# Patient Record
Sex: FEMALE | Race: WHITE | NOT HISPANIC OR LATINO | Employment: OTHER | URBAN - METROPOLITAN AREA
[De-identification: names, ages, dates, MRNs, and addresses within clinical notes are randomized per-mention and may not be internally consistent; named-entity substitution may affect disease eponyms.]

---

## 2017-02-27 ENCOUNTER — RECORDS - HEALTHEAST (OUTPATIENT)
Dept: LAB | Facility: CLINIC | Age: 77
End: 2017-02-27

## 2017-02-27 LAB
CHOLEST SERPL-MCNC: 237 MG/DL
FASTING STATUS PATIENT QL REPORTED: ABNORMAL
HDLC SERPL-MCNC: 101 MG/DL
LDLC SERPL CALC-MCNC: 120 MG/DL
TRIGL SERPL-MCNC: 80 MG/DL

## 2017-05-24 ENCOUNTER — RECORDS - HEALTHEAST (OUTPATIENT)
Dept: LAB | Facility: CLINIC | Age: 77
End: 2017-05-24

## 2017-05-24 LAB
CHOLEST SERPL-MCNC: 276 MG/DL
FASTING STATUS PATIENT QL REPORTED: ABNORMAL
HDLC SERPL-MCNC: 88 MG/DL
LDLC SERPL CALC-MCNC: 167 MG/DL
TRIGL SERPL-MCNC: 107 MG/DL

## 2018-02-12 ENCOUNTER — RECORDS - HEALTHEAST (OUTPATIENT)
Dept: LAB | Facility: CLINIC | Age: 78
End: 2018-02-12

## 2018-02-12 LAB
ALBUMIN SERPL-MCNC: 3.4 G/DL (ref 3.5–5)
ALP SERPL-CCNC: 104 U/L (ref 45–120)
ALT SERPL W P-5'-P-CCNC: 19 U/L (ref 0–45)
ANION GAP SERPL CALCULATED.3IONS-SCNC: 8 MMOL/L (ref 5–18)
AST SERPL W P-5'-P-CCNC: 24 U/L (ref 0–40)
BILIRUB SERPL-MCNC: 0.8 MG/DL (ref 0–1)
BUN SERPL-MCNC: 15 MG/DL (ref 8–28)
CALCIUM SERPL-MCNC: 9.2 MG/DL (ref 8.5–10.5)
CHLORIDE BLD-SCNC: 107 MMOL/L (ref 98–107)
CO2 SERPL-SCNC: 28 MMOL/L (ref 22–31)
CREAT SERPL-MCNC: 0.72 MG/DL (ref 0.6–1.1)
GFR SERPL CREATININE-BSD FRML MDRD: >60 ML/MIN/1.73M2
GLUCOSE BLD-MCNC: 92 MG/DL (ref 70–125)
POTASSIUM BLD-SCNC: 4.1 MMOL/L (ref 3.5–5)
PROT SERPL-MCNC: 6.7 G/DL (ref 6–8)
SODIUM SERPL-SCNC: 143 MMOL/L (ref 136–145)

## 2018-08-06 ENCOUNTER — RECORDS - HEALTHEAST (OUTPATIENT)
Dept: LAB | Facility: CLINIC | Age: 78
End: 2018-08-06

## 2018-08-06 LAB
ALBUMIN SERPL-MCNC: 3.5 G/DL (ref 3.5–5)
ALP SERPL-CCNC: 105 U/L (ref 45–120)
ALT SERPL W P-5'-P-CCNC: 18 U/L (ref 0–45)
ANION GAP SERPL CALCULATED.3IONS-SCNC: 9 MMOL/L (ref 5–18)
AST SERPL W P-5'-P-CCNC: 24 U/L (ref 0–40)
BILIRUB SERPL-MCNC: 0.4 MG/DL (ref 0–1)
BUN SERPL-MCNC: 13 MG/DL (ref 8–28)
CALCIUM SERPL-MCNC: 9.2 MG/DL (ref 8.5–10.5)
CHLORIDE BLD-SCNC: 107 MMOL/L (ref 98–107)
CO2 SERPL-SCNC: 27 MMOL/L (ref 22–31)
CREAT SERPL-MCNC: 0.76 MG/DL (ref 0.6–1.1)
ERYTHROCYTE [DISTWIDTH] IN BLOOD BY AUTOMATED COUNT: 13.2 % (ref 11–14.5)
GFR SERPL CREATININE-BSD FRML MDRD: >60 ML/MIN/1.73M2
GLUCOSE BLD-MCNC: 84 MG/DL (ref 70–125)
HCT VFR BLD AUTO: 40.7 % (ref 35–47)
HGB BLD-MCNC: 13 G/DL (ref 12–16)
MCH RBC QN AUTO: 30.2 PG (ref 27–34)
MCHC RBC AUTO-ENTMCNC: 31.9 G/DL (ref 32–36)
MCV RBC AUTO: 94 FL (ref 80–100)
PLATELET # BLD AUTO: 227 THOU/UL (ref 140–440)
PMV BLD AUTO: 10.4 FL (ref 8.5–12.5)
POTASSIUM BLD-SCNC: 4.4 MMOL/L (ref 3.5–5)
PROT SERPL-MCNC: 6.4 G/DL (ref 6–8)
RBC # BLD AUTO: 4.31 MILL/UL (ref 3.8–5.4)
SODIUM SERPL-SCNC: 143 MMOL/L (ref 136–145)
TSH SERPL DL<=0.005 MIU/L-ACNC: 2.81 UIU/ML (ref 0.3–5)
WBC: 4.8 THOU/UL (ref 4–11)

## 2019-02-04 ENCOUNTER — RECORDS - HEALTHEAST (OUTPATIENT)
Dept: LAB | Facility: CLINIC | Age: 79
End: 2019-02-04

## 2019-02-04 LAB
ANION GAP SERPL CALCULATED.3IONS-SCNC: 11 MMOL/L (ref 5–18)
BUN SERPL-MCNC: 17 MG/DL (ref 8–28)
CALCIUM SERPL-MCNC: 9.2 MG/DL (ref 8.5–10.5)
CHLORIDE BLD-SCNC: 101 MMOL/L (ref 98–107)
CHOLEST SERPL-MCNC: 214 MG/DL
CO2 SERPL-SCNC: 27 MMOL/L (ref 22–31)
CREAT SERPL-MCNC: 0.75 MG/DL (ref 0.6–1.1)
FASTING STATUS PATIENT QL REPORTED: ABNORMAL
GFR SERPL CREATININE-BSD FRML MDRD: >60 ML/MIN/1.73M2
GLUCOSE BLD-MCNC: 79 MG/DL (ref 70–125)
HDLC SERPL-MCNC: 92 MG/DL
LDLC SERPL CALC-MCNC: 105 MG/DL
POTASSIUM BLD-SCNC: 4.2 MMOL/L (ref 3.5–5)
SODIUM SERPL-SCNC: 139 MMOL/L (ref 136–145)
TRIGL SERPL-MCNC: 86 MG/DL

## 2019-09-04 ENCOUNTER — AMBULATORY - HEALTHEAST (OUTPATIENT)
Dept: SURGERY | Facility: CLINIC | Age: 79
End: 2019-09-04

## 2020-03-27 ENCOUNTER — RECORDS - HEALTHEAST (OUTPATIENT)
Dept: LAB | Facility: CLINIC | Age: 80
End: 2020-03-27

## 2020-03-29 LAB — BACTERIA SPEC CULT: ABNORMAL

## 2020-08-14 ENCOUNTER — RECORDS - HEALTHEAST (OUTPATIENT)
Dept: LAB | Facility: CLINIC | Age: 80
End: 2020-08-14

## 2020-08-14 LAB
ANION GAP SERPL CALCULATED.3IONS-SCNC: 11 MMOL/L (ref 5–18)
BUN SERPL-MCNC: 15 MG/DL (ref 8–28)
CALCIUM SERPL-MCNC: 9.2 MG/DL (ref 8.5–10.5)
CHLORIDE BLD-SCNC: 107 MMOL/L (ref 98–107)
CO2 SERPL-SCNC: 25 MMOL/L (ref 22–31)
CREAT SERPL-MCNC: 0.84 MG/DL (ref 0.6–1.1)
GFR SERPL CREATININE-BSD FRML MDRD: >60 ML/MIN/1.73M2
GLUCOSE BLD-MCNC: 100 MG/DL (ref 70–125)
POTASSIUM BLD-SCNC: 4.6 MMOL/L (ref 3.5–5)
SODIUM SERPL-SCNC: 143 MMOL/L (ref 136–145)

## 2020-08-17 ENCOUNTER — RECORDS - HEALTHEAST (OUTPATIENT)
Dept: ADMINISTRATIVE | Facility: OTHER | Age: 80
End: 2020-08-17

## 2020-08-26 RX ORDER — LOVASTATIN 20 MG
20 TABLET ORAL AT BEDTIME
Status: SHIPPED | COMMUNITY
Start: 2020-08-26

## 2020-08-26 RX ORDER — B-COMPLEX WITH VITAMIN C
TABLET ORAL
Status: SHIPPED | COMMUNITY
Start: 2020-08-26 | End: 2023-10-24

## 2020-08-26 RX ORDER — TRAMADOL HYDROCHLORIDE 50 MG/1
50 TABLET ORAL 2 TIMES DAILY
Status: SHIPPED | COMMUNITY
Start: 2020-08-26 | End: 2023-09-19

## 2020-08-26 RX ORDER — PRAMIPEXOLE DIHYDROCHLORIDE 0.12 MG/1
0.12 TABLET ORAL AT BEDTIME
Status: SHIPPED | COMMUNITY
Start: 2020-08-26 | End: 2023-09-19

## 2020-08-26 RX ORDER — LISINOPRIL 10 MG/1
10 TABLET ORAL DAILY
Status: SHIPPED | COMMUNITY
Start: 2020-08-26 | End: 2023-09-19

## 2020-08-26 RX ORDER — LEVOTHYROXINE SODIUM 50 UG/1
50 TABLET ORAL DAILY
Status: SHIPPED | COMMUNITY
Start: 2020-08-26

## 2020-08-27 ENCOUNTER — HOSPITAL ENCOUNTER (OUTPATIENT)
Dept: NUCLEAR MEDICINE | Facility: HOSPITAL | Age: 80
Discharge: HOME | End: 2020-08-27
Attending: FAMILY MEDICINE

## 2020-08-27 ENCOUNTER — HOSPITAL ENCOUNTER (OUTPATIENT)
Dept: CARDIOLOGY | Facility: HOSPITAL | Age: 80
Discharge: HOME | End: 2020-08-27
Attending: FAMILY MEDICINE

## 2020-08-27 DIAGNOSIS — R06.09 DOE (DYSPNEA ON EXERTION): ICD-10-CM

## 2020-08-27 LAB
CV STRESS CURRENT BP HE: NORMAL
CV STRESS CURRENT HR HE: 101
CV STRESS CURRENT HR HE: 103
CV STRESS CURRENT HR HE: 103
CV STRESS CURRENT HR HE: 104
CV STRESS CURRENT HR HE: 76
CV STRESS CURRENT HR HE: 78
CV STRESS CURRENT HR HE: 88
CV STRESS CURRENT HR HE: 89
CV STRESS CURRENT HR HE: 90
CV STRESS CURRENT HR HE: 90
CV STRESS CURRENT HR HE: 92
CV STRESS CURRENT HR HE: 93
CV STRESS CURRENT HR HE: 96
CV STRESS CURRENT HR HE: 99
CV STRESS CURRENT HR HE: 99
CV STRESS DEVIATION TIME HE: NORMAL
CV STRESS ECHO PERCENT HR HE: NORMAL
CV STRESS EXERCISE STAGE HE: NORMAL
CV STRESS FINAL RESTING BP HE: NORMAL
CV STRESS FINAL RESTING HR HE: 89
CV STRESS MAX HR HE: 105
CV STRESS MAX TREADMILL GRADE HE: 0
CV STRESS MAX TREADMILL SPEED HE: 0
CV STRESS PEAK DIA BP HE: NORMAL
CV STRESS PEAK SYS BP HE: NORMAL
CV STRESS PHASE HE: NORMAL
CV STRESS PROTOCOL HE: NORMAL
CV STRESS RESTING PT POSITION HE: NORMAL
CV STRESS ST DEVIATION AMOUNT HE: NORMAL
CV STRESS ST DEVIATION ELEVATION HE: NORMAL
CV STRESS ST EVELATION AMOUNT HE: NORMAL
CV STRESS TEST TYPE HE: NORMAL
CV STRESS TOTAL STAGE TIME MIN 1 HE: NORMAL
NUC REST EJECTION FRACTION: 76 %
RATE PRESSURE PRODUCT: NORMAL
STRESS ECHO BASELINE DIASTOLIC HE: 112
STRESS ECHO BASELINE HR: 80
STRESS ECHO BASELINE SYSTOLIC BP: 217
STRESS ECHO CALCULATED PERCENT HR: 76 %
STRESS ECHO LAST STRESS DIASTOLIC BP: 110
STRESS ECHO LAST STRESS HR: 99
STRESS ECHO LAST STRESS SYSTOLIC BP: 246
STRESS ECHO TARGET HR: 139

## 2020-12-15 ENCOUNTER — RECORDS - HEALTHEAST (OUTPATIENT)
Dept: LAB | Facility: CLINIC | Age: 80
End: 2020-12-15

## 2020-12-15 LAB
ANION GAP SERPL CALCULATED.3IONS-SCNC: 7 MMOL/L (ref 5–18)
BUN SERPL-MCNC: 19 MG/DL (ref 8–28)
CALCIUM SERPL-MCNC: 8.7 MG/DL (ref 8.5–10.5)
CHLORIDE BLD-SCNC: 107 MMOL/L (ref 98–107)
CHOLEST SERPL-MCNC: 235 MG/DL
CO2 SERPL-SCNC: 31 MMOL/L (ref 22–31)
CREAT SERPL-MCNC: 0.73 MG/DL (ref 0.6–1.1)
FASTING STATUS PATIENT QL REPORTED: ABNORMAL
GFR SERPL CREATININE-BSD FRML MDRD: >60 ML/MIN/1.73M2
GLUCOSE BLD-MCNC: 97 MG/DL (ref 70–125)
HDLC SERPL-MCNC: 100 MG/DL
LDLC SERPL CALC-MCNC: 113 MG/DL
POTASSIUM BLD-SCNC: 4.6 MMOL/L (ref 3.5–5)
SODIUM SERPL-SCNC: 145 MMOL/L (ref 136–145)
TRIGL SERPL-MCNC: 108 MG/DL
TSH SERPL DL<=0.005 MIU/L-ACNC: 4.51 UIU/ML (ref 0.3–5)

## 2021-04-27 ENCOUNTER — RECORDS - HEALTHEAST (OUTPATIENT)
Dept: LAB | Facility: CLINIC | Age: 81
End: 2021-04-27

## 2021-04-27 LAB
ANION GAP SERPL CALCULATED.3IONS-SCNC: 8 MMOL/L (ref 5–18)
BNP SERPL-MCNC: 121 PG/ML (ref 0–163)
BUN SERPL-MCNC: 18 MG/DL (ref 8–28)
CALCIUM SERPL-MCNC: 8.6 MG/DL (ref 8.5–10.5)
CHLORIDE BLD-SCNC: 108 MMOL/L (ref 98–107)
CO2 SERPL-SCNC: 29 MMOL/L (ref 22–31)
CREAT SERPL-MCNC: 0.83 MG/DL (ref 0.6–1.1)
GFR SERPL CREATININE-BSD FRML MDRD: >60 ML/MIN/1.73M2
GLUCOSE BLD-MCNC: 92 MG/DL (ref 70–125)
POTASSIUM BLD-SCNC: 4.4 MMOL/L (ref 3.5–5)
SODIUM SERPL-SCNC: 145 MMOL/L (ref 136–145)

## 2021-05-25 ENCOUNTER — RECORDS - HEALTHEAST (OUTPATIENT)
Dept: LAB | Facility: CLINIC | Age: 81
End: 2021-05-25

## 2021-05-25 ENCOUNTER — RECORDS - HEALTHEAST (OUTPATIENT)
Dept: ADMINISTRATIVE | Facility: CLINIC | Age: 81
End: 2021-05-25

## 2021-05-25 LAB
ANION GAP SERPL CALCULATED.3IONS-SCNC: 10 MMOL/L (ref 5–18)
BUN SERPL-MCNC: 23 MG/DL (ref 8–28)
CALCIUM SERPL-MCNC: 8.5 MG/DL (ref 8.5–10.5)
CHLORIDE BLD-SCNC: 108 MMOL/L (ref 98–107)
CO2 SERPL-SCNC: 27 MMOL/L (ref 22–31)
CREAT SERPL-MCNC: 0.94 MG/DL (ref 0.6–1.1)
GFR SERPL CREATININE-BSD FRML MDRD: 57 ML/MIN/1.73M2
GLUCOSE BLD-MCNC: 106 MG/DL (ref 70–125)
POTASSIUM BLD-SCNC: 4.7 MMOL/L (ref 3.5–5)
SODIUM SERPL-SCNC: 145 MMOL/L (ref 136–145)

## 2021-05-29 ENCOUNTER — RECORDS - HEALTHEAST (OUTPATIENT)
Dept: ADMINISTRATIVE | Facility: CLINIC | Age: 81
End: 2021-05-29

## 2021-05-30 ENCOUNTER — RECORDS - HEALTHEAST (OUTPATIENT)
Dept: ADMINISTRATIVE | Facility: CLINIC | Age: 81
End: 2021-05-30

## 2021-05-31 ENCOUNTER — RECORDS - HEALTHEAST (OUTPATIENT)
Dept: ADMINISTRATIVE | Facility: CLINIC | Age: 81
End: 2021-05-31

## 2021-06-10 NOTE — PROGRESS NOTES
Nuclear Pharmacological Stress Test:  Patient arrived for nuclear stress test with /112. Patient having stress test for DESAI; denies CP, headache, or other symptoms. Patient states has never had htn nor does she have any rx for antihypertensives. Lexiscan stress test was completed. RN consulted cardiologist Dr. Santacruz re: patient's htn and bringing patient to ED was recommended. Patient was agreeable to this plan. BP recheck after final nuclear images was 226/102 so ED was notified and patient was brought to ED room 10 to be further examined.  Disha Velasquez, RN  [facility]

## 2021-07-07 ENCOUNTER — RECORDS - HEALTHEAST (OUTPATIENT)
Dept: RESPIRATORY THERAPY | Facility: CLINIC | Age: 81
End: 2021-07-07

## 2021-07-07 ENCOUNTER — RECORDS - HEALTHEAST (OUTPATIENT)
Dept: ADMINISTRATIVE | Facility: OTHER | Age: 81
End: 2021-07-07

## 2021-07-07 DIAGNOSIS — R06.00 DYSPNEA, UNSPECIFIED TYPE: ICD-10-CM

## 2021-08-04 ENCOUNTER — HOSPITAL ENCOUNTER (OUTPATIENT)
Dept: RESPIRATORY THERAPY | Facility: CLINIC | Age: 81
Discharge: HOME | End: 2021-08-04
Attending: INTERNAL MEDICINE | Admitting: INTERNAL MEDICINE
Payer: COMMERCIAL

## 2021-08-04 DIAGNOSIS — R06.00 DYSPNEA, UNSPECIFIED: ICD-10-CM

## 2021-08-04 LAB — HGB BLD-MCNC: 11.8 G/DL (ref 11.7–15.7)

## 2021-08-04 PROCEDURE — 94060 EVALUATION OF WHEEZING: CPT

## 2021-08-04 PROCEDURE — 94729 DIFFUSING CAPACITY: CPT | Mod: 26 | Performed by: INTERNAL MEDICINE

## 2021-08-04 PROCEDURE — 36415 COLL VENOUS BLD VENIPUNCTURE: CPT

## 2021-08-04 PROCEDURE — 94729 DIFFUSING CAPACITY: CPT

## 2021-08-04 PROCEDURE — 94726 PLETHYSMOGRAPHY LUNG VOLUMES: CPT | Mod: 26 | Performed by: INTERNAL MEDICINE

## 2021-08-04 PROCEDURE — 94726 PLETHYSMOGRAPHY LUNG VOLUMES: CPT

## 2021-08-04 PROCEDURE — 94060 EVALUATION OF WHEEZING: CPT | Mod: 26 | Performed by: INTERNAL MEDICINE

## 2021-08-04 PROCEDURE — 999N000157 HC STATISTIC RCP TIME EA 10 MIN

## 2021-08-04 PROCEDURE — 85018 HEMOGLOBIN: CPT

## 2021-08-04 PROCEDURE — 94640 AIRWAY INHALATION TREATMENT: CPT

## 2021-08-04 RX ORDER — ALBUTEROL SULFATE 0.83 MG/ML
2.5 SOLUTION RESPIRATORY (INHALATION) ONCE
Status: COMPLETED | OUTPATIENT
Start: 2021-08-04 | End: 2021-08-04

## 2021-08-04 RX ADMIN — ALBUTEROL SULFATE 2.5 MG: 0.83 SOLUTION RESPIRATORY (INHALATION) at 14:27

## 2021-08-04 NOTE — PROGRESS NOTES
RESPIRATORY CARE NOTE     Patient Name: Makayla Dow  Today's Date: 8/4/2021     Complete PFT done. Pt performed tests with good effort. Albuterol neb given.Test results meet ATS criteria. Results scanned into epic. Pt left in no distress.       Leatha Tam, RT

## 2021-09-04 LAB
DLCOCOR-%PRED-PRE: 83 %
DLCOCOR-PRE: 14.83 ML/MIN/MMHG
DLCOUNC-%PRED-PRE: 79 %
DLCOUNC-PRE: 14.05 ML/MIN/MMHG
DLCOUNC-PRED: 17.73 ML/MIN/MMHG
ERV-%PRED-PRE: -70 %
ERV-PRE: 0.08 L
ERV-PRED: -0.12 L
EXPTIME-PRE: 5.73 SEC
FEF2575-%PRED-POST: 78 %
FEF2575-%PRED-PRE: 60 %
FEF2575-POST: 1.08 L/SEC
FEF2575-PRE: 0.84 L/SEC
FEF2575-PRED: 1.38 L/SEC
FEFMAX-%PRED-PRE: 66 %
FEFMAX-PRE: 2.92 L/SEC
FEFMAX-PRED: 4.37 L/SEC
FEV1-%PRED-PRE: 61 %
FEV1-PRE: 1.02 L
FEV1FEV6-PRE: 77 %
FEV1FEV6-PRED: 77 %
FEV1FVC-PRE: 77 %
FEV1FVC-PRED: 78 %
FEV1SVC-PRE: 81 %
FEV1SVC-PRED: 64 %
FIFMAX-PRE: 2.01 L/SEC
FRCPLETH-%PRED-PRE: 88 %
FRCPLETH-PRE: 2.34 L
FRCPLETH-PRED: 2.64 L
FVC-%PRED-PRE: 60 %
FVC-PRE: 1.32 L
FVC-PRED: 2.17 L
IC-%PRED-PRE: 43 %
IC-PRE: 1.19 L
IC-PRED: 2.74 L
RVPLETH-%PRED-PRE: 103 %
RVPLETH-PRE: 2.25 L
RVPLETH-PRED: 2.19 L
TLCPLETH-%PRED-PRE: 75 %
TLCPLETH-PRE: 3.52 L
TLCPLETH-PRED: 4.69 L
VA-%PRED-PRE: 71 %
VA-PRE: 3.07 L
VC-%PRED-PRE: 48 %
VC-PRE: 1.27 L
VC-PRED: 2.62 L

## 2021-09-23 ENCOUNTER — LAB REQUISITION (OUTPATIENT)
Dept: LAB | Facility: CLINIC | Age: 81
End: 2021-09-23

## 2021-09-23 DIAGNOSIS — R60.0 LOCALIZED EDEMA: ICD-10-CM

## 2021-09-23 LAB
ANION GAP SERPL CALCULATED.3IONS-SCNC: 10 MMOL/L (ref 5–18)
BUN SERPL-MCNC: 16 MG/DL (ref 8–28)
CALCIUM SERPL-MCNC: 8.9 MG/DL (ref 8.5–10.5)
CHLORIDE BLD-SCNC: 108 MMOL/L (ref 98–107)
CO2 SERPL-SCNC: 24 MMOL/L (ref 22–31)
CREAT SERPL-MCNC: 0.81 MG/DL (ref 0.6–1.1)
GFR SERPL CREATININE-BSD FRML MDRD: 68 ML/MIN/1.73M2
GLUCOSE BLD-MCNC: 86 MG/DL (ref 70–125)
POTASSIUM BLD-SCNC: 4 MMOL/L (ref 3.5–5)
SODIUM SERPL-SCNC: 142 MMOL/L (ref 136–145)

## 2021-09-23 PROCEDURE — 80048 BASIC METABOLIC PNL TOTAL CA: CPT | Performed by: FAMILY MEDICINE

## 2021-09-23 PROCEDURE — 36415 COLL VENOUS BLD VENIPUNCTURE: CPT | Performed by: FAMILY MEDICINE

## 2022-01-13 ENCOUNTER — LAB REQUISITION (OUTPATIENT)
Dept: LAB | Facility: CLINIC | Age: 82
End: 2022-01-13
Payer: COMMERCIAL

## 2022-01-13 DIAGNOSIS — Z03.818 ENCOUNTER FOR OBSERVATION FOR SUSPECTED EXPOSURE TO OTHER BIOLOGICAL AGENTS RULED OUT: ICD-10-CM

## 2022-01-13 PROCEDURE — U0005 INFEC AGEN DETEC AMPLI PROBE: HCPCS | Mod: ORL | Performed by: FAMILY MEDICINE

## 2022-01-14 LAB — SARS-COV-2 RNA RESP QL NAA+PROBE: POSITIVE

## 2022-02-10 ENCOUNTER — LAB REQUISITION (OUTPATIENT)
Dept: LAB | Facility: CLINIC | Age: 82
End: 2022-02-10

## 2022-02-10 DIAGNOSIS — E03.9 HYPOTHYROIDISM, UNSPECIFIED: ICD-10-CM

## 2022-02-10 DIAGNOSIS — E78.5 HYPERLIPIDEMIA, UNSPECIFIED: ICD-10-CM

## 2022-02-10 DIAGNOSIS — R60.0 LOCALIZED EDEMA: ICD-10-CM

## 2022-02-10 LAB
ANION GAP SERPL CALCULATED.3IONS-SCNC: 13 MMOL/L (ref 5–18)
BNP SERPL-MCNC: 117 PG/ML (ref 0–167)
BUN SERPL-MCNC: 13 MG/DL (ref 8–28)
CALCIUM SERPL-MCNC: 8.8 MG/DL (ref 8.5–10.5)
CHLORIDE BLD-SCNC: 106 MMOL/L (ref 98–107)
CHOLEST SERPL-MCNC: 215 MG/DL
CO2 SERPL-SCNC: 24 MMOL/L (ref 22–31)
CREAT SERPL-MCNC: 0.73 MG/DL (ref 0.6–1.1)
FASTING STATUS PATIENT QL REPORTED: ABNORMAL
GFR SERPL CREATININE-BSD FRML MDRD: 81 ML/MIN/1.73M2
GLUCOSE BLD-MCNC: 104 MG/DL (ref 70–125)
HDLC SERPL-MCNC: 80 MG/DL
LDLC SERPL CALC-MCNC: 119 MG/DL
POTASSIUM BLD-SCNC: 4.4 MMOL/L (ref 3.5–5)
SODIUM SERPL-SCNC: 143 MMOL/L (ref 136–145)
TRIGL SERPL-MCNC: 78 MG/DL
TSH SERPL DL<=0.005 MIU/L-ACNC: 7.88 UIU/ML (ref 0.3–5)

## 2022-02-10 PROCEDURE — 83880 ASSAY OF NATRIURETIC PEPTIDE: CPT | Performed by: FAMILY MEDICINE

## 2022-02-10 PROCEDURE — 80048 BASIC METABOLIC PNL TOTAL CA: CPT | Performed by: FAMILY MEDICINE

## 2022-02-10 PROCEDURE — 84443 ASSAY THYROID STIM HORMONE: CPT | Performed by: FAMILY MEDICINE

## 2022-02-10 PROCEDURE — 80061 LIPID PANEL: CPT | Performed by: FAMILY MEDICINE

## 2022-03-18 ENCOUNTER — LAB REQUISITION (OUTPATIENT)
Dept: LAB | Facility: CLINIC | Age: 82
End: 2022-03-18
Payer: COMMERCIAL

## 2022-03-18 DIAGNOSIS — Z03.818 ENCOUNTER FOR OBSERVATION FOR SUSPECTED EXPOSURE TO OTHER BIOLOGICAL AGENTS RULED OUT: ICD-10-CM

## 2022-03-18 PROCEDURE — U0005 INFEC AGEN DETEC AMPLI PROBE: HCPCS | Mod: ORL | Performed by: FAMILY MEDICINE

## 2022-03-19 LAB — SARS-COV-2 RNA RESP QL NAA+PROBE: NEGATIVE

## 2022-11-08 ENCOUNTER — TRANSFERRED RECORDS (OUTPATIENT)
Dept: HEALTH INFORMATION MANAGEMENT | Facility: CLINIC | Age: 82
End: 2022-11-08

## 2023-04-07 ENCOUNTER — LAB REQUISITION (OUTPATIENT)
Dept: LAB | Facility: CLINIC | Age: 83
End: 2023-04-07

## 2023-04-07 ENCOUNTER — TRANSFERRED RECORDS (OUTPATIENT)
Dept: HEALTH INFORMATION MANAGEMENT | Facility: CLINIC | Age: 83
End: 2023-04-07

## 2023-04-07 DIAGNOSIS — E78.5 HYPERLIPIDEMIA, UNSPECIFIED: ICD-10-CM

## 2023-04-07 DIAGNOSIS — E03.9 HYPOTHYROIDISM, UNSPECIFIED: ICD-10-CM

## 2023-04-07 DIAGNOSIS — I10 ESSENTIAL (PRIMARY) HYPERTENSION: ICD-10-CM

## 2023-04-07 LAB
ANION GAP SERPL CALCULATED.3IONS-SCNC: 11 MMOL/L (ref 7–15)
BUN SERPL-MCNC: 22.8 MG/DL (ref 8–23)
CALCIUM SERPL-MCNC: 9 MG/DL (ref 8.8–10.2)
CHLORIDE SERPL-SCNC: 106 MMOL/L (ref 98–107)
CHOLEST SERPL-MCNC: 212 MG/DL
CREAT SERPL-MCNC: 0.82 MG/DL (ref 0.51–0.95)
DEPRECATED HCO3 PLAS-SCNC: 26 MMOL/L (ref 22–29)
GFR SERPL CREATININE-BSD FRML MDRD: 70 ML/MIN/1.73M2
GLUCOSE SERPL-MCNC: 95 MG/DL (ref 70–99)
HDLC SERPL-MCNC: 94 MG/DL
LDLC SERPL CALC-MCNC: 99 MG/DL
NONHDLC SERPL-MCNC: 118 MG/DL
POTASSIUM SERPL-SCNC: 4.5 MMOL/L (ref 3.4–5.3)
SODIUM SERPL-SCNC: 143 MMOL/L (ref 136–145)
TRIGL SERPL-MCNC: 97 MG/DL
TSH SERPL DL<=0.005 MIU/L-ACNC: 20.5 UIU/ML (ref 0.3–4.2)

## 2023-04-07 PROCEDURE — 84443 ASSAY THYROID STIM HORMONE: CPT | Performed by: FAMILY MEDICINE

## 2023-04-07 PROCEDURE — 80048 BASIC METABOLIC PNL TOTAL CA: CPT | Performed by: FAMILY MEDICINE

## 2023-04-07 PROCEDURE — 80061 LIPID PANEL: CPT | Performed by: FAMILY MEDICINE

## 2023-09-19 ENCOUNTER — APPOINTMENT (OUTPATIENT)
Dept: CT IMAGING | Facility: HOSPITAL | Age: 83
DRG: 871 | End: 2023-09-19
Attending: EMERGENCY MEDICINE
Payer: COMMERCIAL

## 2023-09-19 ENCOUNTER — HOSPITAL ENCOUNTER (INPATIENT)
Facility: HOSPITAL | Age: 83
LOS: 10 days | Discharge: NURSING, REHAB OR LONG-TERM CARE | DRG: 871 | End: 2023-09-29
Attending: EMERGENCY MEDICINE | Admitting: STUDENT IN AN ORGANIZED HEALTH CARE EDUCATION/TRAINING PROGRAM
Payer: COMMERCIAL

## 2023-09-19 DIAGNOSIS — R09.02 HYPOXIA: ICD-10-CM

## 2023-09-19 DIAGNOSIS — R78.81 MSSA BACTEREMIA: Primary | ICD-10-CM

## 2023-09-19 DIAGNOSIS — M46.42 CERVICAL DISCITIS: ICD-10-CM

## 2023-09-19 DIAGNOSIS — R50.9 FEVER, UNSPECIFIED FEVER CAUSE: ICD-10-CM

## 2023-09-19 DIAGNOSIS — B95.61 MSSA BACTEREMIA: Primary | ICD-10-CM

## 2023-09-19 DIAGNOSIS — I50.9 CONGESTIVE HEART FAILURE, UNSPECIFIED HF CHRONICITY, UNSPECIFIED HEART FAILURE TYPE (H): ICD-10-CM

## 2023-09-19 DIAGNOSIS — R07.9 CHEST PAIN, UNSPECIFIED TYPE: ICD-10-CM

## 2023-09-19 DIAGNOSIS — E78.5 HYPERLIPIDEMIA LDL GOAL <100: ICD-10-CM

## 2023-09-19 LAB
ANION GAP SERPL CALCULATED.3IONS-SCNC: 10 MMOL/L (ref 7–15)
BASOPHILS # BLD AUTO: 0 10E3/UL (ref 0–0.2)
BASOPHILS NFR BLD AUTO: 0 %
BUN SERPL-MCNC: 15.5 MG/DL (ref 8–23)
CALCIUM SERPL-MCNC: 8.7 MG/DL (ref 8.8–10.2)
CHLORIDE SERPL-SCNC: 104 MMOL/L (ref 98–107)
CREAT SERPL-MCNC: 0.7 MG/DL (ref 0.51–0.95)
CRP SERPL-MCNC: 35.7 MG/L
DEPRECATED HCO3 PLAS-SCNC: 26 MMOL/L (ref 22–29)
EGFRCR SERPLBLD CKD-EPI 2021: 85 ML/MIN/1.73M2
EOSINOPHIL # BLD AUTO: 0.1 10E3/UL (ref 0–0.7)
EOSINOPHIL NFR BLD AUTO: 0 %
ERYTHROCYTE [DISTWIDTH] IN BLOOD BY AUTOMATED COUNT: 12.8 % (ref 10–15)
FLUAV RNA SPEC QL NAA+PROBE: NEGATIVE
FLUBV RNA RESP QL NAA+PROBE: NEGATIVE
GLUCOSE SERPL-MCNC: 115 MG/DL (ref 70–99)
HCT VFR BLD AUTO: 35.6 % (ref 35–47)
HGB BLD-MCNC: 11.3 G/DL (ref 11.7–15.7)
IMM GRANULOCYTES # BLD: 0.1 10E3/UL
IMM GRANULOCYTES NFR BLD: 1 %
LYMPHOCYTES # BLD AUTO: 0.6 10E3/UL (ref 0.8–5.3)
LYMPHOCYTES NFR BLD AUTO: 4 %
MCH RBC QN AUTO: 29.5 PG (ref 26.5–33)
MCHC RBC AUTO-ENTMCNC: 31.7 G/DL (ref 31.5–36.5)
MCV RBC AUTO: 93 FL (ref 78–100)
MONOCYTES # BLD AUTO: 1.1 10E3/UL (ref 0–1.3)
MONOCYTES NFR BLD AUTO: 8 %
NEUTROPHILS # BLD AUTO: 12.3 10E3/UL (ref 1.6–8.3)
NEUTROPHILS NFR BLD AUTO: 87 %
NRBC # BLD AUTO: 0 10E3/UL
NRBC BLD AUTO-RTO: 0 /100
NT-PROBNP SERPL-MCNC: 975 PG/ML (ref 0–1800)
PLATELET # BLD AUTO: 193 10E3/UL (ref 150–450)
POTASSIUM SERPL-SCNC: 3.8 MMOL/L (ref 3.4–5.3)
PROCALCITONIN SERPL IA-MCNC: 0.88 NG/ML
RBC # BLD AUTO: 3.83 10E6/UL (ref 3.8–5.2)
RSV RNA SPEC NAA+PROBE: NEGATIVE
SARS-COV-2 RNA RESP QL NAA+PROBE: NEGATIVE
SODIUM SERPL-SCNC: 140 MMOL/L (ref 136–145)
TROPONIN T SERPL HS-MCNC: 29 NG/L
TROPONIN T SERPL HS-MCNC: 36 NG/L
TROPONIN T SERPL HS-MCNC: 37 NG/L
WBC # BLD AUTO: 14.2 10E3/UL (ref 4–11)

## 2023-09-19 PROCEDURE — 250N000011 HC RX IP 250 OP 636: Mod: JZ | Performed by: EMERGENCY MEDICINE

## 2023-09-19 PROCEDURE — 84145 PROCALCITONIN (PCT): CPT | Performed by: EMERGENCY MEDICINE

## 2023-09-19 PROCEDURE — 80048 BASIC METABOLIC PNL TOTAL CA: CPT | Performed by: EMERGENCY MEDICINE

## 2023-09-19 PROCEDURE — 87149 DNA/RNA DIRECT PROBE: CPT | Performed by: EMERGENCY MEDICINE

## 2023-09-19 PROCEDURE — 96374 THER/PROPH/DIAG INJ IV PUSH: CPT | Mod: 59

## 2023-09-19 PROCEDURE — 93005 ELECTROCARDIOGRAM TRACING: CPT | Performed by: STUDENT IN AN ORGANIZED HEALTH CARE EDUCATION/TRAINING PROGRAM

## 2023-09-19 PROCEDURE — 85025 COMPLETE CBC W/AUTO DIFF WBC: CPT | Performed by: EMERGENCY MEDICINE

## 2023-09-19 PROCEDURE — 94640 AIRWAY INHALATION TREATMENT: CPT

## 2023-09-19 PROCEDURE — 87077 CULTURE AEROBIC IDENTIFY: CPT | Performed by: EMERGENCY MEDICINE

## 2023-09-19 PROCEDURE — 36415 COLL VENOUS BLD VENIPUNCTURE: CPT | Performed by: STUDENT IN AN ORGANIZED HEALTH CARE EDUCATION/TRAINING PROGRAM

## 2023-09-19 PROCEDURE — 84484 ASSAY OF TROPONIN QUANT: CPT | Performed by: STUDENT IN AN ORGANIZED HEALTH CARE EDUCATION/TRAINING PROGRAM

## 2023-09-19 PROCEDURE — 86140 C-REACTIVE PROTEIN: CPT | Performed by: EMERGENCY MEDICINE

## 2023-09-19 PROCEDURE — 87637 SARSCOV2&INF A&B&RSV AMP PRB: CPT | Performed by: EMERGENCY MEDICINE

## 2023-09-19 PROCEDURE — 99223 1ST HOSP IP/OBS HIGH 75: CPT | Performed by: STUDENT IN AN ORGANIZED HEALTH CARE EDUCATION/TRAINING PROGRAM

## 2023-09-19 PROCEDURE — 120N000001 HC R&B MED SURG/OB

## 2023-09-19 PROCEDURE — 999N000157 HC STATISTIC RCP TIME EA 10 MIN

## 2023-09-19 PROCEDURE — 36415 COLL VENOUS BLD VENIPUNCTURE: CPT | Performed by: EMERGENCY MEDICINE

## 2023-09-19 PROCEDURE — 83880 ASSAY OF NATRIURETIC PEPTIDE: CPT | Performed by: EMERGENCY MEDICINE

## 2023-09-19 PROCEDURE — 99285 EMERGENCY DEPT VISIT HI MDM: CPT | Mod: 25

## 2023-09-19 PROCEDURE — 250N000011 HC RX IP 250 OP 636: Mod: JZ | Performed by: STUDENT IN AN ORGANIZED HEALTH CARE EDUCATION/TRAINING PROGRAM

## 2023-09-19 PROCEDURE — 84484 ASSAY OF TROPONIN QUANT: CPT | Performed by: EMERGENCY MEDICINE

## 2023-09-19 PROCEDURE — 250N000009 HC RX 250: Performed by: EMERGENCY MEDICINE

## 2023-09-19 PROCEDURE — 250N000013 HC RX MED GY IP 250 OP 250 PS 637: Performed by: EMERGENCY MEDICINE

## 2023-09-19 PROCEDURE — 71275 CT ANGIOGRAPHY CHEST: CPT

## 2023-09-19 PROCEDURE — 258N000003 HC RX IP 258 OP 636: Performed by: EMERGENCY MEDICINE

## 2023-09-19 RX ORDER — LEVOTHYROXINE SODIUM 25 UG/1
50 TABLET ORAL DAILY
Status: DISCONTINUED | OUTPATIENT
Start: 2023-09-20 | End: 2023-09-29 | Stop reason: HOSPADM

## 2023-09-19 RX ORDER — POLYETHYLENE GLYCOL 3350 17 G/17G
17 POWDER, FOR SOLUTION ORAL DAILY PRN
Status: DISCONTINUED | OUTPATIENT
Start: 2023-09-19 | End: 2023-09-29 | Stop reason: HOSPADM

## 2023-09-19 RX ORDER — OXYBUTYNIN CHLORIDE 10 MG/1
10 TABLET, EXTENDED RELEASE ORAL DAILY
Status: DISCONTINUED | OUTPATIENT
Start: 2023-09-20 | End: 2023-09-29 | Stop reason: HOSPADM

## 2023-09-19 RX ORDER — PRAVASTATIN SODIUM 20 MG
20 TABLET ORAL DAILY
Status: DISCONTINUED | OUTPATIENT
Start: 2023-09-20 | End: 2023-09-29 | Stop reason: HOSPADM

## 2023-09-19 RX ORDER — IBUPROFEN 200 MG
200 TABLET ORAL EVERY 4 HOURS PRN
Status: ON HOLD | COMMUNITY
End: 2023-09-29

## 2023-09-19 RX ORDER — ACETAMINOPHEN 325 MG/1
650 TABLET ORAL EVERY 6 HOURS PRN
Status: DISCONTINUED | OUTPATIENT
Start: 2023-09-19 | End: 2023-09-26

## 2023-09-19 RX ORDER — PROCHLORPERAZINE 25 MG
12.5 SUPPOSITORY, RECTAL RECTAL EVERY 12 HOURS PRN
Status: DISCONTINUED | OUTPATIENT
Start: 2023-09-19 | End: 2023-09-29 | Stop reason: HOSPADM

## 2023-09-19 RX ORDER — FUROSEMIDE 10 MG/ML
40 INJECTION INTRAMUSCULAR; INTRAVENOUS
Status: DISCONTINUED | OUTPATIENT
Start: 2023-09-19 | End: 2023-09-21

## 2023-09-19 RX ORDER — IPRATROPIUM BROMIDE AND ALBUTEROL SULFATE 2.5; .5 MG/3ML; MG/3ML
3 SOLUTION RESPIRATORY (INHALATION) ONCE
Status: COMPLETED | OUTPATIENT
Start: 2023-09-19 | End: 2023-09-19

## 2023-09-19 RX ORDER — CEFTRIAXONE 1 G/1
1 INJECTION, POWDER, FOR SOLUTION INTRAMUSCULAR; INTRAVENOUS EVERY 24 HOURS
Status: DISCONTINUED | OUTPATIENT
Start: 2023-09-20 | End: 2023-09-20

## 2023-09-19 RX ORDER — ACETAMINOPHEN 650 MG/1
650 SUPPOSITORY RECTAL EVERY 6 HOURS PRN
Status: DISCONTINUED | OUTPATIENT
Start: 2023-09-19 | End: 2023-09-26

## 2023-09-19 RX ORDER — AZITHROMYCIN 250 MG/1
500 TABLET, FILM COATED ORAL DAILY
Status: DISCONTINUED | OUTPATIENT
Start: 2023-09-20 | End: 2023-09-20

## 2023-09-19 RX ORDER — OXYBUTYNIN CHLORIDE 10 MG/1
10 TABLET, EXTENDED RELEASE ORAL DAILY
COMMUNITY

## 2023-09-19 RX ORDER — CEFTRIAXONE 1 G/1
1 INJECTION, POWDER, FOR SOLUTION INTRAMUSCULAR; INTRAVENOUS ONCE
Status: COMPLETED | OUTPATIENT
Start: 2023-09-19 | End: 2023-09-19

## 2023-09-19 RX ORDER — ONDANSETRON 4 MG/1
4 TABLET, ORALLY DISINTEGRATING ORAL EVERY 6 HOURS PRN
Status: DISCONTINUED | OUTPATIENT
Start: 2023-09-19 | End: 2023-09-29 | Stop reason: HOSPADM

## 2023-09-19 RX ORDER — ONDANSETRON 2 MG/ML
4 INJECTION INTRAMUSCULAR; INTRAVENOUS EVERY 6 HOURS PRN
Status: DISCONTINUED | OUTPATIENT
Start: 2023-09-19 | End: 2023-09-29 | Stop reason: HOSPADM

## 2023-09-19 RX ORDER — LIDOCAINE 40 MG/G
CREAM TOPICAL
Status: DISCONTINUED | OUTPATIENT
Start: 2023-09-19 | End: 2023-09-29 | Stop reason: HOSPADM

## 2023-09-19 RX ORDER — IOPAMIDOL 755 MG/ML
90 INJECTION, SOLUTION INTRAVASCULAR ONCE
Status: COMPLETED | OUTPATIENT
Start: 2023-09-19 | End: 2023-09-19

## 2023-09-19 RX ORDER — PROCHLORPERAZINE MALEATE 5 MG
5 TABLET ORAL EVERY 6 HOURS PRN
Status: DISCONTINUED | OUTPATIENT
Start: 2023-09-19 | End: 2023-09-29 | Stop reason: HOSPADM

## 2023-09-19 RX ORDER — ACETAMINOPHEN 325 MG/1
650 TABLET ORAL ONCE
Status: COMPLETED | OUTPATIENT
Start: 2023-09-19 | End: 2023-09-19

## 2023-09-19 RX ORDER — ENOXAPARIN SODIUM 100 MG/ML
40 INJECTION SUBCUTANEOUS EVERY 12 HOURS
Status: DISCONTINUED | OUTPATIENT
Start: 2023-09-20 | End: 2023-09-29 | Stop reason: HOSPADM

## 2023-09-19 RX ORDER — ASPIRIN 81 MG/1
81 TABLET ORAL DAILY
Status: DISCONTINUED | OUTPATIENT
Start: 2023-09-20 | End: 2023-09-29 | Stop reason: HOSPADM

## 2023-09-19 RX ADMIN — AZITHROMYCIN MONOHYDRATE 500 MG: 500 INJECTION, POWDER, LYOPHILIZED, FOR SOLUTION INTRAVENOUS at 18:19

## 2023-09-19 RX ADMIN — CEFTRIAXONE SODIUM 1 G: 1 INJECTION, POWDER, FOR SOLUTION INTRAMUSCULAR; INTRAVENOUS at 17:46

## 2023-09-19 RX ADMIN — FUROSEMIDE 40 MG: 10 INJECTION, SOLUTION INTRAMUSCULAR; INTRAVENOUS at 22:49

## 2023-09-19 RX ADMIN — IOPAMIDOL 90 ML: 755 INJECTION, SOLUTION INTRAVENOUS at 16:34

## 2023-09-19 RX ADMIN — ACETAMINOPHEN 650 MG: 325 TABLET ORAL at 17:07

## 2023-09-19 RX ADMIN — IPRATROPIUM BROMIDE AND ALBUTEROL SULFATE 3 ML: .5; 3 SOLUTION RESPIRATORY (INHALATION) at 17:15

## 2023-09-19 ASSESSMENT — ACTIVITIES OF DAILY LIVING (ADL)
ADLS_ACUITY_SCORE: 35

## 2023-09-19 NOTE — ED TRIAGE NOTES
Pt arrives via [facility] for evaluation of SOB and CP since 0900 today. Pt states the chest pain aches and radiates to both of her shoulders. Per EMS, pt 88% on RA on scene. Pt 93% on RA now. GCS 15.      Triage Assessment       Row Name 09/19/23 1458       Triage Assessment (Adult)    Airway WDL WDL       Respiratory WDL    Respiratory WDL X;rhythm/pattern;expansion/retractions;mucous membranes;cough    Rhythm/Pattern, Respiratory dyspnea on exertion;tachypneic;shortness of breath;shallow;pursed lip breathing;unlabored    Expansion/Accessory Muscles/Retractions abdominal muscle use    Mucous Membranes dry       Skin Circulation/Temperature WDL    Skin Circulation/Temperature WDL circulation    Skin Circulation cyanosis       Cardiac WDL    Cardiac WDL X;chest pain       Chest Pain Assessment    Chest Pain Location shoulder, left;shoulder, right    Chest Pain Radiation shoulder;back    Character aching    Precipitating Factors activity    Alleviating Factors rest    Associated Signs/Symptoms tachycardia    Chest Pain Intervention cardiac biomarkers drawn;cardiac monitoring continued;cardiac monitor placed;12-lead ECG obtained;activity minimized       Peripheral/Neurovascular WDL    Peripheral Neurovascular WDL capillary refill    Capillary Refill, General greater than 3 secs       Cognitive/Neuro/Behavioral WDL    Cognitive/Neuro/Behavioral WDL WDL

## 2023-09-19 NOTE — ED PROVIDER NOTES
EMERGENCY DEPARTMENT ENCOUNTER      NAME: Makayla Dow  AGE: 82 year old female  YOB: 1940  MRN: [id number]  EVALUATION DATE & TIME: 2023  2:47 PM    PCP: Jerzy Spencer    ED PROVIDER: Chivo Gallegos M.D.      Chief Complaint   Patient presents with    Shortness of Breath    Chest Pain         FINAL IMPRESSION:  1. Hypoxia    2. Fever, unspecified fever cause    3. Chest pain, unspecified type          ED COURSE & MEDICAL DECISION MAKIN year old female presents to the Emergency Department for evaluation of chest pain, shortness of breath.  Patient has a history of wheezing, obesity, hypertension.  She presents with the above concerns.  She is febrile to one 1.2 when she arrives to the emergency department and also found to be hypoxic persistently to the upper 80s.  She has scattered expiratory wheezing on her lung exam which is otherwise clear.  She underwent lab testing which was notable for an elevated white blood cell count and CRP, concerning for systemic infection.  CT of her chest was performed.  This was negative for pulmonary embolism, focal pneumonia but did show some air trapping, possible atelectasis.  Thing in the setting of fever, hypoxia, she might benefit from antibiotics.  Regarding her chest pain, EKG on arrival is nonischemic.  High-sensitivity troponin x2 reveals mild elevation, no significant rise.  Could be related to baseline pulmonary hypertension, acute illness, etc. with primary ACS seeming less likely at this point.  Given the hypoxia, fever we discussed admitting her to the hospital.  After cultures obtained, patient started on ceftriaxone and azithromycin for probable pulmonary source of fever and infection.  Discussed case with hospitalist    Rex time stamps:  2:58 PM I met with patient for initial interview and encounter. We also discussed plan for treatment and diagnostic interventions.   6:17 PM discussed case with  hospitalist      At the conclusion of the encounter I discussed the results of all of the tests and the disposition. The questions were answered. The patient or family acknowledged understanding and was agreeable with the care plan.     Medical Decision Making    History:  Supplemental history from: N/A  External Record(s) reviewed: Documented in chart, if applicable.    Work Up:  Chart documentation includes differential considered and any EKGs or imaging independently interpreted by provider, where specified.  In additional to work up documented, I considered the following work up: Documented in chart, if applicable.    External consultation:  Discussion of management with another provider: Documented in chart, if applicable    Complicating factors:  Care impacted by chronic illness: Other: RA  Care affected by social determinants of health: Access to Medical Care    Disposition considerations: Admit.    MEDICATIONS GIVEN IN THE EMERGENCY:  Medications   azithromycin (ZITHROMAX) 500 mg in sodium chloride 0.9 % 250 mL intermittent infusion (has no administration in time range)   ipratropium - albuterol 0.5 mg/2.5 mg/3 mL (DUONEB) neb solution 3 mL (3 mLs Nebulization $Given 9/19/23 1715)   iopamidol (ISOVUE-370) solution 90 mL (90 mLs Intravenous $Given 9/19/23 1634)   cefTRIAXone (ROCEPHIN) 1 g vial to attach to  mL bag for ADULTS or NS 50 mL bag for PEDS (1 g Intravenous $New Bag 9/19/23 1746)   acetaminophen (TYLENOL) tablet 650 mg (650 mg Oral $Given 9/19/23 1707)       NEW PRESCRIPTIONS STARTED AT TODAY'S ER VISIT  New Prescriptions    No medications on file        =================================================================    HPI    Patient information was obtained from: Patient    Use of : N/A     Makayla Dow is a 82 year old female with a pertinent history of rheumatoid arthritis who presents to this ED by EMS for evaluation of chest pain, shortness of breath.    Patient  "reports an acute onset of chest and shoulder blade \"soreness\" that began this morning around 0900 while drinking coffee. This episode only lasted about 30 minutes, however, she complains of an accompanying chills and feeling generally tremulous. At present, she endorses some shortness of breath, but notes that the rest of her aforementioned symptoms have resolved.     She is not on any baseline oxygen. Reports some chronic leg swelling, no acute changes. No known fever. Denies cough.    REVIEW OF SYSTEMS   All systems reviewed and negative except as noted in HPI.    PAST MEDICAL HISTORY:  History reviewed. No pertinent past medical history.    PAST SURGICAL HISTORY:  History reviewed. No pertinent surgical history.      CURRENT MEDICATIONS:    Current Facility-Administered Medications   Medication    azithromycin (ZITHROMAX) 500 mg in sodium chloride 0.9 % 250 mL intermittent infusion     Current Outpatient Medications   Medication    aspirin (ASPIRIN LOW DOSE) 81 MG EC tablet    cloNIDine HCL (CATAPRES) 0.1 MG tablet    levothyroxine (SYNTHROID, LEVOTHROID) 50 MCG tablet    lisinopriL (PRINIVIL,ZESTRIL) 10 MG tablet    lovastatin (MEVACOR) 20 MG tablet    multivitamin therapeutic tablet    omega 3-dha-epa-fish oil (FISH OIL) 1,000 mg (120 mg-180 mg) cap    pramipexole (MIRAPEX) 0.125 MG tablet    traMADoL (ULTRAM) 50 mg tablet         ALLERGIES:  Allergies   Allergen Reactions    Hydroxychloroquine Itching    Ampicillin Rash    Naproxen Itching       FAMILY HISTORY:  No family history on file.    SOCIAL HISTORY:   Social History     Socioeconomic History    Marital status:      Spouse name: None    Number of children: None    Years of education: None    Highest education level: None   Tobacco Use    Smoking status: Never   Substance and Sexual Activity    Alcohol use: Not Currently       VITALS:  /86   Pulse 85   Temp (!) 101.2  F (38.4  C) (Oral)   Resp 18   SpO2 98%     PHYSICAL EXAM  "   Constitutional: Obese chronically ill-appearing elderly female patient, sitting up in bed, no acute distress  HENT: Normocephalic, Atraumatic. Neck Supple.  Eyes: EOMI, Conjunctiva normal.  Respiratory: Breathing is unlabored on 2 L supplemental oxygen via nasal cannula.  Lung sounds are largely clear with some scattered end expiratory wheezing.  Cardiovascular: Normal heart rate, Regular rhythm. No peripheral edema.  Abdomen: Soft, nontender  Musculoskeletal: Good range of motion in all major joints. No major deformities noted.  Integument: Warm, Dry.  Neurologic: Alert & awake, Normal motor function, Normal sensory function, No focal deficits noted.   Psychiatric: Cooperative. Affect appropriate.     LAB:  All pertinent labs reviewed and interpreted.  Labs Ordered and Resulted from Time of ED Arrival to Time of ED Departure   BASIC METABOLIC PANEL - Abnormal       Result Value    Sodium 140      Potassium 3.8      Chloride 104      Carbon Dioxide (CO2) 26      Anion Gap 10      Urea Nitrogen 15.5      Creatinine 0.70      Calcium 8.7 (*)     Glucose 115 (*)     GFR Estimate 85     TROPONIN T, HIGH SENSITIVITY - Abnormal    Troponin T, High Sensitivity 36 (*)    CBC WITH PLATELETS AND DIFFERENTIAL - Abnormal    WBC Count 14.2 (*)     RBC Count 3.83      Hemoglobin 11.3 (*)     Hematocrit 35.6      MCV 93      MCH 29.5      MCHC 31.7      RDW 12.8      Platelet Count 193      % Neutrophils 87      % Lymphocytes 4      % Monocytes 8      % Eosinophils 0      % Basophils 0      % Immature Granulocytes 1      NRBCs per 100 WBC 0      Absolute Neutrophils 12.3 (*)     Absolute Lymphocytes 0.6 (*)     Absolute Monocytes 1.1      Absolute Eosinophils 0.1      Absolute Basophils 0.0      Absolute Immature Granulocytes 0.1      Absolute NRBCs 0.0     TROPONIN T, HIGH SENSITIVITY - Abnormal    Troponin T, High Sensitivity 37 (*)    CRP INFLAMMATION - Abnormal    CRP Inflammation 35.70 (*)    INFLUENZA A/B, RSV, & SARS-COV2  PCR - Normal    Influenza A PCR Negative      Influenza B PCR Negative      RSV PCR Negative      SARS CoV2 PCR Negative     NT PROBNP INPATIENT - Normal    N terminal Pro BNP Inpatient 975     PROCALCITONIN   BLOOD CULTURE   BLOOD CULTURE       RADIOLOGY:  Reviewed all pertinent imaging. Please see official radiology report.  CT Chest Pulmonary Embolism w Contrast   Final Result   IMPRESSION:      1.  Motion artifact.      2.  No pulmonary embolism identified.      3.  Slight pulmonary trunk enlargement; correlate for pulmonary hypertension.      4.  Nonaneurysmal aorta without dissection.      5.  Mild opacities in the lungs, likely atelectasis. Pulmonary mosaicism, suggesting small airways disease / gas trapping.      6.  Large hiatus hernia.      7.  Borderline cardiac silhouette enlargement.      8.  Motion artifact compromises evaluation, though exophytic density off the distal pancreatic tail; indeterminate for exophytic pancreatic parenchyma versus lesion. Recommend dedicated CT pancreas imaging (can be done nonemergent).             EKG:    Performed at: 15:11    Impression: Normal sinus rhythm, no acute appearing ST changes    Rate: 96 bpm  Rhythm: Sinus  Axis: 29  NV Interval: 168  QRS Interval: 76  QTc Interval: 452  ST Changes: None  Comparison: When compared to EKG from 8/27/20, no significant changes noted.     I have independently reviewed and interpreted the EKG(s) documented above.    I, Dav Pizarro, am serving as a scribe to document services personally performed by Dr. Chivo Gallegos, based on my observation and the provider's statements to me. I, Chivo Gallegos MD attest that Dav Pizarro is acting in a scribe capacity, has observed my performance of the services and has documented them in accordance with my direction.    Chivo Gallegos M.D.  Emergency Medicine  [facility] EMERGENCY DEPARTMENT  [address] [place] [id number] [phone]  Dept:  [phone]       Chivo Gallegos MD  09/19/23 5287

## 2023-09-19 NOTE — H&P
[facility]    History and Physical - Hospitalist Service       Date of Admission:  9/19/2023    Assessment & Plan    Makayla Dow is a 82 year old female admitted on 9/19/2023. She presented to the ER with fever, chest discomfort of 1 day duration.  She was found to have oxygen saturation in the high 80s.  Past medical history significant for hypertension and hypothyroidism    Acute hypoxic respiratory failure secondary to possible acute bronchitis versus heart failure  -Patient presented with oxygen saturation of high 80s  -Has low-grade fever with leukocytosis and elevated procalcitonin  -Placed on 2 L oxygen via nasal cannula  -Taper as able  -Continue ceftriaxone and azithromycin  -IV Lasix 40 mg IV twice daily  -Monitor daily weights, inputs and outputs  -Follow-up echocardiogram    Elevated troponin  Chest discomfort  Initial troponin was elevated at 36 followed by 37  No acute ST segment changes on EKG  Telemetry monitor  Follow echocardiogram    Hypothyroidism  -PTA levothyroxine    Imaging finding  -Large hiatal hernia  -Patient should follow with surgery as an outpatient     Diet:  Cardiac diet  DVT Prophylaxis: Enoxaparin (Lovenox) SQ  Wise Catheter: Not present  Lines: None     Cardiac Monitoring: None  Code Status:  Full code    Clinically Significant Risk Factors Present on Admission                # Drug Induced Platelet Defect: home medication list includes an antiplatelet medication   # Hypertension: Home medication list includes antihypertensive(s)                 Disposition Plan      Expected Discharge Date: 09/21/2023                  Tyesha Martinez MD  Hospitalist Service  [facility]  Securely message with Guardly (more info)  Text page via Picklive Paging/Directory     ______________________________________________________________________    Chief Complaint   Chest discomfort, fever/1 day duration    History is obtained from the  patient    History of Present Illness   Makayla Dow is a 82 year old female who presented with the above complaint. Past medical history significant for hypertension and hypothyroidism.  Patient also reported having fever chills and rigors.  Oxygen saturations found to be on high 80s upon arrival.  Placed on 2 L of oxygen via nasal cannula.  Chest CT is not indicative of pneumonia. BNP not elevated  She will be admitted for management of acute bronchitis versus heart failure.      Past Medical History    History reviewed. No pertinent past medical history.    Past Surgical History   History reviewed. No pertinent surgical history.    Prior to Admission Medications   Prior to Admission Medications   Prescriptions Last Dose Informant Patient Reported? Taking?   aspirin (ASPIRIN LOW DOSE) 81 MG EC tablet   Yes No   Sig: [ASPIRIN (ASPIRIN LOW DOSE) 81 MG EC TABLET] Take 81 mg by mouth daily.   cloNIDine HCL (CATAPRES) 0.1 MG tablet   No No   Sig: [CLONIDINE HCL (CATAPRES) 0.1 MG TABLET] Take 1 tablet (0.1 mg total) by mouth 2 (two) times a day.   levothyroxine (SYNTHROID, LEVOTHROID) 50 MCG tablet   Yes No   Sig: [LEVOTHYROXINE (SYNTHROID, LEVOTHROID) 50 MCG TABLET] Take 50 mcg by mouth daily.   lisinopriL (PRINIVIL,ZESTRIL) 10 MG tablet   Yes No   Sig: [LISINOPRIL (PRINIVIL,ZESTRIL) 10 MG TABLET] Take 10 mg by mouth daily.   lovastatin (MEVACOR) 20 MG tablet   Yes No   Sig: [LOVASTATIN (MEVACOR) 20 MG TABLET] Take 20 mg by mouth at bedtime.   multivitamin therapeutic tablet   Yes No   Sig: [MULTIVITAMIN THERAPEUTIC TABLET] Take 1 tablet by mouth daily.   omega 3-dha-epa-fish oil (FISH OIL) 1,000 mg (120 mg-180 mg) cap   Yes No   Sig: [OMEGA 3-DHA-EPA-FISH OIL (FISH OIL) 1,000 MG (120 MG-180 MG) CAP] Take by mouth.   pramipexole (MIRAPEX) 0.125 MG tablet   Yes No   Sig: [PRAMIPEXOLE (MIRAPEX) 0.125 MG TABLET] Take 0.125 mg by mouth at bedtime.   traMADoL (ULTRAM) 50 mg tablet   Yes No   Sig: [TRAMADOL (ULTRAM)  50 MG TABLET] Take 50 mg by mouth 2 (two) times a day.      Facility-Administered Medications: None           Physical Exam   Vital Signs: Temp: (!) 101.2  F (38.4  C) Temp src: Oral BP: (!) 141/65 Pulse: 93   Resp: 19 SpO2: 97 % O2 Device: Nasal cannula Oxygen Delivery: 2 LPM  Weight: 0 lbs 0 oz    General Appearance: No distress noted  Respiratory: Diminished air entry bilaterally basilarly  Cardiovascular: S1 and S2 well heard, no murmur or gallop  GI: Soft abdomen, no tenderness, normoactive bowel sounds  Skin: Chronic venous stasis skin changes on bilateral legs      Medical Decision Making       75 MINUTES SPENT BY ME on the date of service doing chart review, history, exam, documentation & further activities per the note.      Data

## 2023-09-19 NOTE — ED NOTES
This RN went to pull second set of blood cultures off pt's PIV, line would not return anymore blood and infiltrated when flushed. PIV removed. No concern for medications or contrast extravasation as one set of blood culture was pulled from line after prior med administration. PIV likely got bumped when pt went to restroom.

## 2023-09-19 NOTE — ED NOTES
Bed: [facility]-  Expected date: 9/19/23  Expected time:   Means of arrival:   Comments:  [facility]  82 famale CP,SOB, 12 lead negative. On oxygen

## 2023-09-19 NOTE — PROGRESS NOTES
ED RESPIRATORY CARE NOTE     Pt on 2L nasal cannula; SpO2 94%.  Breath sounds clear.  Pt  respirations regular; reports no shortness of breath.  Discussed with MD; will hold Duoneb for now.      BP (!) 145/60   Pulse 85   Temp (!) 101.2  F (38.4  C) (Oral)   Resp 22   SpO2 94%      Wale Rivera, RT

## 2023-09-20 ENCOUNTER — APPOINTMENT (OUTPATIENT)
Dept: CARDIOLOGY | Facility: HOSPITAL | Age: 83
DRG: 871 | End: 2023-09-20
Attending: STUDENT IN AN ORGANIZED HEALTH CARE EDUCATION/TRAINING PROGRAM
Payer: COMMERCIAL

## 2023-09-20 ENCOUNTER — APPOINTMENT (OUTPATIENT)
Dept: CT IMAGING | Facility: HOSPITAL | Age: 83
DRG: 871 | End: 2023-09-20
Attending: STUDENT IN AN ORGANIZED HEALTH CARE EDUCATION/TRAINING PROGRAM
Payer: COMMERCIAL

## 2023-09-20 PROBLEM — E78.5 HYPERLIPIDEMIA LDL GOAL <100: Status: ACTIVE | Noted: 2023-09-20

## 2023-09-20 LAB
ANION GAP SERPL CALCULATED.3IONS-SCNC: 11 MMOL/L (ref 7–15)
BUN SERPL-MCNC: 12.4 MG/DL (ref 8–23)
CALCIUM SERPL-MCNC: 8.6 MG/DL (ref 8.8–10.2)
CHLORIDE SERPL-SCNC: 104 MMOL/L (ref 98–107)
CREAT SERPL-MCNC: 0.68 MG/DL (ref 0.51–0.95)
DEPRECATED HCO3 PLAS-SCNC: 27 MMOL/L (ref 22–29)
EGFRCR SERPLBLD CKD-EPI 2021: 85 ML/MIN/1.73M2
ENTEROCOCCUS FAECALIS: NOT DETECTED
ENTEROCOCCUS FAECIUM: NOT DETECTED
ERYTHROCYTE [DISTWIDTH] IN BLOOD BY AUTOMATED COUNT: 13.2 % (ref 10–15)
GLUCOSE SERPL-MCNC: 100 MG/DL (ref 70–99)
HBA1C MFR BLD: 5.9 %
HCT VFR BLD AUTO: 35.7 % (ref 35–47)
HGB BLD-MCNC: 11.1 G/DL (ref 11.7–15.7)
LISTERIA SPECIES (DETECTED/NOT DETECTED): NOT DETECTED
MAGNESIUM SERPL-MCNC: 1.7 MG/DL (ref 1.7–2.3)
MCH RBC QN AUTO: 29.6 PG (ref 26.5–33)
MCHC RBC AUTO-ENTMCNC: 31.1 G/DL (ref 31.5–36.5)
MCV RBC AUTO: 95 FL (ref 78–100)
MRSA DNA SPEC QL NAA+PROBE: NEGATIVE
NT-PROBNP SERPL-MCNC: 2193 PG/ML (ref 0–1800)
PLATELET # BLD AUTO: 177 10E3/UL (ref 150–450)
POTASSIUM SERPL-SCNC: 3.5 MMOL/L (ref 3.4–5.3)
POTASSIUM SERPL-SCNC: 3.6 MMOL/L (ref 3.4–5.3)
RBC # BLD AUTO: 3.75 10E6/UL (ref 3.8–5.2)
SA TARGET DNA: POSITIVE
SODIUM SERPL-SCNC: 142 MMOL/L (ref 136–145)
STAPHYLOCOCCUS AUREUS: DETECTED
STAPHYLOCOCCUS EPIDERMIDIS: NOT DETECTED
STAPHYLOCOCCUS LUGDUNENSIS: NOT DETECTED
STREPTOCOCCUS AGALACTIAE: NOT DETECTED
STREPTOCOCCUS ANGINOSUS GROUP: NOT DETECTED
STREPTOCOCCUS PNEUMONIAE: NOT DETECTED
STREPTOCOCCUS PYOGENES: NOT DETECTED
STREPTOCOCCUS SPECIES: NOT DETECTED
TSH SERPL DL<=0.005 MIU/L-ACNC: 2.98 UIU/ML (ref 0.3–4.2)
WBC # BLD AUTO: 13 10E3/UL (ref 4–11)

## 2023-09-20 PROCEDURE — 255N000002 HC RX 255 OP 636: Performed by: STUDENT IN AN ORGANIZED HEALTH CARE EDUCATION/TRAINING PROGRAM

## 2023-09-20 PROCEDURE — 83735 ASSAY OF MAGNESIUM: CPT | Performed by: STUDENT IN AN ORGANIZED HEALTH CARE EDUCATION/TRAINING PROGRAM

## 2023-09-20 PROCEDURE — 250N000013 HC RX MED GY IP 250 OP 250 PS 637: Performed by: STUDENT IN AN ORGANIZED HEALTH CARE EDUCATION/TRAINING PROGRAM

## 2023-09-20 PROCEDURE — 74160 CT ABDOMEN W/CONTRAST: CPT

## 2023-09-20 PROCEDURE — 258N000003 HC RX IP 258 OP 636: Performed by: STUDENT IN AN ORGANIZED HEALTH CARE EDUCATION/TRAINING PROGRAM

## 2023-09-20 PROCEDURE — 85014 HEMATOCRIT: CPT | Performed by: STUDENT IN AN ORGANIZED HEALTH CARE EDUCATION/TRAINING PROGRAM

## 2023-09-20 PROCEDURE — 120N000001 HC R&B MED SURG/OB

## 2023-09-20 PROCEDURE — 250N000011 HC RX IP 250 OP 636: Performed by: STUDENT IN AN ORGANIZED HEALTH CARE EDUCATION/TRAINING PROGRAM

## 2023-09-20 PROCEDURE — 84443 ASSAY THYROID STIM HORMONE: CPT | Performed by: STUDENT IN AN ORGANIZED HEALTH CARE EDUCATION/TRAINING PROGRAM

## 2023-09-20 PROCEDURE — 84132 ASSAY OF SERUM POTASSIUM: CPT | Performed by: STUDENT IN AN ORGANIZED HEALTH CARE EDUCATION/TRAINING PROGRAM

## 2023-09-20 PROCEDURE — 93306 TTE W/DOPPLER COMPLETE: CPT | Mod: 26 | Performed by: INTERNAL MEDICINE

## 2023-09-20 PROCEDURE — 250N000011 HC RX IP 250 OP 636: Mod: JZ | Performed by: STUDENT IN AN ORGANIZED HEALTH CARE EDUCATION/TRAINING PROGRAM

## 2023-09-20 PROCEDURE — 36415 COLL VENOUS BLD VENIPUNCTURE: CPT | Performed by: STUDENT IN AN ORGANIZED HEALTH CARE EDUCATION/TRAINING PROGRAM

## 2023-09-20 PROCEDURE — 999N000208 ECHOCARDIOGRAM COMPLETE

## 2023-09-20 PROCEDURE — 99233 SBSQ HOSP IP/OBS HIGH 50: CPT | Performed by: STUDENT IN AN ORGANIZED HEALTH CARE EDUCATION/TRAINING PROGRAM

## 2023-09-20 PROCEDURE — 87641 MR-STAPH DNA AMP PROBE: CPT | Performed by: STUDENT IN AN ORGANIZED HEALTH CARE EDUCATION/TRAINING PROGRAM

## 2023-09-20 PROCEDURE — 80048 BASIC METABOLIC PNL TOTAL CA: CPT | Performed by: STUDENT IN AN ORGANIZED HEALTH CARE EDUCATION/TRAINING PROGRAM

## 2023-09-20 PROCEDURE — 83880 ASSAY OF NATRIURETIC PEPTIDE: CPT | Performed by: STUDENT IN AN ORGANIZED HEALTH CARE EDUCATION/TRAINING PROGRAM

## 2023-09-20 PROCEDURE — 83036 HEMOGLOBIN GLYCOSYLATED A1C: CPT | Performed by: STUDENT IN AN ORGANIZED HEALTH CARE EDUCATION/TRAINING PROGRAM

## 2023-09-20 RX ORDER — AMOXICILLIN 250 MG
2 CAPSULE ORAL 2 TIMES DAILY PRN
Status: DISCONTINUED | OUTPATIENT
Start: 2023-09-21 | End: 2023-09-29 | Stop reason: HOSPADM

## 2023-09-20 RX ORDER — CEFAZOLIN SODIUM 1 G/50ML
2000 SOLUTION INTRAVENOUS ONCE
Status: COMPLETED | OUTPATIENT
Start: 2023-09-20 | End: 2023-09-20

## 2023-09-20 RX ORDER — IOPAMIDOL 755 MG/ML
90 INJECTION, SOLUTION INTRAVASCULAR ONCE
Status: COMPLETED | OUTPATIENT
Start: 2023-09-20 | End: 2023-09-20

## 2023-09-20 RX ADMIN — POLYETHYLENE GLYCOL 3350 17 G: 17 POWDER, FOR SOLUTION ORAL at 20:01

## 2023-09-20 RX ADMIN — IOPAMIDOL 90 ML: 755 INJECTION, SOLUTION INTRAVENOUS at 18:43

## 2023-09-20 RX ADMIN — ENOXAPARIN SODIUM 40 MG: 40 INJECTION SUBCUTANEOUS at 08:17

## 2023-09-20 RX ADMIN — ACETAMINOPHEN 650 MG: 325 TABLET, FILM COATED ORAL at 21:30

## 2023-09-20 RX ADMIN — FUROSEMIDE 40 MG: 10 INJECTION, SOLUTION INTRAMUSCULAR; INTRAVENOUS at 08:17

## 2023-09-20 RX ADMIN — OXYBUTYNIN CHLORIDE 10 MG: 10 TABLET, EXTENDED RELEASE ORAL at 08:20

## 2023-09-20 RX ADMIN — VANCOMYCIN HYDROCHLORIDE 2000 MG: 500 INJECTION, POWDER, LYOPHILIZED, FOR SOLUTION INTRAVENOUS at 14:20

## 2023-09-20 RX ADMIN — LEVOTHYROXINE SODIUM 50 MCG: 0.03 TABLET ORAL at 05:28

## 2023-09-20 RX ADMIN — Medication 81 MG: at 08:18

## 2023-09-20 RX ADMIN — AZITHROMYCIN MONOHYDRATE 500 MG: 250 TABLET ORAL at 08:18

## 2023-09-20 RX ADMIN — PRAVASTATIN SODIUM 20 MG: 20 TABLET ORAL at 08:20

## 2023-09-20 RX ADMIN — FUROSEMIDE 40 MG: 10 INJECTION, SOLUTION INTRAMUSCULAR; INTRAVENOUS at 17:18

## 2023-09-20 RX ADMIN — PERFLUTREN 2 ML: 6.52 INJECTION, SUSPENSION INTRAVENOUS at 10:00

## 2023-09-20 RX ADMIN — ENOXAPARIN SODIUM 40 MG: 40 INJECTION SUBCUTANEOUS at 20:01

## 2023-09-20 ASSESSMENT — ACTIVITIES OF DAILY LIVING (ADL)
ADLS_ACUITY_SCORE: 37
DEPENDENT_IADLS:: INDEPENDENT
ADLS_ACUITY_SCORE: 44
ADLS_ACUITY_SCORE: 37
ADLS_ACUITY_SCORE: 44
ADLS_ACUITY_SCORE: 44
ADLS_ACUITY_SCORE: 37
ADLS_ACUITY_SCORE: 37
ADLS_ACUITY_SCORE: 44
ADLS_ACUITY_SCORE: 44

## 2023-09-20 NOTE — ED NOTES
Bed: [facility]-36  Expected date:   Expected time:   Means of arrival:   Comments:  [facility] H when boarder orders

## 2023-09-20 NOTE — ED NOTES
Pt stated her back is very uncomfortable. Pt is sitting on side of bed to try and help with back discomfort.

## 2023-09-20 NOTE — PLAN OF CARE
Problem: Plan of Care - These are the overarching goals to be used throughout the patient stay.    Goal: Absence of Hospital-Acquired Illness or Injury  Outcome: Progressing  Intervention: Identify and Manage Fall Risk  Recent Flowsheet Documentation  Taken 9/19/2023 2200 by Elena, Carie COHEN RN  Safety Promotion/Fall Prevention: activity supervised  Intervention: Prevent Skin Injury  Recent Flowsheet Documentation  Taken 9/19/2023 2200 by Carie Parker RN  Body Position: position changed independently     Problem: Plan of Care - These are the overarching goals to be used throughout the patient stay.    Goal: Optimal Comfort and Wellbeing  Outcome: Progressing   Goal Outcome Evaluation:       Pt denies pain.Oxygen on 2 L. Pt received 40 mg IV lasix. At hs. Pt put on pure wick

## 2023-09-20 NOTE — MEDICATION SCRIBE - ADMISSION MEDICATION HISTORY
Medication Scribe Admission Medication History    Admission medication history is complete. The information provided in this note is only as accurate as the sources available at the time of the update.    Medication reconciliation/reorder completed by provider prior to medication history? No    Information Source(s): Patient via in-person    Pertinent Information: Patient reported she administers her own meds,  in room with her.    Changes made to PTA medication list:  Added: Advil, Oxybutynin   Deleted: Lisinopril, Catapres, Mirapex, Tramadol all meds were said to be outdated.  Changed: None    Medication Affordability:  Not including over the counter (OTC) medications, was there a time in the past 3 months when you did not take your medications as prescribed because of cost?: No    Allergies reviewed with patient and updates made in EHR: yes    Medication History Completed By: Denice Whiting 9/19/2023 9:26 PM  PTA Med List   Medication Sig Last Dose    aspirin (ASPIRIN LOW DOSE) 81 MG EC tablet [ASPIRIN (ASPIRIN LOW DOSE) 81 MG EC TABLET] Take 81 mg by mouth daily. 9/19/2023 at am    ibuprofen (ADVIL/MOTRIN) 200 MG tablet Take 200 mg by mouth every 4 hours as needed for pain 9/19/2023 at am    levothyroxine (SYNTHROID, LEVOTHROID) 50 MCG tablet [LEVOTHYROXINE (SYNTHROID, LEVOTHROID) 50 MCG TABLET] Take 50 mcg by mouth daily. 9/19/2023 at am    lovastatin (MEVACOR) 20 MG tablet [LOVASTATIN (MEVACOR) 20 MG TABLET] Take 20 mg by mouth at bedtime. 9/18/2023 at pm    multivitamin therapeutic tablet [MULTIVITAMIN THERAPEUTIC TABLET] Take 1 tablet by mouth daily. 9/19/2023 at am    omega 3-dha-epa-fish oil (FISH OIL) 1,000 mg (120 mg-180 mg) cap [OMEGA 3-DHA-EPA-FISH OIL (FISH OIL) 1,000 MG (120 MG-180 MG) CAP] Take by mouth. 9/19/2023 at am    oxyBUTYnin ER (DITROPAN XL) 10 MG 24 hr tablet Take 10 mg by mouth daily 9/19/2023 at am

## 2023-09-20 NOTE — PHARMACY-VANCOMYCIN DOSING SERVICE
Pharmacy Vancomycin Initial Note  Date of Service 2023  Patient's  1940  82 year old, female    Indication: Bacteremia    Current estimated CrCl = Estimated Creatinine Clearance: 72.4 mL/min (based on SCr of 0.68 mg/dL).    Creatinine for last 3 days  2023:  3:17 PM Creatinine 0.70 mg/dL  2023:  7:45 AM Creatinine 0.68 mg/dL    Recent Vancomycin Level(s) for last 3 days  No results found for requested labs within last 3 days.      Vancomycin IV Administrations (past 72 hours)        No vancomycin orders with administrations in past 72 hours.                    Nephrotoxins and other renal medications (From now, onward)      Start     Dose/Rate Route Frequency Ordered Stop    23 1400  vancomycin (VANCOCIN) 1,500 mg in sodium chloride 0.9 % 250 mL intermittent infusion         1,500 mg  over 90 Minutes Intravenous EVERY 24 HOURS 23 1340      23 1400  vancomycin (VANCOCIN) 2,000 mg in sodium chloride 0.9 % 500 mL intermittent infusion         2,000 mg  over 2 Hours Intravenous ONCE 23 1337      23 2230  furosemide (LASIX) injection 40 mg         40 mg  over 1-3 Minutes Intravenous 2 TIMES DAILY (Diuretics and Nitrates) 23 2202              Contrast Orders - past 72 hours (72h ago, onward)      Start     Dose/Rate Route Frequency Stop    23 1000  perflutren lipid microsphere (DEFINITY) injection SUSP 2 mL         2 mL Intravenous ONCE 23 1000    23 1630  iopamidol (ISOVUE-370) solution 90 mL         90 mL Intravenous ONCE 23 1634            [employer] Prediction of Planned Initial Vancomycin Regimen  Loading dose: 2000 mg at 14:00 2023.  Regimen: 1500 mg IV every 24 hours.  Start time: 13:39 on 2023  Exposure target: AUC24 (range)400-600 mg/L.hr   AUC24,ss: 537 mg/L.hr  Probability of AUC24 > 400: 72 %  Ctrough,ss: 13.3 mg/L  Probability of Ctrough,ss > 20: 31 %  Probability of nephrotoxicity (Lodise AINSLEY ): 8 %           Plan:  Start vancomycin  2000 mg loading dose today then 1500 mg IV q24h.   Vancomycin monitoring method: AUC  Vancomycin therapeutic monitoring goal: 400-600 mg*h/L  Pharmacy will check vancomycin levels as appropriate in 1-3 Days.    Serum creatinine levels will be ordered  daily x 3 days then reassess .      Christi Pruitt RPH

## 2023-09-20 NOTE — PROGRESS NOTES
[facility]    Medicine Progress Note - Hospitalist Service    Date of Admission:  9/19/2023    Assessment & Plan   Makayla Dow is a 82 year old female admitted on 9/19/2023. She presented to the ER with fever, chest discomfort of 1 day duration.  She was found to have oxygen saturation in the high 80s.  Past medical history significant for HTN, Hypothyroidism, ELIZABETH (non compliant with CPAP)       Acute hypoxic respiratory failure secondary to possible acute bronchitis versus heart failure  -Patient presented with oxygen saturation of high 80s, now sat 96% on 5L NC  - CTA Chest: No pulmonary embolism,    Slight pulmonary trunk enlargement; correlate for pulmonary hypertension.   Mild opacities in the lungs, likely atelectasis. Large hiatus hernia  - Echo: Normal left ventricular size and systolic performance with a visually  estimated ejection fraction of 60%. Mild biatrial enlargement  -Monitor daily weights, inputs and outputs, Fluid restriction  -BNP elevated    - IV lasix 40mg BID  - Discontinued Ceftriaxone, Azithromycin  - Wean off oxygen as tolerated  - Cardiology eval appreciated      # Sepsis secondary to Bacteremia  CRP elevated, leukocytosis, Febrile on arrival (meets SIRS criterion)  Bcx: Gram positive cocci in Clusters, Staph aureus  Echo: no vegetations  - second Bcx pending  - ID eval appreciated  - Started Vancomycin  - MRSA nares  - UA, UCx  - WBC trend       Elevated troponin  Chest discomfort, resolved  Initial troponin was elevated at 36 followed by 37  Downtrended to 29  No acute ST segment changes on EKG      Pancreatic lesion vs mass  -exophytic density off the distal pancreatic tail; indeterminate for exophytic pancreatic parenchyma versus lesion. Recommend dedicated CT pancreas imaging   - CT Pancreas w/ contrast       Hypothyroidism  TSH wnl  -PTA levothyroxine    Hyperlipidemia  - Statin    Severe Obesity  BMI: 44.74    Imaging finding  -Large hiatal  "hernia  -Patient should follow with surgery as an outpatient       Diet: Combination Diet Regular Diet Adult    DVT Prophylaxis: Enoxaparin (Lovenox) SQ  Wise Catheter: Not present  Lines: None     Cardiac Monitoring: None  Code Status: Full Code      Clinically Significant Risk Factors Present on Admission                # Drug Induced Platelet Defect: home medication list includes an antiplatelet medication        # Severe Obesity: Estimated body mass index is 44.74 kg/m  as calculated from the following:    Height as of this encounter: 1.575 m (5' 2\").    Weight as of this encounter: 110.9 kg (244 lb 9.6 oz).                   Expected Discharge Date: 09/20/2023                  Shanda Barrow MD  Hospitalist Service  [facility]  Securely message with [employer] (more info)  Text page via [employer] Paging/Directory   ______________________________________________________________________    Interval History   Patient states she feels better  Wanted senna as she was constipated for 2 days  Denies any complaints      Physical Exam   Vital Signs: Temp: 97.8  F (36.6  C) Temp src: Oral BP: (!) 155/69 Pulse: 64   Resp: 15 SpO2: 97 % O2 Device: Nasal cannula Oxygen Delivery: 2 LPM  Weight: 244 lbs 9.6 oz    General Appearance:  No distress noted  Respiratory: Diminished air entry bilaterally basilarly, + crackles bilaterally  Cardiovascular: S1 and S2 well heard, no murmur or gallop  GI: Soft abdomen, no tenderness, normoactive bowel sounds  Skin: Chronic venous stasis skin changes on bilateral legs, + edema b/l      Medical Decision Making       40 MINUTES SPENT BY ME on the date of service doing chart review, history, exam, documentation & further activities per the note.      Data     I have personally reviewed the following data over the past 24 hrs:    13.0 (H)  \   11.1 (L)   / 177     142 104 12.4 /  100 (H)   3.5 27 0.68 \     Trop: 29 (H) BNP: 2,193 (H)     TSH: 2.98 T4: N/A A1C: 5.9 (H) "     Procal: 0.88 (H) CRP: 35.70 (H) Lactic Acid: N/A         Imaging results reviewed over the past 24 hrs:   Recent Results (from the past 24 hour(s))   CT Chest Pulmonary Embolism w Contrast    Narrative    EXAM: CT CHEST PULMONARY EMBOLISM W CONTRAST  LOCATION: [facility]  DATE: 9/19/2023    INDICATION: Chest pain. Hypoxia. Edema.  COMPARISON: None.  TECHNIQUE: CT chest pulmonary angiogram during arterial phase injection of IV contrast. Multiplanar reformats and MIP reconstructions were performed. Dose reduction techniques were used.   CONTRAST: isovue 370 90ml    FINDINGS:  ANGIOGRAM CHEST: Mild motion artifact. No pulmonary embolism identified. Pulmonary trunk enlargement at 3.1 cm. Nonaneurysmal aorta without dissection.    LUNGS AND PLEURA: Expiratory phase of imaging and mosaicism. Mild atelectasis. No pleural effusion or pneumothorax.    MEDIASTINUM/AXILLAE: No adenopathy. Borderline cardiac enlargement. No pericardial effusion. Large hiatus hernia.    CORONARY ARTERY CALCIFICATION: Motion artifact. At least mild.    UPPER ABDOMEN: Motion artifact, questionable exophytic density off the distal pancreatic tail (series 6, image 85).    MUSCULOSKELETAL: Bony demineralization and degenerative changes.      Impression    IMPRESSION:    1.  Motion artifact.    2.  No pulmonary embolism identified.    3.  Slight pulmonary trunk enlargement; correlate for pulmonary hypertension.    4.  Nonaneurysmal aorta without dissection.    5.  Mild opacities in the lungs, likely atelectasis. Pulmonary mosaicism, suggesting small airways disease / gas trapping.    6.  Large hiatus hernia.    7.  Borderline cardiac silhouette enlargement.    8.  Motion artifact compromises evaluation, though exophytic density off the distal pancreatic tail; indeterminate for exophytic pancreatic parenchyma versus lesion. Recommend dedicated CT pancreas imaging (can be done nonemergent).     Echocardiogram Complete     [facility]    [id number]  [id number]  [id number]  [id number]^STEPHIE^[place] [address]     Name: EUGENE CRUZ  MRN: [id number]  : 1940  Study Date: 2023 09:32 AM  Age: 82 yrs  Gender: Female  Patient Location: [facility]  Reason For Study: SOB  Ordering Physician: VANNA CARD  Performed By: AT     BSA: 2.1 m2  Height: 62 in  Weight: 244 lb  HR: 75  BP: 146/64 mmHg  ______________________________________________________________________________  Procedure  Complete Echo Adult. Definity (NDC #42909-172) given intravenously.  ______________________________________________________________________________  Interpretation Summary     1. Normal left ventricular size and systolic performance with a visually  estimated ejection fraction of 60%.  2. No significant valvular heart disease is identified on this study.  3. Normal right ventricular size and systolic performance.  4. There is mild biatrial enlargement.  ______________________________________________________________________________  Left ventricle:  Normal left ventricular size and systolic performance with a visually  estimated ejection fraction of 60%. There is normal regional wall motion. Left  ventricular wall thickness is normal.     Assessment of LV Diastolic Function: The cumulative findings suggest impaired  diastolic filling [The septal e' velocity is < 7 cm/s & lateral e' velocity  is  < 10 cm/s. The average E/e' is >14. TR velocity is < 2.8 m/s. Left atrial  volume index is greater than 34 mL/mÂ ].     Assessment of left atrial pressure (LAP): The cumulative findings suggest  moderately elevated left atrial pressure (the E/A is > 0.8 and <2.0 plus 2 or  3 of 3 of the following present: Average E/e' > 14, TRvel > 2.8 m/s, and/or LA  vol. index > 34 ml/mÂ  ).     Right ventricle:  Probable normal right ventricular size and systolic performance though right-  sided structures are  not clearly visualized on all views on this study.     Left atrium:  There is mild left atrial enlargement.     Right atrium:  There is mild right atrial enlargement.     IVC:  The IVC is of normal caliber.     Aortic valve:  The aortic valve is comprised of three cusps. No significant aortic stenosis  or aortic insufficiency is detected on this study.     Mitral valve:  The mitral valve appears morphologically normal. There is trace mitral  insufficiency.     Tricuspid valve:  The tricuspid valve is grossly morphologically normal. There is trace  tricuspid insufficiency.     Pulmonic valve:  The pulmonic valve is grossly morphologically normal.     Thoracic aorta:  The aortic root and proximal ascending aorta are of normal dimension.     Pericardium:  There is no significant pericardial effusion.  ______________________________________________________________________________  ______________________________________________________________________________  MMode/2D Measurements & Calculations  IVSd: 0.88 cm  LVIDd: 4.4 cm  LVIDs: 2.1 cm  LVPWd: 0.83 cm  FS: 53.0 %  LV mass(C)d: 120.0 grams  LV mass(C)dI: 57.7 grams/m2  Ao root diam: 2.7 cm  LA dimension: 3.5 cm  asc Aorta Diam: 3.4 cm  LA/Ao: 1.3  LVOT diam: 2.0 cm  LVOT area: 3.0 cm2  Ao root diam index Ht(cm/m): 1.7  Ao root diam index BSA (cm/m2): 1.3  Asc Ao diam index BSA (cm/m2): 1.6  Asc Ao diam index Ht(cm/m): 2.2     LA Volume Indexed (AL/bp): 34.6 ml/m2  RV Base: 4.2 cm  RWT: 0.37  TAPSE: 2.3 cm     Time Measurements  MM HR: 75.0 BPM     Doppler Measurements & Calculations  MV E max stanton: 107.0 cm/sec  MV A max stanton: 93.6 cm/sec  MV E/A: 1.1  MV max P.7 mmHg  MV mean P.9 mmHg  MV V2 VTI: 30.5 cm  MVA(VTI): 3.4 cm2  MV dec slope: 480.1 cm/sec2  MV dec time: 0.22 sec  Ao V2 max: 196.6 cm/sec  Ao max PG: 15.0 mmHg  Ao V2 mean: 134.8 cm/sec  Ao mean P.7 mmHg  Ao V2 VTI: 45.5 cm  LUZ(I,D): 2.3 cm2  LUZ(V,D): 2.5 cm2     LV V1 max PG: 10.3 mmHg  LV V1  max: 160.4 cm/sec  LV V1 VTI: 34.0 cm  SV(LVOT): 103.4 ml  SI(LVOT): 49.7 ml/m2  PA acc time: 0.09 sec  TR max henri: 212.4 cm/sec  TR max P.0 mmHg  AV Henri Ratio (DI): 0.82  LUZ Index (cm2/m2): 1.1  E/E': 16.5  E/E' av.7  Lateral E/e': 17.0  Medial E/e': 16.4  Peak E' Henri: 6.5 cm/sec  RV S Henri: 14.7 cm/sec     ______________________________________________________________________________  Report approved by: Wes Gunderson 2023 10:46 AM

## 2023-09-20 NOTE — CONSULTS
Care Management Initial Consult    General Information  Assessment completed with: Patient,    Type of CM/SW Visit: Initial Assessment    Primary Care Provider verified and updated as needed: Yes   Readmission within the last 30 days: no previous admission in last 30 days      Reason for Consult: discharge planning  Advance Care Planning: Advance Care Planning Reviewed: no concerns identified          Communication Assessment  Patient's communication style: spoken language (English or Bilingual)             Cognitive  Cognitive/Neuro/Behavioral: WDL                      Living Environment:   People in home: spouse     Current living Arrangements: house      Able to return to prior arrangements:         Family/Social Support:  Care provided by: self  Provides care for: no one  Marital Status:     [place]       Description of Support System: Supportive, Involved         Current Resources:   Patient receiving home care services: No     Community Resources: None  Equipment currently used at home:    Supplies currently used at home: None    Employment/Financial:  Employment Status: retired        Financial Concerns: No concerns identified           Does the patient's insurance plan have a 3 day qualifying hospital stay waiver?  No    Lifestyle & Psychosocial Needs:  Social Determinants of Health     Food Insecurity: Not on file   Depression: Not on file   Housing Stability: Not on file   Tobacco Use: Unknown (9/19/2023)    Patient History     Smoking Tobacco Use: Never     Smokeless Tobacco Use: Unknown     Passive Exposure: Not on file   Financial Resource Strain: Not on file   Alcohol Use: Not on file   Transportation Needs: Not on file   Physical Activity: Not on file   Interpersonal Safety: Not on file   Stress: Not on file   Social Connections: Not on file       Functional Status:  Prior to admission patient needed assistance:   Dependent ADLs:: Independent, Ambulation-walker, Ambulation-cane  Dependent  IADLs:: Independent       Mental Health Status:          Chemical Dependency Status:                Values/Beliefs:  Spiritual, Cultural Beliefs, Quaker Practices, Values that affect care:                 Additional Information:  Assessed. Pt lives with her  in a house. Pt is independent with ADLs and IADLs. Uses a walker and cane for mobility. No services.  to transport at discharge. Goal is home at discharge. No CM needs anticipated. CM to follow for medical progression, recommendations and final discharge plans.    Socorro Walker RN

## 2023-09-20 NOTE — PLAN OF CARE
Problem: Plan of Care - These are the overarching goals to be used throughout the patient stay.    Goal: Plan of Care Review  Description: The Plan of Care Review/Shift note should be completed every shift.  The Outcome Evaluation is a brief statement about your assessment that the patient is improving, declining, or no change.  This information will be displayed automatically on your shift note.  Outcome: Progressing     Problem: Plan of Care - These are the overarching goals to be used throughout the patient stay.    Goal: Optimal Comfort and Wellbeing  Outcome: Progressing     Goal Outcome Evaluation:    Pt alert and oriented, denies chest pain.  Purewick in place with good U/O- 1400 ml out since midnight.  2L/NC sating >92%

## 2023-09-20 NOTE — PLAN OF CARE
Goal Outcome Evaluation:      Plan of Care Reviewed With: patient    Overall Patient Progress: improving    Outcome Evaluation: Patient had positive blood cultures today, Abx started. Now on 1L O2, lungs have some fine crackles in the bases, continues on IV lasix.

## 2023-09-20 NOTE — PLAN OF CARE
Problem: Plan of Care - These are the overarching goals to be used throughout the patient stay.    Goal: Optimal Comfort and Wellbeing  Outcome: Progressing     Problem: Infection  Goal: Absence of Infection Signs and Symptoms  Outcome: Progressing     Problem: Risk for Delirium  Goal: Improved Attention and Thought Clarity  Outcome: Adequate for Care Transition   Goal Outcome Evaluation:       Patient is A/O x4. BP elevated in 150's, respirations at 30 this afternoon. Patient has c/o of leg cramping which nursing staff informed hospitalist about. Hospitalist recommended using acetaminophen for leg pains.     Report called to P1 and patient transferred via stretcher.

## 2023-09-21 LAB
ALBUMIN UR-MCNC: NEGATIVE MG/DL
ANION GAP SERPL CALCULATED.3IONS-SCNC: 9 MMOL/L (ref 7–15)
APPEARANCE UR: CLEAR
BACTERIA #/AREA URNS HPF: ABNORMAL /HPF
BILIRUB UR QL STRIP: NEGATIVE
BUN SERPL-MCNC: 10.3 MG/DL (ref 8–23)
CALCIUM SERPL-MCNC: 8.6 MG/DL (ref 8.8–10.2)
CHLORIDE SERPL-SCNC: 98 MMOL/L (ref 98–107)
COLOR UR AUTO: ABNORMAL
CREAT SERPL-MCNC: 0.73 MG/DL (ref 0.51–0.95)
DEPRECATED HCO3 PLAS-SCNC: 33 MMOL/L (ref 22–29)
EGFRCR SERPLBLD CKD-EPI 2021: 81 ML/MIN/1.73M2
ERYTHROCYTE [DISTWIDTH] IN BLOOD BY AUTOMATED COUNT: 13.1 % (ref 10–15)
GLUCOSE SERPL-MCNC: 110 MG/DL (ref 70–99)
GLUCOSE UR STRIP-MCNC: NEGATIVE MG/DL
HCT VFR BLD AUTO: 36.6 % (ref 35–47)
HGB BLD-MCNC: 11.5 G/DL (ref 11.7–15.7)
HGB UR QL STRIP: ABNORMAL
KETONES UR STRIP-MCNC: NEGATIVE MG/DL
LEUKOCYTE ESTERASE UR QL STRIP: NEGATIVE
MCH RBC QN AUTO: 29.5 PG (ref 26.5–33)
MCHC RBC AUTO-ENTMCNC: 31.4 G/DL (ref 31.5–36.5)
MCV RBC AUTO: 94 FL (ref 78–100)
NITRATE UR QL: NEGATIVE
PH UR STRIP: 6.5 [PH] (ref 5–7)
PLATELET # BLD AUTO: 212 10E3/UL (ref 150–450)
POTASSIUM SERPL-SCNC: 3.6 MMOL/L (ref 3.4–5.3)
RBC # BLD AUTO: 3.9 10E6/UL (ref 3.8–5.2)
RBC URINE: <1 /HPF
SODIUM SERPL-SCNC: 140 MMOL/L (ref 136–145)
SP GR UR STRIP: 1.03 (ref 1–1.03)
SQUAMOUS EPITHELIAL: <1 /HPF
UROBILINOGEN UR STRIP-MCNC: <2 MG/DL
WBC # BLD AUTO: 10.3 10E3/UL (ref 4–11)
WBC URINE: <1 /HPF

## 2023-09-21 PROCEDURE — 250N000013 HC RX MED GY IP 250 OP 250 PS 637: Performed by: STUDENT IN AN ORGANIZED HEALTH CARE EDUCATION/TRAINING PROGRAM

## 2023-09-21 PROCEDURE — 120N000001 HC R&B MED SURG/OB

## 2023-09-21 PROCEDURE — 85027 COMPLETE CBC AUTOMATED: CPT | Performed by: STUDENT IN AN ORGANIZED HEALTH CARE EDUCATION/TRAINING PROGRAM

## 2023-09-21 PROCEDURE — 99223 1ST HOSP IP/OBS HIGH 75: CPT | Performed by: INTERNAL MEDICINE

## 2023-09-21 PROCEDURE — 36415 COLL VENOUS BLD VENIPUNCTURE: CPT | Performed by: STUDENT IN AN ORGANIZED HEALTH CARE EDUCATION/TRAINING PROGRAM

## 2023-09-21 PROCEDURE — 250N000011 HC RX IP 250 OP 636: Mod: JZ | Performed by: INTERNAL MEDICINE

## 2023-09-21 PROCEDURE — 99233 SBSQ HOSP IP/OBS HIGH 50: CPT | Performed by: STUDENT IN AN ORGANIZED HEALTH CARE EDUCATION/TRAINING PROGRAM

## 2023-09-21 PROCEDURE — 250N000011 HC RX IP 250 OP 636: Mod: JZ | Performed by: STUDENT IN AN ORGANIZED HEALTH CARE EDUCATION/TRAINING PROGRAM

## 2023-09-21 PROCEDURE — 81001 URINALYSIS AUTO W/SCOPE: CPT | Performed by: STUDENT IN AN ORGANIZED HEALTH CARE EDUCATION/TRAINING PROGRAM

## 2023-09-21 PROCEDURE — 87040 BLOOD CULTURE FOR BACTERIA: CPT | Performed by: STUDENT IN AN ORGANIZED HEALTH CARE EDUCATION/TRAINING PROGRAM

## 2023-09-21 PROCEDURE — 80048 BASIC METABOLIC PNL TOTAL CA: CPT | Performed by: STUDENT IN AN ORGANIZED HEALTH CARE EDUCATION/TRAINING PROGRAM

## 2023-09-21 PROCEDURE — 99222 1ST HOSP IP/OBS MODERATE 55: CPT | Performed by: STUDENT IN AN ORGANIZED HEALTH CARE EDUCATION/TRAINING PROGRAM

## 2023-09-21 PROCEDURE — 250N000013 HC RX MED GY IP 250 OP 250 PS 637: Performed by: INTERNAL MEDICINE

## 2023-09-21 RX ORDER — SPIRONOLACTONE 25 MG
12.5 TABLET ORAL DAILY
Status: DISCONTINUED | OUTPATIENT
Start: 2023-09-21 | End: 2023-09-29 | Stop reason: HOSPADM

## 2023-09-21 RX ORDER — FUROSEMIDE 10 MG/ML
60 INJECTION INTRAMUSCULAR; INTRAVENOUS EVERY 8 HOURS
Status: DISCONTINUED | OUTPATIENT
Start: 2023-09-21 | End: 2023-09-22

## 2023-09-21 RX ORDER — LOSARTAN POTASSIUM 25 MG/1
25 TABLET ORAL DAILY
Status: DISCONTINUED | OUTPATIENT
Start: 2023-09-21 | End: 2023-09-29 | Stop reason: HOSPADM

## 2023-09-21 RX ORDER — CEFAZOLIN SODIUM 2 G/100ML
2 INJECTION, SOLUTION INTRAVENOUS EVERY 8 HOURS
Status: DISCONTINUED | OUTPATIENT
Start: 2023-09-21 | End: 2023-09-29 | Stop reason: HOSPADM

## 2023-09-21 RX ADMIN — OXYBUTYNIN CHLORIDE 10 MG: 10 TABLET, EXTENDED RELEASE ORAL at 07:51

## 2023-09-21 RX ADMIN — LOSARTAN POTASSIUM 25 MG: 25 TABLET, FILM COATED ORAL at 11:23

## 2023-09-21 RX ADMIN — FUROSEMIDE 40 MG: 10 INJECTION, SOLUTION INTRAMUSCULAR; INTRAVENOUS at 07:51

## 2023-09-21 RX ADMIN — SPIRONOLACTONE 12.5 MG: 25 TABLET ORAL at 11:23

## 2023-09-21 RX ADMIN — LEVOTHYROXINE SODIUM 50 MCG: 0.03 TABLET ORAL at 05:18

## 2023-09-21 RX ADMIN — CEFAZOLIN SODIUM 2 G: 2 INJECTION, SOLUTION INTRAVENOUS at 14:44

## 2023-09-21 RX ADMIN — Medication 81 MG: at 07:51

## 2023-09-21 RX ADMIN — ENOXAPARIN SODIUM 40 MG: 40 INJECTION SUBCUTANEOUS at 21:01

## 2023-09-21 RX ADMIN — CEFAZOLIN SODIUM 2 G: 2 INJECTION, SOLUTION INTRAVENOUS at 21:07

## 2023-09-21 RX ADMIN — PRAVASTATIN SODIUM 20 MG: 20 TABLET ORAL at 07:51

## 2023-09-21 RX ADMIN — ENOXAPARIN SODIUM 40 MG: 40 INJECTION SUBCUTANEOUS at 07:51

## 2023-09-21 RX ADMIN — ACETAMINOPHEN 650 MG: 325 TABLET, FILM COATED ORAL at 13:24

## 2023-09-21 RX ADMIN — FUROSEMIDE 60 MG: 10 INJECTION, SOLUTION INTRAMUSCULAR; INTRAVENOUS at 16:36

## 2023-09-21 ASSESSMENT — ACTIVITIES OF DAILY LIVING (ADL)
TOILETING: 1-->ASSISTANCE (EQUIPMENT/PERSON) NEEDED
ADLS_ACUITY_SCORE: 35
HEARING_DIFFICULTY_OR_DEAF: NO
CHANGE_IN_FUNCTIONAL_STATUS_SINCE_ONSET_OF_CURRENT_ILLNESS/INJURY: YES
CONCENTRATING,_REMEMBERING_OR_MAKING_DECISIONS_DIFFICULTY: NO
ADLS_ACUITY_SCORE: 44
ADLS_ACUITY_SCORE: 35
NUMBER_OF_TIMES_PATIENT_HAS_FALLEN_WITHIN_LAST_SIX_MONTHS: 0
TOILETING: 1-->ASSISTANCE (EQUIPMENT/PERSON) NEEDED (NOT DEVELOPMENTALLY APPROPRIATE)
DIFFICULTY_EATING/SWALLOWING: NO
WALKING_OR_CLIMBING_STAIRS_DIFFICULTY: YES
ADLS_ACUITY_SCORE: 35
FALL_HISTORY_WITHIN_LAST_SIX_MONTHS: NO
DIFFICULTY_COMMUNICATING: NO
TRANSFERRING: 1-->ASSISTANCE (EQUIPMENT/PERSON) NEEDED (NOT DEVELOPMENTALLY APPROPRIATE)
WEAR_GLASSES_OR_BLIND: NO
TRANSFERRING: 2-->COMPLETELY DEPENDENT
ADLS_ACUITY_SCORE: 35
DOING_ERRANDS_INDEPENDENTLY_DIFFICULTY: YES
TOILETING_ASSISTANCE: TOILETING DIFFICULTY, REQUIRES EQUIPMENT;TOILETING DIFFICULTY, ASSISTANCE 1 PERSON
ADLS_ACUITY_SCORE: 44
EQUIPMENT_CURRENTLY_USED_AT_HOME: CANE, STRAIGHT
ADLS_ACUITY_SCORE: 35
ADLS_ACUITY_SCORE: 35
TOILETING_ISSUES: YES
ADLS_ACUITY_SCORE: 34
ADLS_ACUITY_SCORE: 44
WALKING_OR_CLIMBING_STAIRS: AMBULATION DIFFICULTY, REQUIRES EQUIPMENT
ADLS_ACUITY_SCORE: 38
ADLS_ACUITY_SCORE: 35
DRESSING/BATHING_DIFFICULTY: NO

## 2023-09-21 NOTE — PLAN OF CARE
"  Problem: Plan of Care - These are the overarching goals to be used throughout the patient stay.    Goal: Plan of Care Review  Description: The Plan of Care Review/Shift note should be completed every shift.  The Outcome Evaluation is a brief statement about your assessment that the patient is improving, declining, or no change.  This information will be displayed automatically on your shift note.  Outcome: Progressing  Goal: Patient-Specific Goal (Individualized)  Description: You can add care plan individualizations to a care plan. Examples of Individualization might be:  \"Parent requests to be called daily at 9am for status\", \"I have a hard time hearing out of my right ear\", or \"Do not touch me to wake me up as it startles me\".  Outcome: Progressing  Goal: Absence of Hospital-Acquired Illness or Injury  Outcome: Progressing  Intervention: Identify and Manage Fall Risk  Recent Flowsheet Documentation  Taken 9/21/2023 0045 by Nnamdi Cole RN  Safety Promotion/Fall Prevention: activity supervised  Taken 9/20/2023 2000 by Nnamdi Cole RN  Safety Promotion/Fall Prevention: activity supervised  Goal: Optimal Comfort and Wellbeing  Outcome: Progressing  Goal: Readiness for Transition of Care  Outcome: Progressing     Problem: Risk for Delirium  Goal: Optimal Coping  Outcome: Progressing  Goal: Improved Behavioral Control  Outcome: Progressing  Goal: Improved Sleep  Outcome: Progressing     Problem: Infection  Goal: Absence of Infection Signs and Symptoms  Outcome: Progressing   Goal Outcome Evaluation:  Pt alert and oriented x4. PRN tylenol given for pain. Pt reports relief with it. Adequate urine output. Assist of 1 to the bathroom. Urine culture sent. No concerns noted overnight. Nursing to continue monitoring.    "

## 2023-09-21 NOTE — CONSULTS
Consultation - INFECTIOUS DISEASE CONSULTATION  Makayla Dow,  1940, MRN [id number]      Hypoxia [R09.02]  Hyperlipidemia LDL goal <100 [E78.5]  Fever, unspecified fever cause [R50.9]  Chest pain, unspecified type     PCP: Jerzy Spencer, [phone]   Code status:  Full Code               Chief Complaint: Bacteremia     Assessment:  Makayla Dow is a 82 year old old female with   Status post bilateral TKA.  Some mild pain bilaterally worse in the left prosthetic knee.  Acute hypoxic respiratory failure.  Resolved.  Bacteremia with Staphylococcus aureus.  MSSA per Verigene.  Positive blood culture on 2023.  Source of bacteremia is not clear.  Has superficial excoriation on the left ankle that could be report of entry.  Mild pain in both prosthetic knees worse on the left side. On IV vancomycin.  Allergy to ampicillin.  Reaction rash.    Recommendations:   Daily blood cultures.  Discontinue IV vancomycin   Start IV Ancef  Monitor for rash  Follow susceptibility of the Staph aureus.  Monitor prosthetic knees.  May need to obtain imaging if pain worsens    Discussed with the patient, nursing staff.    Thank you for letting us be part of the patient care team. We will follow.    Cash Tse MD,MD  [facility].   [facility]  Office Telephone [phone].  Fax [phone]  [facility] paging    HPI:    Makayla Dow is a 82 year old old female. History is provided by the patient, chart review.  ID is asked to see this patient for Staph aureus bacteremia.  The patient has a history of bilateral TKA.  She has been doing well up until the day of admission when she started to have chest pain.  No fever or chills prior to the onset of the chest pain.  She also became short of breath and found on admission to have hypoxia with oxygen saturation at 84.  Blood cultures now positive with Staph aureus.  On IV vancomycin.   Remote history of ampicillin allergy with reaction being rash.  Seen today, she declines any back pain.        ===========================================    Medical History  History reviewed. No pertinent past medical history.     Surgical History  History reviewed. No pertinent surgical history.         Social History  Reviewed, and she  reports that she has never smoked. She does not have any smokeless tobacco history on file. She reports that she does not currently use alcohol.        Family History  No family history of recurrent infections.   Psychosocial Needs  Social History     Social History Narrative    Not on file     Additional psychosocial needs reviewed per nursing assessment.       Allergies   Allergen Reactions    Hydroxychloroquine Itching    Ampicillin Rash    Naproxen Itching        Medications Prior to Admission   Medication Sig Dispense Refill Last Dose    aspirin (ASPIRIN LOW DOSE) 81 MG EC tablet [ASPIRIN (ASPIRIN LOW DOSE) 81 MG EC TABLET] Take 81 mg by mouth daily.   9/19/2023 at am    ibuprofen (ADVIL/MOTRIN) 200 MG tablet Take 200 mg by mouth every 4 hours as needed for pain   9/19/2023 at am    levothyroxine (SYNTHROID, LEVOTHROID) 50 MCG tablet [LEVOTHYROXINE (SYNTHROID, LEVOTHROID) 50 MCG TABLET] Take 50 mcg by mouth daily.   9/19/2023 at am    lovastatin (MEVACOR) 20 MG tablet [LOVASTATIN (MEVACOR) 20 MG TABLET] Take 20 mg by mouth at bedtime.   9/18/2023 at pm    multivitamin therapeutic tablet [MULTIVITAMIN THERAPEUTIC TABLET] Take 1 tablet by mouth daily.   9/19/2023 at am    omega 3-dha-epa-fish oil (FISH OIL) 1,000 mg (120 mg-180 mg) cap [OMEGA 3-DHA-EPA-FISH OIL (FISH OIL) 1,000 MG (120 MG-180 MG) CAP] Take by mouth.   9/19/2023 at am    oxyBUTYnin ER (DITROPAN XL) 10 MG 24 hr tablet Take 10 mg by mouth daily   9/19/2023 at am        Review of Systems:  Negative except for findings in the HPI Physical Exam:  Temp:  [98  F (36.7  C)-99.1  F (37.3  C)] 98.2  F (36.8  C)  Pulse:   [70-81] 70  Resp:  [18-31] 18  BP: (135-164)/(54-72) 164/72  SpO2:  [91 %-95 %] 95 %    Gen: Pleasant in no acute distress.  HEENT: NCAT. EOMI. PERRL.  Neck: No bruit, JVD or thyromegaly.  Lungs: Clear to ascultation bilat with no crackles or wheezes.  Card: RRR. NSR. No RMG. Peripheral pulses present and symmetric. No edema.  Abd: Soft NT ND. No mass. Normal bowel sounds.  Skin: No rash.  Extr: Bilateral TKA with mild pain on palpation of the knees worse on the left side.  No clear evidence of infection  Neuro: Alert and oriented to place time and person.       ============================    Pertinent Labs  personally reviewed.   Recent Labs   Lab 09/21/23  0750 09/20/23  0745 09/19/23  1517   WBC 10.3 13.0* 14.2*   HGB 11.5* 11.1* 11.3*   HCT 36.6 35.7 35.6    177 193       Recent Labs   Lab 09/20/23  0745 09/19/23  1517    140   CO2 27 26   BUN 12.4 15.5       MICROBIOLOGY DATA:  Personally reviewed  Blood Culture Peripheral Blood  Order: [id number]  Collected 9/19/2023  5:15 PM       Status: Preliminary result       Visible to patient: No (not released)    Specimen Information: Peripheral Blood   0 Result Notes  Culture Positive on the 1st day of incubation Abnormal       Staphylococcus aureus Panic    1 of 2 bottles        Resulting Agency: [facility]           Specimen Collected: 09/19/23  5:15 PM Last Resulted: 09/21/23 12:25 AM             Pertinent Radiology  personally reviewed.   Study Result    Narrative & Impression   EXAM: CT CHEST PULMONARY EMBOLISM W CONTRAST  LOCATION: [facility]  DATE: 9/19/2023     INDICATION: Chest pain. Hypoxia. Edema.  COMPARISON: None.  TECHNIQUE: CT chest pulmonary angiogram during arterial phase injection of IV contrast. Multiplanar reformats and MIP reconstructions were performed. Dose reduction techniques were used.   CONTRAST: isovue 370 90ml     FINDINGS:  ANGIOGRAM CHEST: Mild motion artifact. No pulmonary embolism identified. Pulmonary  trunk enlargement at 3.1 cm. Nonaneurysmal aorta without dissection.     LUNGS AND PLEURA: Expiratory phase of imaging and mosaicism. Mild atelectasis. No pleural effusion or pneumothorax.     MEDIASTINUM/AXILLAE: No adenopathy. Borderline cardiac enlargement. No pericardial effusion. Large hiatus hernia.     CORONARY ARTERY CALCIFICATION: Motion artifact. At least mild.     UPPER ABDOMEN: Motion artifact, questionable exophytic density off the distal pancreatic tail (series 6, image 85).     MUSCULOSKELETAL: Bony demineralization and degenerative changes.                                                                      IMPRESSION:     1.  Motion artifact.     2.  No pulmonary embolism identified.     3.  Slight pulmonary trunk enlargement; correlate for pulmonary hypertension.     4.  Nonaneurysmal aorta without dissection.     5.  Mild opacities in the lungs, likely atelectasis. Pulmonary mosaicism, suggesting small airways disease / gas trapping.     6.  Large hiatus hernia.     7.  Borderline cardiac silhouette enlargement.     8.  Motion artifact compromises evaluation, though exophytic density off the distal pancreatic tail; indeterminate for exophytic pancreatic parenchyma versus lesion. Recommend dedicated CT pancreas imaging (can be done nonemergent).     Study Result    Narrative & Impression   EXAM: CT PANCREAS W CONTRAST  LOCATION: [facility]  DATE: 9/20/2023     INDICATION: exophytic pancreatic parenchyma versus lesion in distal pancreatic tail  COMPARISON: Chest CTA 09/19/2023  TECHNIQUE: CT abdomen using pancreas protocol before and after injection of IV contrast. Precontrast images through pancreas and dynamic post contrast through the abdomen during arterial and portal venous phases. Multiplanar reformats were obtained. Dose   reduction techniques were used.  CONTRAST: 90ml isovue 370     FINDINGS:   LOWER CHEST: Partially visualized large hiatal hernia with protrusion  of the stomach into the mediastinum. Stable, 7 mm nodular opacity at the posterior aspect of the right lung base (image #16 series 6). Subsegmental atelectasis, left lung base.     HEPATOBILIARY: Mildly distended gallbladder with mild pericholecystic fat stranding. Small gallstones. Liver is within normal limits.     PANCREAS: Moderate fatty atrophy of the pancreatic head and uncinate process. Tortuous pancreatic tail, accounting for the exophytic appearance on yesterday's chest CT. No pancreatic masses.     SPLEEN: Calcified splenic granulomas. Splenic size is within normal limits.     ADRENAL GLANDS: Normal.     KIDNEYS/BLADDER: 1.8 cm simple cyst, left kidney. No follow-up necessary for this. Both kidneys are otherwise negative. Moderately distended bladder.     BOWEL: No obstruction or inflammatory change. Left colonic diverticulosis. Appendix is within normal limits.     LYMPH NODES: Normal.     VASCULATURE: Moderate calcified atherosclerotic changes of a normal caliber abdominal aorta.     PELVIC ORGANS: Coarse uterine calcifications which most likely represent small, calcified fibroids.     MUSCULOSKELETAL: Multilevel degenerative disc disease. Degenerative changes lower lumbar facet joints.                                                                      IMPRESSION:   1.  Tortuous pancreatic tail accounting for the exophytic appearance on yesterday's chest CT. No pancreatic masses.  2.  Mildly dilated gallbladder with mild pericholecystic fat stranding suggesting cholecystitis. Small gallstones.  3.  Partially visualized large hiatal hernia.  4.  Stable 7 mm right lung base nodule.

## 2023-09-21 NOTE — PROGRESS NOTES
[facility]    Medicine Progress Note - Hospitalist Service    Date of Admission:  9/19/2023    Assessment & Plan   Makayla Dow is a 82 year old female admitted on 9/19/2023. She presented to the ER with fever, chest discomfort of 1 day duration.  She was found to have oxygen saturation in the high 80s.  Past medical history significant for HTN, Hypothyroidism, ELIZABETH (non compliant with CPAP)       Acute hypoxic respiratory failure secondary to HFpEF  Cardiogenic pulmonary edema 2/2 HFpEF  -Patient presented with oxygen saturation of high 80s, now sat 96% on 5L NC  - CTA Chest: No pulmonary embolism,    Slight pulmonary trunk enlargement; correlate for pulmonary hypertension.   Mild opacities in the lungs, likely atelectasis. Large hiatus hernia  - Echo: Normal left ventricular size and systolic performance with a visually  estimated ejection fraction of 60%. Mild biatrial enlargement  -Monitor daily weights, inputs and outputs, Fluid restriction  -BNP elevated  - Bicarb 33, contraction alkalosis  - am labs, will adjust diuretics accordingly  - Appreciate Cardio eval  - IV lasix, Spironolactone, Losartan  - Wean oxygen as tolerated  - Cardiology following      # Sepsis secondary to Bacteremia  CRP elevated, leukocytosis, Febrile on arrival (meets SIRS criterion)  Bcx: Gram positive cocci in Clusters, Staph aureus, MSSA, susceptibility pending  Echo: no vegetations  Repeat Bcx pending  ID eval appreciated  Vancomycin discontinued, On Ancef       Elevated troponin  Chest discomfort, resolved  Initial troponin was elevated at 36 followed by 37  Downtrended to 29  No acute ST segment changes on EKG      Pancreatic lesion vs mass  -exophytic density off the distal pancreatic tail; indeterminate for exophytic pancreatic parenchyma versus lesion. Recommend dedicated CT pancreas imaging   - CT Pancreas: no pancreatic mass       Hypothyroidism  TSH wnl  -PTA levothyroxine    Hyperlipidemia  -  "Statin    Severe Obesity  BMI: 44.74    Imaging finding  -Large hiatal hernia - follow up with surgery as outpatient  -7mm right lung base nodule - Follow up with PMD/ Pulm outpatient    -- PT eval pending       Diet: Combination Diet Regular Diet Adult    DVT Prophylaxis: Enoxaparin (Lovenox) SQ  Wise Catheter: Not present  Lines: None     Cardiac Monitoring: None  Code Status: Full Code      Clinically Significant Risk Factors                         # Severe Obesity: Estimated body mass index is 43.99 kg/m  as calculated from the following:    Height as of this encounter: 1.575 m (5' 2\").    Weight as of this encounter: 109.1 kg (240 lb 8.4 oz)., PRESENT ON ADMISSION            Disposition Plan      Expected Discharge Date: 09/22/2023      Destination: home with family  Discharge Comments: cultures/IV antibiotics          Shanda Barrow MD  Hospitalist Service  [facility]  Securely message with [employer] (more info)  Text page via [employer] Paging/Directory   ______________________________________________________________________    Interval History   Patient c/o Right shoulder pain, ROM normal, resolved with Tylenol  States she feels better and would like to get discharged soon  Spoke to her  Davidson Dow as patient wanted me to relay information.      Physical Exam   Vital Signs: Temp: 98.6  F (37  C) Temp src: Oral BP: 114/56 Pulse: 65   Resp: 17 SpO2: 93 % O2 Device: Nasal cannula Oxygen Delivery: 1 LPM  Weight: 240 lbs 8.35 oz    General Appearance:  No distress noted  Respiratory: Diminished air entry bilaterally basilarly, + mild crackles bilaterally  Cardiovascular: S1 and S2 well heard, no murmur or gallop  GI: Soft abdomen, no tenderness, normoactive bowel sounds  Skin: Chronic venous stasis skin changes on bilateral legs, + edema b/l improving       Medical Decision Making       35 MINUTES SPENT BY ME on the date of service doing chart review, history, exam, documentation & " further activities per the note.      Data     I have personally reviewed the following data over the past 24 hrs:    10.3  \   11.5 (L)   / 212     140 98 10.3 /  110 (H)   3.6 33 (H) 0.73 \       Imaging results reviewed over the past 24 hrs:   Recent Results (from the past 24 hour(s))   CT Pancreas w Contrast    Narrative    EXAM: CT PANCREAS W CONTRAST  LOCATION: [facility]  DATE: 9/20/2023    INDICATION: exophytic pancreatic parenchyma versus lesion in distal pancreatic tail  COMPARISON: Chest CTA 09/19/2023  TECHNIQUE: CT abdomen using pancreas protocol before and after injection of IV contrast. Precontrast images through pancreas and dynamic post contrast through the abdomen during arterial and portal venous phases. Multiplanar reformats were obtained. Dose   reduction techniques were used.  CONTRAST: 90ml isovue 370    FINDINGS:   LOWER CHEST: Partially visualized large hiatal hernia with protrusion of the stomach into the mediastinum. Stable, 7 mm nodular opacity at the posterior aspect of the right lung base (image #16 series 6). Subsegmental atelectasis, left lung base.    HEPATOBILIARY: Mildly distended gallbladder with mild pericholecystic fat stranding. Small gallstones. Liver is within normal limits.    PANCREAS: Moderate fatty atrophy of the pancreatic head and uncinate process. Tortuous pancreatic tail, accounting for the exophytic appearance on yesterday's chest CT. No pancreatic masses.    SPLEEN: Calcified splenic granulomas. Splenic size is within normal limits.    ADRENAL GLANDS: Normal.    KIDNEYS/BLADDER: 1.8 cm simple cyst, left kidney. No follow-up necessary for this. Both kidneys are otherwise negative. Moderately distended bladder.    BOWEL: No obstruction or inflammatory change. Left colonic diverticulosis. Appendix is within normal limits.    LYMPH NODES: Normal.    VASCULATURE: Moderate calcified atherosclerotic changes of a normal caliber abdominal  aorta.    PELVIC ORGANS: Coarse uterine calcifications which most likely represent small, calcified fibroids.    MUSCULOSKELETAL: Multilevel degenerative disc disease. Degenerative changes lower lumbar facet joints.      Impression    IMPRESSION:   1.  Tortuous pancreatic tail accounting for the exophytic appearance on yesterday's chest CT. No pancreatic masses.  2.  Mildly dilated gallbladder with mild pericholecystic fat stranding suggesting cholecystitis. Small gallstones.  3.  Partially visualized large hiatal hernia.  4.  Stable 7 mm right lung base nodule.    REFERENCE:  Guidelines for Management of Incidental Pulmonary Nodules Detected on CT Images: From the Fleischner Society 2017.   Guidelines apply to incidental nodules in patients who are 35 years or older.  Guidelines do not apply to lung cancer screening, patients with immunosuppression, or patients with known primary cancer.    SINGLE NODULE    Nodule size 6-8 mm  Low-risk patients: Follow-up CT at 6-12 months, then consider CT at 18-24 months.  High-risk patients: Follow-up CT at 6-12 months, then at 18-24 months if no change.

## 2023-09-21 NOTE — H&P (VIEW-ONLY)
"HEART CARE NOTE        Thank you, Dr. Martinez, for asking the [facility] team to see Makayla Dow to evaluate ADHF.      Assessment/Recommendations     1. HFpEF c/b severe ADHF  Assessment / Plan  Hypervolemic on physical exam, but diuresing well on IV loop diuretic - no changes at this time;  continue to monitor UOP, renal function closely  Patient is high risk of adverse cardiac events 2/2 advanced age  GDMT as detailed below; mainstay of treatment for HFpEF includes diuretics and adequate BP control (class I) and SGLT2-I (class 2a); additional medical therapy (ARNI, MRA, ARB) demonstrated less robust evidence for indication but may be considered per guideline recommendations (2b); no indication for BBlockers     Current Pharmacotherapy AHA Guideline-Directed Medical Therapy   Losartan  25 mg daily ARNI/ARB   Spironolactone 12.5 mg  MRA   SGLT2 inhibitor: not started SGLT2-I    Furosemide IV Loop diuretic      2. Acute hypoxic respiratory failure  Assessment / Plan  Likely 2/2 cardiogenic pulmonary edema in the setting of ADHF - diuresis as above    3. Mildly elevated high sensitivity troponin  Assessment / Plan  Mild elevation which is already trending down in the setting of ADHF - likely demand ischemia  Echo without WMAs or interval changes in cardiac structure or function  Patient currently chest pain free    4. HTN  Assessment / Plan  Inadequately controlled - add losartan as above     Clinically Significant Risk Factors                         # Severe Obesity: Estimated body mass index is 43.99 kg/m  as calculated from the following:    Height as of this encounter: 1.575 m (5' 2\").    Weight as of this encounter: 109.1 kg (240 lb 8.4 oz)., PRESENT ON ADMISSION           Cardiomyopathy  Diastolic acute    Fluid overload, unspecified, Other fluid overload, and Other disorders of electrolyte and fluid balance, not elsewhere classified      85 minutes spent reviewing prior " "records (including documentation, laboratory studies, cardiac testing/imaging), history and physical exam, planning, and subsequent documentation.    History of Present Illness/Subjective    Ms. Makayla Dow is a 82 year old female with a PMHx significant for (per Dr. Martinez' note) hypertension and hypothyroidism who presents in ADHF.    Today, Mrs. Jenna Dow reports sudden chest heaviness and dyspnea for a day before presenting; she denies any hx of CHF or heart disease; she reports that the heaviness has resolved with diuresis and better BP control; Management plan as detailed above    ECG: Personally reviewed. normal EKG, normal sinus rhythm, unchanged from previous tracings.    ECHO (personnaly Reviewed on 9/21/23):   1. Normal left ventricular size and systolic performance with a visually  estimated ejection fraction of 60%.  2. No significant valvular heart disease is identified on this study.  3. Normal right ventricular size and systolic performance.  4. There is mild biatrial enlargement.    Telemetry: personally reviewed September 21, 2023; notable for NSR     Lab results: personally reviewed September 21, 2023; notable for elevated NT-proBNP    Medical history and pertinent documents reviewed in Care Everywhere please where applicable see details above          Physical Examination Review of Systems   /54 (BP Location: Right arm)   Pulse 78   Temp 98.6  F (37  C) (Oral)   Resp 22   Ht 1.575 m (5' 2\")   Wt 109.1 kg (240 lb 8.4 oz)   SpO2 93%   BMI 43.99 kg/m    Body mass index is 43.99 kg/m .  Wt Readings from Last 3 Encounters:   09/20/23 109.1 kg (240 lb 8.4 oz)     General Appearance:   no distress, normal body habitus   ENT/Mouth: membranes moist, no oral lesions or bleeding gums.      EYES:  no scleral icterus, normal conjunctivae   Neck: no carotid bruits or thyromegaly   Chest/Lungs:   lungs are clear to auscultation, no rales or wheezing, equal chest wall expansion  "   Cardiovascular:   Regular. Normal first and second heart sounds with no murmurs, rubs, or gallops; the carotid, radial and posterior tibial pulses are intact, + JVD and LE edema bilaterally    Abdomen:  no organomegaly, masses, bruits, or tenderness; bowel sounds are present   Extremities: no cyanosis or clubbing   Skin: no xanthelasma, warm.    Neurologic: NAD     Psychiatric: alert and oriented x3, calm     A complete 10 systems ROS was reviewed  And is negative except what is listed in the HPI.          Medical History  Surgical History Family History Social History   History reviewed. No pertinent past medical history. History reviewed. No pertinent surgical history. no family history of premature coronary artery disease Social History     Socioeconomic History    Marital status:      Spouse name: Not on file    Number of children: Not on file    Years of education: Not on file    Highest education level: Not on file   Occupational History    Not on file   Tobacco Use    Smoking status: Never    Smokeless tobacco: Not on file   Substance and Sexual Activity    Alcohol use: Not Currently    Drug use: Not on file    Sexual activity: Not on file   Other Topics Concern    Not on file   Social History Narrative    Not on file     Social Determinants of Health     Financial Resource Strain: Not on file   Food Insecurity: Not on file   Transportation Needs: Not on file   Physical Activity: Not on file   Stress: Not on file   Social Connections: Not on file   Interpersonal Safety: Not on file   Housing Stability: Not on file           Lab Results    Chemistry/lipid CBC Cardiac Enzymes/BNP/TSH/INR   Lab Results   Component Value Date    CHOL 212 (H) 04/07/2023    HDL 94 04/07/2023    TRIG 97 04/07/2023    BUN 12.4 09/20/2023     09/20/2023    CO2 27 09/20/2023    Lab Results   Component Value Date    WBC 13.0 (H) 09/20/2023    HGB 11.1 (L) 09/20/2023    HCT 35.7 09/20/2023    MCV 95 09/20/2023      09/20/2023    Lab Results   Component Value Date    TROPONINI <0.01 08/27/2020     02/10/2022    TSH 2.98 09/20/2023     Lab Results   Component Value Date    TROPONINI <0.01 08/27/2020          Weight:    Wt Readings from Last 3 Encounters:   09/20/23 109.1 kg (240 lb 8.4 oz)       Allergies  Allergies   Allergen Reactions    Hydroxychloroquine Itching    Ampicillin Rash    Naproxen Itching         Surgical History  History reviewed. No pertinent surgical history.    Social History  Tobacco:   History   Smoking Status    Never   Smokeless Tobacco    Not on file    Alcohol:   Social History    Substance and Sexual Activity      Alcohol use: Not Currently   Illicit Drugs:   History   Drug Use Not on file       Family History  No family history on file.       Delfina Rios MD on 9/21/2023      cc: Jerzy Spencer,

## 2023-09-21 NOTE — PLAN OF CARE
Problem: Plan of Care - These are the overarching goals to be used throughout the patient stay.    Goal: Optimal Comfort and Wellbeing  Outcome: Progressing  Intervention: Monitor Pain and Promote Comfort  Recent Flowsheet Documentation  Taken 9/21/2023 1300 by Arlene Hung RN  Pain Management Interventions:   medication (see MAR)   MD notified (comment)     Problem: Infection  Goal: Absence of Infection Signs and Symptoms  Outcome: Progressing   Goal Outcome Evaluation:       Pt complained of right shoulder pain 9/10 around 1300. Provider paged due to pt's cardiac hx, no new orders at this time. Pain resolving with PO Tylenol. IV Ancef infusing.

## 2023-09-21 NOTE — CONSULTS
"HEART CARE NOTE        Thank you, Dr. Martinez, for asking the [facility] team to see Makayla Dow to evaluate ADHF.      Assessment/Recommendations     1. HFpEF c/b severe ADHF  Assessment / Plan  Hypervolemic on physical exam, but diuresing well on IV loop diuretic - no changes at this time;  continue to monitor UOP, renal function closely  Patient is high risk of adverse cardiac events 2/2 advanced age  GDMT as detailed below; mainstay of treatment for HFpEF includes diuretics and adequate BP control (class I) and SGLT2-I (class 2a); additional medical therapy (ARNI, MRA, ARB) demonstrated less robust evidence for indication but may be considered per guideline recommendations (2b); no indication for BBlockers     Current Pharmacotherapy AHA Guideline-Directed Medical Therapy   Losartan  25 mg daily ARNI/ARB   Spironolactone 12.5 mg  MRA   SGLT2 inhibitor: not started SGLT2-I    Furosemide IV Loop diuretic      2. Acute hypoxic respiratory failure  Assessment / Plan  Likely 2/2 cardiogenic pulmonary edema in the setting of ADHF - diuresis as above    3. Mildly elevated high sensitivity troponin  Assessment / Plan  Mild elevation which is already trending down in the setting of ADHF - likely demand ischemia  Echo without WMAs or interval changes in cardiac structure or function  Patient currently chest pain free    4. HTN  Assessment / Plan  Inadequately controlled - add losartan as above     Clinically Significant Risk Factors                         # Severe Obesity: Estimated body mass index is 43.99 kg/m  as calculated from the following:    Height as of this encounter: 1.575 m (5' 2\").    Weight as of this encounter: 109.1 kg (240 lb 8.4 oz)., PRESENT ON ADMISSION           Cardiomyopathy  Diastolic acute    Fluid overload, unspecified, Other fluid overload, and Other disorders of electrolyte and fluid balance, not elsewhere classified      85 minutes spent reviewing prior " "records (including documentation, laboratory studies, cardiac testing/imaging), history and physical exam, planning, and subsequent documentation.    History of Present Illness/Subjective    Ms. Makayla Dow is a 82 year old female with a PMHx significant for (per Dr. Martinez' note) hypertension and hypothyroidism who presents in ADHF.    Today, Mrs. Jenna Dow reports sudden chest heaviness and dyspnea for a day before presenting; she denies any hx of CHF or heart disease; she reports that the heaviness has resolved with diuresis and better BP control; Management plan as detailed above    ECG: Personally reviewed. normal EKG, normal sinus rhythm, unchanged from previous tracings.    ECHO (personnaly Reviewed on 9/21/23):   1. Normal left ventricular size and systolic performance with a visually  estimated ejection fraction of 60%.  2. No significant valvular heart disease is identified on this study.  3. Normal right ventricular size and systolic performance.  4. There is mild biatrial enlargement.    Telemetry: personally reviewed September 21, 2023; notable for NSR     Lab results: personally reviewed September 21, 2023; notable for elevated NT-proBNP    Medical history and pertinent documents reviewed in Care Everywhere please where applicable see details above          Physical Examination Review of Systems   /54 (BP Location: Right arm)   Pulse 78   Temp 98.6  F (37  C) (Oral)   Resp 22   Ht 1.575 m (5' 2\")   Wt 109.1 kg (240 lb 8.4 oz)   SpO2 93%   BMI 43.99 kg/m    Body mass index is 43.99 kg/m .  Wt Readings from Last 3 Encounters:   09/20/23 109.1 kg (240 lb 8.4 oz)     General Appearance:   no distress, normal body habitus   ENT/Mouth: membranes moist, no oral lesions or bleeding gums.      EYES:  no scleral icterus, normal conjunctivae   Neck: no carotid bruits or thyromegaly   Chest/Lungs:   lungs are clear to auscultation, no rales or wheezing, equal chest wall expansion  "   Cardiovascular:   Regular. Normal first and second heart sounds with no murmurs, rubs, or gallops; the carotid, radial and posterior tibial pulses are intact, + JVD and LE edema bilaterally    Abdomen:  no organomegaly, masses, bruits, or tenderness; bowel sounds are present   Extremities: no cyanosis or clubbing   Skin: no xanthelasma, warm.    Neurologic: NAD     Psychiatric: alert and oriented x3, calm     A complete 10 systems ROS was reviewed  And is negative except what is listed in the HPI.          Medical History  Surgical History Family History Social History   History reviewed. No pertinent past medical history. History reviewed. No pertinent surgical history. no family history of premature coronary artery disease Social History     Socioeconomic History    Marital status:      Spouse name: Not on file    Number of children: Not on file    Years of education: Not on file    Highest education level: Not on file   Occupational History    Not on file   Tobacco Use    Smoking status: Never    Smokeless tobacco: Not on file   Substance and Sexual Activity    Alcohol use: Not Currently    Drug use: Not on file    Sexual activity: Not on file   Other Topics Concern    Not on file   Social History Narrative    Not on file     Social Determinants of Health     Financial Resource Strain: Not on file   Food Insecurity: Not on file   Transportation Needs: Not on file   Physical Activity: Not on file   Stress: Not on file   Social Connections: Not on file   Interpersonal Safety: Not on file   Housing Stability: Not on file           Lab Results    Chemistry/lipid CBC Cardiac Enzymes/BNP/TSH/INR   Lab Results   Component Value Date    CHOL 212 (H) 04/07/2023    HDL 94 04/07/2023    TRIG 97 04/07/2023    BUN 12.4 09/20/2023     09/20/2023    CO2 27 09/20/2023    Lab Results   Component Value Date    WBC 13.0 (H) 09/20/2023    HGB 11.1 (L) 09/20/2023    HCT 35.7 09/20/2023    MCV 95 09/20/2023      09/20/2023    Lab Results   Component Value Date    TROPONINI <0.01 08/27/2020     02/10/2022    TSH 2.98 09/20/2023     Lab Results   Component Value Date    TROPONINI <0.01 08/27/2020          Weight:    Wt Readings from Last 3 Encounters:   09/20/23 109.1 kg (240 lb 8.4 oz)       Allergies  Allergies   Allergen Reactions    Hydroxychloroquine Itching    Ampicillin Rash    Naproxen Itching         Surgical History  History reviewed. No pertinent surgical history.    Social History  Tobacco:   History   Smoking Status    Never   Smokeless Tobacco    Not on file    Alcohol:   Social History    Substance and Sexual Activity      Alcohol use: Not Currently   Illicit Drugs:   History   Drug Use Not on file       Family History  No family history on file.       Delfina Rios MD on 9/21/2023      cc: Jerzy Spencer,

## 2023-09-22 ENCOUNTER — APPOINTMENT (OUTPATIENT)
Dept: NUCLEAR MEDICINE | Facility: HOSPITAL | Age: 83
DRG: 871 | End: 2023-09-22
Attending: INTERNAL MEDICINE
Payer: COMMERCIAL

## 2023-09-22 ENCOUNTER — APPOINTMENT (OUTPATIENT)
Dept: MRI IMAGING | Facility: HOSPITAL | Age: 83
DRG: 871 | End: 2023-09-22
Attending: STUDENT IN AN ORGANIZED HEALTH CARE EDUCATION/TRAINING PROGRAM
Payer: COMMERCIAL

## 2023-09-22 ENCOUNTER — APPOINTMENT (OUTPATIENT)
Dept: PHYSICAL THERAPY | Facility: HOSPITAL | Age: 83
DRG: 871 | End: 2023-09-22
Attending: STUDENT IN AN ORGANIZED HEALTH CARE EDUCATION/TRAINING PROGRAM
Payer: COMMERCIAL

## 2023-09-22 ENCOUNTER — APPOINTMENT (OUTPATIENT)
Dept: CARDIOLOGY | Facility: HOSPITAL | Age: 83
DRG: 871 | End: 2023-09-22
Attending: INTERNAL MEDICINE
Payer: COMMERCIAL

## 2023-09-22 LAB
ANION GAP SERPL CALCULATED.3IONS-SCNC: 8 MMOL/L (ref 7–15)
ATRIAL RATE - MUSE: 96 BPM
BACTERIA BLD CULT: ABNORMAL
BACTERIA BLD CULT: ABNORMAL
BUN SERPL-MCNC: 15.4 MG/DL (ref 8–23)
CALCIUM SERPL-MCNC: 8.1 MG/DL (ref 8.8–10.2)
CHLORIDE SERPL-SCNC: 97 MMOL/L (ref 98–107)
CREAT SERPL-MCNC: 0.94 MG/DL (ref 0.51–0.95)
CV STRESS CURRENT BP HE: NORMAL
CV STRESS CURRENT HR HE: 70
CV STRESS CURRENT HR HE: 73
CV STRESS CURRENT HR HE: 73
CV STRESS CURRENT HR HE: 81
CV STRESS CURRENT HR HE: 81
CV STRESS CURRENT HR HE: 83
CV STRESS CURRENT HR HE: 84
CV STRESS CURRENT HR HE: 85
CV STRESS CURRENT HR HE: 85
CV STRESS CURRENT HR HE: 88
CV STRESS DEVIATION TIME HE: NORMAL
CV STRESS ECHO PERCENT HR HE: NORMAL
CV STRESS EXERCISE STAGE HE: NORMAL
CV STRESS FINAL RESTING BP HE: NORMAL
CV STRESS FINAL RESTING HR HE: 81
CV STRESS MAX HR HE: 88
CV STRESS MAX TREADMILL GRADE HE: 0
CV STRESS MAX TREADMILL SPEED HE: 0
CV STRESS PEAK DIA BP HE: NORMAL
CV STRESS PEAK SYS BP HE: NORMAL
CV STRESS PHASE HE: NORMAL
CV STRESS PROTOCOL HE: NORMAL
CV STRESS RESTING PT POSITION HE: NORMAL
CV STRESS ST DEVIATION AMOUNT HE: NORMAL
CV STRESS ST DEVIATION ELEVATION HE: NORMAL
CV STRESS ST EVELATION AMOUNT HE: NORMAL
CV STRESS TEST TYPE HE: NORMAL
CV STRESS TOTAL STAGE TIME MIN 1 HE: NORMAL
DEPRECATED HCO3 PLAS-SCNC: 33 MMOL/L (ref 22–29)
DIASTOLIC BLOOD PRESSURE - MUSE: 54 MMHG
EGFRCR SERPLBLD CKD-EPI 2021: 60 ML/MIN/1.73M2
GLUCOSE SERPL-MCNC: 116 MG/DL (ref 70–99)
INTERPRETATION ECG - MUSE: NORMAL
NUC STRESS EJECTION FRACTION: 70 %
P AXIS - MUSE: 61 DEGREES
PLATELET # BLD AUTO: 202 10E3/UL (ref 150–450)
POTASSIUM SERPL-SCNC: 3 MMOL/L (ref 3.4–5.3)
PR INTERVAL - MUSE: 168 MS
QRS DURATION - MUSE: 76 MS
QT - MUSE: 358 MS
QTC - MUSE: 452 MS
R AXIS - MUSE: 29 DEGREES
RATE PRESSURE PRODUCT: NORMAL
SODIUM SERPL-SCNC: 138 MMOL/L (ref 136–145)
STRESS ECHO BASELINE DIASTOLIC HE: 63
STRESS ECHO BASELINE HR: 73
STRESS ECHO BASELINE SYSTOLIC BP: 135
STRESS ECHO CALCULATED PERCENT HR: 65 %
STRESS ECHO LAST STRESS DIASTOLIC BP: 57
STRESS ECHO LAST STRESS HR: 84
STRESS ECHO LAST STRESS SYSTOLIC BP: 123
STRESS ECHO TARGET HR: 136
SYSTOLIC BLOOD PRESSURE - MUSE: 126 MMHG
T AXIS - MUSE: 48 DEGREES
VENTRICULAR RATE- MUSE: 96 BPM

## 2023-09-22 PROCEDURE — 250N000011 HC RX IP 250 OP 636: Mod: JZ | Performed by: INTERNAL MEDICINE

## 2023-09-22 PROCEDURE — 99233 SBSQ HOSP IP/OBS HIGH 50: CPT | Performed by: INTERNAL MEDICINE

## 2023-09-22 PROCEDURE — 78452 HT MUSCLE IMAGE SPECT MULT: CPT

## 2023-09-22 PROCEDURE — 250N000011 HC RX IP 250 OP 636: Mod: JZ | Performed by: STUDENT IN AN ORGANIZED HEALTH CARE EDUCATION/TRAINING PROGRAM

## 2023-09-22 PROCEDURE — 250N000013 HC RX MED GY IP 250 OP 250 PS 637: Performed by: INTERNAL MEDICINE

## 2023-09-22 PROCEDURE — 93017 CV STRESS TEST TRACING ONLY: CPT | Performed by: INTERNAL MEDICINE

## 2023-09-22 PROCEDURE — 99232 SBSQ HOSP IP/OBS MODERATE 35: CPT | Performed by: STUDENT IN AN ORGANIZED HEALTH CARE EDUCATION/TRAINING PROGRAM

## 2023-09-22 PROCEDURE — 97530 THERAPEUTIC ACTIVITIES: CPT | Mod: GP

## 2023-09-22 PROCEDURE — 93018 CV STRESS TEST I&R ONLY: CPT | Performed by: INTERNAL MEDICINE

## 2023-09-22 PROCEDURE — 97162 PT EVAL MOD COMPLEX 30 MIN: CPT | Mod: GP

## 2023-09-22 PROCEDURE — 250N000013 HC RX MED GY IP 250 OP 250 PS 637: Performed by: HOSPITALIST

## 2023-09-22 PROCEDURE — A9585 GADOBUTROL INJECTION: HCPCS | Mod: JZ | Performed by: STUDENT IN AN ORGANIZED HEALTH CARE EDUCATION/TRAINING PROGRAM

## 2023-09-22 PROCEDURE — 73723 MRI JOINT LWR EXTR W/O&W/DYE: CPT | Mod: LT

## 2023-09-22 PROCEDURE — 80048 BASIC METABOLIC PNL TOTAL CA: CPT | Performed by: STUDENT IN AN ORGANIZED HEALTH CARE EDUCATION/TRAINING PROGRAM

## 2023-09-22 PROCEDURE — 255N000002 HC RX 255 OP 636: Mod: JZ | Performed by: STUDENT IN AN ORGANIZED HEALTH CARE EDUCATION/TRAINING PROGRAM

## 2023-09-22 PROCEDURE — 87040 BLOOD CULTURE FOR BACTERIA: CPT | Performed by: STUDENT IN AN ORGANIZED HEALTH CARE EDUCATION/TRAINING PROGRAM

## 2023-09-22 PROCEDURE — 93016 CV STRESS TEST SUPVJ ONLY: CPT | Performed by: INTERNAL MEDICINE

## 2023-09-22 PROCEDURE — 120N000001 HC R&B MED SURG/OB

## 2023-09-22 PROCEDURE — 343N000001 HC RX 343: Performed by: STUDENT IN AN ORGANIZED HEALTH CARE EDUCATION/TRAINING PROGRAM

## 2023-09-22 PROCEDURE — 93017 CV STRESS TEST TRACING ONLY: CPT

## 2023-09-22 PROCEDURE — 36415 COLL VENOUS BLD VENIPUNCTURE: CPT | Performed by: STUDENT IN AN ORGANIZED HEALTH CARE EDUCATION/TRAINING PROGRAM

## 2023-09-22 PROCEDURE — 250N000013 HC RX MED GY IP 250 OP 250 PS 637: Performed by: STUDENT IN AN ORGANIZED HEALTH CARE EDUCATION/TRAINING PROGRAM

## 2023-09-22 PROCEDURE — 97116 GAIT TRAINING THERAPY: CPT | Mod: GP

## 2023-09-22 PROCEDURE — 85049 AUTOMATED PLATELET COUNT: CPT | Performed by: STUDENT IN AN ORGANIZED HEALTH CARE EDUCATION/TRAINING PROGRAM

## 2023-09-22 PROCEDURE — A9500 TC99M SESTAMIBI: HCPCS | Performed by: STUDENT IN AN ORGANIZED HEALTH CARE EDUCATION/TRAINING PROGRAM

## 2023-09-22 PROCEDURE — 78452 HT MUSCLE IMAGE SPECT MULT: CPT | Mod: 26 | Performed by: INTERNAL MEDICINE

## 2023-09-22 RX ORDER — BUMETANIDE 2 MG/1
2 TABLET ORAL DAILY
Status: DISCONTINUED | OUTPATIENT
Start: 2023-09-22 | End: 2023-09-29 | Stop reason: HOSPADM

## 2023-09-22 RX ORDER — GADOBUTROL 604.72 MG/ML
10 INJECTION INTRAVENOUS ONCE
Status: COMPLETED | OUTPATIENT
Start: 2023-09-22 | End: 2023-09-22

## 2023-09-22 RX ORDER — REGADENOSON 0.08 MG/ML
0.4 INJECTION, SOLUTION INTRAVENOUS ONCE
Status: COMPLETED | OUTPATIENT
Start: 2023-09-22 | End: 2023-09-22

## 2023-09-22 RX ORDER — AMINOPHYLLINE 25 MG/ML
50 INJECTION, SOLUTION INTRAVENOUS
Status: DISCONTINUED | OUTPATIENT
Start: 2023-09-22 | End: 2023-09-22

## 2023-09-22 RX ADMIN — ENOXAPARIN SODIUM 40 MG: 40 INJECTION SUBCUTANEOUS at 09:11

## 2023-09-22 RX ADMIN — MICONAZOLE NITRATE: 20 POWDER TOPICAL at 21:52

## 2023-09-22 RX ADMIN — GADOBUTROL 10 ML: 604.72 INJECTION INTRAVENOUS at 20:40

## 2023-09-22 RX ADMIN — OXYBUTYNIN CHLORIDE 10 MG: 10 TABLET, EXTENDED RELEASE ORAL at 09:11

## 2023-09-22 RX ADMIN — Medication 10.75 MILLICURIE: at 12:56

## 2023-09-22 RX ADMIN — CEFAZOLIN SODIUM 2 G: 2 INJECTION, SOLUTION INTRAVENOUS at 15:03

## 2023-09-22 RX ADMIN — FUROSEMIDE 60 MG: 10 INJECTION, SOLUTION INTRAMUSCULAR; INTRAVENOUS at 00:02

## 2023-09-22 RX ADMIN — Medication 81 MG: at 09:11

## 2023-09-22 RX ADMIN — SPIRONOLACTONE 12.5 MG: 25 TABLET ORAL at 09:11

## 2023-09-22 RX ADMIN — CEFAZOLIN SODIUM 2 G: 2 INJECTION, SOLUTION INTRAVENOUS at 05:36

## 2023-09-22 RX ADMIN — ENOXAPARIN SODIUM 40 MG: 40 INJECTION SUBCUTANEOUS at 21:52

## 2023-09-22 RX ADMIN — REGADENOSON 0.4 MG: 0.08 INJECTION, SOLUTION INTRAVENOUS at 13:53

## 2023-09-22 RX ADMIN — MICONAZOLE NITRATE: 20 POWDER TOPICAL at 01:36

## 2023-09-22 RX ADMIN — LOSARTAN POTASSIUM 25 MG: 25 TABLET, FILM COATED ORAL at 09:12

## 2023-09-22 RX ADMIN — MICONAZOLE NITRATE: 20 POWDER TOPICAL at 09:11

## 2023-09-22 RX ADMIN — PRAVASTATIN SODIUM 20 MG: 20 TABLET ORAL at 09:11

## 2023-09-22 RX ADMIN — LEVOTHYROXINE SODIUM 50 MCG: 0.03 TABLET ORAL at 05:35

## 2023-09-22 RX ADMIN — Medication 35.1 MILLICURIE: at 14:10

## 2023-09-22 RX ADMIN — BUMETANIDE 2 MG: 2 TABLET ORAL at 09:11

## 2023-09-22 RX ADMIN — CEFAZOLIN SODIUM 2 G: 2 INJECTION, SOLUTION INTRAVENOUS at 21:59

## 2023-09-22 ASSESSMENT — ACTIVITIES OF DAILY LIVING (ADL)
ADLS_ACUITY_SCORE: 34
ADLS_ACUITY_SCORE: 30
ADLS_ACUITY_SCORE: 34
ADLS_ACUITY_SCORE: 38
ADLS_ACUITY_SCORE: 34

## 2023-09-22 NOTE — PROGRESS NOTES
[facility] ID Inpatient follow up       Patient:  Makayla Dow  Date of birth 1940, Medical record number [id number]  Date of Visit:  2023  Attending Physician: Shanda Barrow MD         Assessment and Recommendations:   Assessment:  Makayla Dow is a 82 year old female with   Status post bilateral TKA.  Some mild pain bilaterally worse in the left prosthetic knee.  Acute hypoxic respiratory failure.  Resolved.  MSSA bacteremia.  Positive blood culture on 2023, 2 out of 2 sets.  Follow-up blood culture on  in process.  So far no growth.  Source of bacteremia is not clear.  Has superficial excoriations on the left ankle that could be report of entry.  Mild pain in both prosthetic knees worse on the left side.  TTE on 2023 with no vegetation was on IV vancomycin.  Changed to IV Ancef on 2023.  Continues to have pain in the left knee.  Allergy to ampicillin.  Reaction rash.     Recommendations:   Daily blood cultures.  Continue IV Ancef  Left knee MRI  Monitor for rash  RAFAEL early next week    Discussed with the patient, nursing staff.      ID will follow.    Cash Tse MD.  [facility].   [facility]  Office Telephone [phone].  Fax [phone]  [facility] paging            Interval History:     HPI:  The interval history was reviewed.   Feels better.  Chest pain has resolved.  On the schedule for stress test today.  Second set of blood culture now also positive.    Pertinent cultures include:  No results found for: CULT    Recent Inflammatory Biomarkers:   Recent Labs   Lab Test 23  0750 23  0745 23  1715 23  1517 20  1600 18  1437   PCAL  --   --  0.88*  --   --   --    WBC 10.3 13.0*  --  14.2* 6.4 4.8            Review of Systems:   CONSTITUTIONAL:    Temp Max: Temp (24hrs), Av.4  F (36.9  C), Min:98.2  F (36.8  C), Max:98.6  F (37  C)    .  Negative except for findings in the HPI.           Current Medications (antimicrobials listed in bold):      aspirin  81 mg Oral Daily    bumetanide  2 mg Oral Daily    ceFAZolin  2 g Intravenous Q8H    enoxaparin ANTICOAGULANT  40 mg Subcutaneous Q12H    levothyroxine  50 mcg Oral Daily    losartan  25 mg Oral Daily    miconazole   Topical BID    oxyBUTYnin ER  10 mg Oral Daily    pravastatin  20 mg Oral Daily    sodium chloride (PF)  3 mL Intracatheter Q8H    spironolactone  12.5 mg Oral Daily              Allergies:     Allergies   Allergen Reactions    Hydroxychloroquine Itching    Ampicillin Rash    Naproxen Itching            Physical Exam:   Vitals were reviewed  Patient Vitals for the past 24 hrs:   BP Temp Temp src Pulse Resp SpO2   09/22/23 0834 119/57 98.3  F (36.8  C) Oral 69 18 97 %   09/21/23 2310 119/64 98.5  F (36.9  C) Oral 71 18 96 %   09/21/23 1919 121/59 98.6  F (37  C) Oral 59 16 94 %   09/21/23 1530 114/56 98.6  F (37  C) Oral 65 17 93 %   09/21/23 1121 (!) 165/68 98.2  F (36.8  C) Oral 71 18 96 %       Physical Examination:  Gen: Pleasant in no acute distress.  HEENT: NCAT. EOMI. PERRL.  Neck: No bruit, JVD or thyromegaly.  Lungs: Clear to ascultation bilat with no crackles or wheezes.  Card: RRR. NSR. No RMG. Peripheral pulses present and symmetric. No edema.  Abd: Soft NT ND. No mass. Normal bowel sounds.  Skin: No rash.  Extr: Left prosthetic knee pain on palpation.  No clear evidence of infection.  Neuro: Alert and oriented to place time and person.        Laboratory Data:   ID Labs:  Microbiology labs:  Blood Culture Peripheral Blood  Order: [id number]  Collected 9/19/2023  5:15 PM       Status: Final result       Visible to patient: No (inaccessible in MyChart)    Specimen Information: Peripheral Blood   0 Result Notes  Culture Positive on the 1st day of incubation Abnormal       Staphylococcus aureus Panic    1 of 2 bottles        Resulting Agency: [facility]     Susceptibility      Staphylococcus aureus     JENNIFER     Clindamycin 0.25 ug/mL Susceptible     Daptomycin 0.25 ug/mL Susceptible     Doxycycline <=0.5 ug/mL Susceptible     Erythromycin <=0.25 ug/mL Susceptible     Gentamicin <=0.5 ug/mL Susceptible     Oxacillin 0.5 ug/mL Susceptible 1     Tetracycline <=1 ug/mL Susceptible     Trimethoprim/Sulfamethoxazole <=0.5/9.5 u... Susceptible     Vancomycin 1 ug/mL Susceptible              1 Oxacillin susceptible isolates are susceptible to cephalosporins (example: cefazolin and cephalexin) and beta lactam combination agents. Oxacillin resistant isolates are resistant to these agents.            Specimen Collected: 09/19/23  5:15 PM Last Resulted: 09/22/23  7:22 AM           No lab results found.  Recent Labs   Lab Test 09/21/23  0750 09/20/23  0745 09/19/23  1517 08/27/20  1600 08/06/18  1437   WBC 10.3 13.0* 14.2* 6.4 4.8     Recent Labs   Lab Test 09/22/23  0633 09/21/23  0750 09/20/23  0745 09/19/23  1517   CR 0.94 0.73 0.68 0.70   GFRESTIMATED 60* 81 85 85       Hematology Studies  Recent Labs   Lab Test 09/22/23  0633 09/21/23  0750 09/20/23  0745 09/19/23  1517 08/27/20  1600 08/06/18  1437   WBC  --  10.3 13.0* 14.2* 6.4 4.8   HCT  --  36.6 35.7 35.6 43.1 40.7    212 177 193 215 227       Metabolic  Recent Labs   Lab Test 09/22/23  0633 09/21/23  0750 09/20/23  0745    140 142   BUN 15.4 10.3 12.4   CO2 33* 33* 27   CR 0.94 0.73 0.68   GFRESTIMATED 60* 81 85       Hepatic Studies  Recent Labs   Lab Test 08/06/18  1437 02/12/18  1012   BILITOTAL 0.4 0.8   ALKPHOS 105 104   ALBUMIN 3.5 3.4*   AST 24 24   ALT 18 19       ImmunologlobulinsNo lab results found.         Imaging Data:   Reviewed

## 2023-09-22 NOTE — PROGRESS NOTES
Pt denies chest pain, pressure tightness or heaviness.  Denies shortness of breath.  States did come in for soreness in the chest.  States has used inhaler in the past.  Pt came to the hospital hypoxic.  Pt is poor historian.  Manasa Peres RN

## 2023-09-22 NOTE — PROGRESS NOTES
Care Management Follow Up    Length of Stay (days): 3    Expected Discharge Date: 9-22-23     Concerns to be Addressed:     discharge planning  Patient plan of care discussed at interdisciplinary rounds: Yes    Anticipated Discharge Disposition:  home/ home with home care     Anticipated Discharge Services:  Therapy recommending home care PT  Anticipated Discharge DME:      Patient/family educated on Medicare website which has current facility and service quality ratings:    Education Provided on the Discharge Plan:    Patient/Family in Agreement with the Plan:      Referrals Placed by CM/SW:    Private pay costs discussed: Not applicable    Additional Information:  Pt lives with spouse, therapy recommending home with home care, pt has stairs in house. Spouse able to assist. CM following for discharge planning.   ID following.    EUSEBIO Herbert

## 2023-09-22 NOTE — PLAN OF CARE
"  Problem: Plan of Care - These are the overarching goals to be used throughout the patient stay.    Goal: Plan of Care Review  Description: The Plan of Care Review/Shift note should be completed every shift.  The Outcome Evaluation is a brief statement about your assessment that the patient is improving, declining, or no change.  This information will be displayed automatically on your shift note.  Outcome: Progressing  Goal: Patient-Specific Goal (Individualized)  Description: You can add care plan individualizations to a care plan. Examples of Individualization might be:  \"Parent requests to be called daily at 9am for status\", \"I have a hard time hearing out of my right ear\", or \"Do not touch me to wake me up as it startles me\".  Outcome: Progressing  Goal: Absence of Hospital-Acquired Illness or Injury  Outcome: Progressing  Intervention: Identify and Manage Fall Risk  Recent Flowsheet Documentation  Taken 9/22/2023 0000 by Nnamdi Cole RN  Safety Promotion/Fall Prevention: safety round/check completed  Taken 9/21/2023 2100 by Nnamdi Cole RN  Safety Promotion/Fall Prevention: safety round/check completed  Intervention: Prevent Skin Injury  Recent Flowsheet Documentation  Taken 9/22/2023 0000 by Nnamdi Cole RN  Body Position: position changed independently  Taken 9/21/2023 2100 by Nnamdi Cole RN  Body Position: position changed independently  Goal: Optimal Comfort and Wellbeing  Outcome: Progressing  Goal: Readiness for Transition of Care  Outcome: Progressing     Problem: Risk for Delirium  Goal: Optimal Coping  Outcome: Progressing  Goal: Improved Behavioral Control  Outcome: Progressing  Goal: Improved Sleep  Outcome: Progressing     Problem: Infection  Goal: Absence of Infection Signs and Symptoms  Outcome: Progressing   Goal Outcome Evaluation:  Pt alert and oriented x4. Denies pain. Scheduled ancef and Lasix given as per order. Bilateral breast folds rash and redness noted. Hospitalist " notified and Miconazole powder ordered and administered as per order. No other concerns noted overnight. Nursing to continue monitoring.

## 2023-09-22 NOTE — PROGRESS NOTES
[facility]    Medicine Progress Note - Hospitalist Service    Date of Admission:  9/19/2023    Assessment & Plan   Makayla Dow is a 82 year old female admitted on 9/19/2023. She presented to the ER with fever, chest discomfort of 1 day duration.  She was found to have oxygen saturation in the high 80s.  Past medical history significant for HTN, Hypothyroidism, ELIZABETH (non compliant with CPAP)      9/22 :       On 1 liter of oxygen ( not on home oxygen )  Will wean off oxygen    Now on oral bumex 2 mg daily  Creatinine normal, mild metabolic alkalosis  Low K , will supplement  Cardiology ordered stress test, no CP currently    No fevers  On iv ancef for MSSA bacteremia 9/19  Repeat blood cultures 9/21, 9/22 : NGTD  ID Following, plan for MRI left knee and RAFAEL        Not medically ready for discharge at this time.  Antibiotic plan, ID clearance, sepsis work up in progress, need to wean off oxygen  Plan to discharge home with home PT      A/p :     Acute hypoxic respiratory failure secondary to HFpEF  Cardiogenic pulmonary edema 2/2 HFpEF  -Patient presented with oxygen saturation of high 80s, now sat 96% on 5L NC  - CTA Chest: No pulmonary embolism,    Slight pulmonary trunk enlargement; correlate for pulmonary hypertension.   Mild opacities in the lungs, likely atelectasis. Large hiatus hernia  - Echo: Normal left ventricular size and systolic performance with a visually  estimated ejection fraction of 60%. Mild biatrial enlargement  -Monitor daily weights, inputs and outputs, Fluid restriction  -BNP elevated  - Bicarb 33, contraction alkalosis  - am labs, will adjust diuretics accordingly  - Appreciate Cardio eval  - IV lasix, Spironolactone, Losartan  - Wean oxygen as tolerated  - Cardiology following      # Sepsis secondary to Bacteremia  CRP elevated, leukocytosis, Febrile on arrival (meets SIRS criterion)  Bcx: Gram positive cocci in Clusters, Staph aureus, MSSA, susceptibility  "pending  Echo: no vegetations  Repeat Bcx pending  ID eval appreciated  Vancomycin discontinued, On Ancef       Elevated troponin  Chest discomfort, resolved  Initial troponin was elevated at 36 followed by 37  Downtrended to 29  No acute ST segment changes on EKG      Pancreatic lesion vs mass  -exophytic density off the distal pancreatic tail; indeterminate for exophytic pancreatic parenchyma versus lesion. Recommend dedicated CT pancreas imaging   - CT Pancreas: no pancreatic mass       Hypothyroidism  TSH wnl  -PTA levothyroxine    Hyperlipidemia  - Statin    Severe Obesity  BMI: 44.74    Imaging finding  -Large hiatal hernia - follow up with surgery as outpatient  -7mm right lung base nodule - Follow up with PMD/ Pulm outpatient    -- PT eval pending       Diet: NPO for Medical/Clinical Reasons Except for: Meds, Ice Chips    DVT Prophylaxis: Enoxaparin (Lovenox) SQ  Wise Catheter: Not present  Lines: None     Cardiac Monitoring: None  Code Status: Full Code      Clinically Significant Risk Factors        # Hypokalemia: Lowest K = 3 mmol/L in last 2 days, will replace as needed                  # Severe Obesity: Estimated body mass index is 43.99 kg/m  as calculated from the following:    Height as of this encounter: 1.575 m (5' 2\").    Weight as of this encounter: 109.1 kg (240 lb 8.4 oz).  , PRESENT ON ADMISSION            Disposition Plan     Expected Discharge Date: 09/22/2023      Destination: home with family  Discharge Comments: cultures/IV antibiotics          Jr Mendieta MD  Hospitalist Service  [facility]  Securely message with [employer] (more info)  Text page via "[employer] Paging/Directory   ______________________________________________________________________        Physical Exam   Vital Signs: Temp: 98.3  F (36.8  C) Temp src: Oral BP: 119/57 Pulse: 69   Resp: 18 SpO2: 97 % O2 Device: Nasal cannula Oxygen Delivery: 1 LPM  Weight: 240 lbs 8.35 oz    General Appearance:  No " distress noted  Respiratory: Diminished air entry bilaterally basilarly, + mild crackles bilaterally  Cardiovascular: S1 and S2 well heard, no murmur or gallop  GI: Soft abdomen, no tenderness, normoactive bowel sounds  Skin: Chronic venous stasis skin changes on bilateral legs, + edema b/l improving       Medical Decision Making       35 MINUTES SPENT BY ME on the date of service doing chart review, history, exam, documentation & further activities per the note.      Data     I have personally reviewed the following data over the past 24 hrs:    N/A  \   N/A   / 202     138 97 (L) 15.4 /  116 (H)   3.0 (L) 33 (H) 0.94 \       Imaging results reviewed over the past 24 hrs:   No results found for this or any previous visit (from the past 24 hour(s)).

## 2023-09-22 NOTE — PROGRESS NOTES
09/22/23 0900   Appointment Info   Signing Clinician's Name / Credentials (PT) Jelly Lind DPT   Living Environment   People in Home spouse   Current Living Arrangements house   Home Accessibility stairs to enter home;stairs within home   Number of Stairs, Main Entrance 4   Stair Railings, Main Entrance railings safe and in good condition   Number of Stairs, Within Home, Primary greater than 10 stairs  (laundry in basement- spouse does laundry)   Stair Railings, Within Home, Primary railings safe and in good condition   Transportation Anticipated family or friend will provide   Self-Care   Usual Activity Tolerance good   Current Activity Tolerance fair   Equipment Currently Used at Home cane, straight;walker, rolling  (FWW and 4WW)   General Information   Onset of Illness/Injury or Date of Surgery 09/19/23   Referring Physician Shanda Barrow MD   Patient/Family Therapy Goals Statement (PT) none   Pertinent History of Current Problem (include personal factors and/or comorbidities that impact the POC) 82 year old female admitted on 9/19/2023. She presented to the ER with fever, chest discomfort of 1 day duration.  She was found to have oxygen saturation in the high 80s.  Past medical history significant for HTN, Hypothyroidism, ELIZABETH   Existing Precautions/Restrictions oxygen therapy device and L/min  (1L)   Strength (Manual Muscle Testing)   Strength (Manual Muscle Testing) Deficits observed during functional mobility   Bed Mobility   Bed Mobility supine-sit   Supine-Sit [place] (Bed Mobility) supervision;verbal cues   Assistive Device (Bed Mobility) bed rails   Transfers   Transfers sit-stand transfer   Sit-Stand Transfer   Sit-Stand [place] (Transfers) contact guard   Assistive Device (Sit-Stand Transfers) walker, front-wheeled   Gait/Stairs (Locomotion)   [place] Level (Gait) contact guard   Assistive Device (Gait) walker, front-wheeled   Distance in Feet 10'   Distance in Feet (Gait) 75',  10'   Pattern (Gait) step-through   Deviations/Abnormal Patterns (Gait) gait speed decreased   Clinical Impression   Criteria for Skilled Therapeutic Intervention Yes, treatment indicated   PT Diagnosis (PT) Impaired functional mobility   Influenced by the following impairments Weakness, fatigue   Functional limitations due to impairments Impaired strength, transfers, gait   Clinical Presentation (PT Evaluation Complexity) Stable/Uncomplicated   Clinical Presentation Rationale Presents as diagnosed   Clinical Decision Making (Complexity) moderate complexity   Planned Therapy Interventions (PT) balance training;bed mobility training;gait training;home exercise program;stair training;strengthening;transfer training   Anticipated Equipment Needs at Discharge (PT) walker, rolling   Risk & Benefits of therapy have been explained patient   PT Total Evaluation Time   PT Eval, Moderate Complexity Minutes (76801) 10   Physical Therapy Goals   PT Frequency Daily   PT Predicted Duration/Target Date for Goal Attainment 09/29/23   PT Goals Bed Mobility;Transfers;Gait;Stairs   PT: Bed Mobility Independent;Supine to/from sit   PT: Transfers Modified independent;Sit to/from stand;Bed to/from chair;Assistive device   PT: Gait Supervision/stand-by assist;Rolling walker;100 feet   PT: Stairs Minimal assist;4 stairs   Interventions   Interventions Quick Adds Gait Training;Therapeutic Activity   Therapeutic Activity   Therapeutic Activities: dynamic activities to improve functional performance Minutes (70463) 10   Symptoms Noted During/After Treatment Fatigue   Treatment Detail/Skilled Intervention Pt performed toilet transfer with CGA. Max A to cipriano/janina new brief. Pt able to perform her own lili-cares. Pt stood at sink to perform grooming and hygiene tasks with CGA. Pt seated in recliner at end of session, alarm engaged.   Gait Training   Gait Training Minutes (03600) 10   Symptoms Noted During/After Treatment (Gait Training) fatigue    Treatment Detail/Skilled Intervention Slow, shuffling gait. Cues for upright posture and PLB. Limited by fatigue.   [place] Level (Gait Training) contact guard   Physical Assistance Level (Gait Training) verbal cues;1 person assist   Weight Bearing (Gait Training) weight-bearing as tolerated   Assistive Device (Gait Training) rolling walker   Gait Analysis Deviations decreased vikas   Impairments (Gait Analysis/Training) balance impaired;strength decreased   PT Discharge Planning   PT Plan progress transfers, amb as madie, 4 DAVID, HEP   PT Discharge Recommendation (DC Rec) home with assist;home with home care physical therapy   PT Rationale for DC Rec Pending progress with stairs, pt should be able to d/c home with assist from spouse. Recommend home PT to improve overall strength and balance.   PT Brief overview of current status Amb 95' with FWW and CGA.   Total Session Time   Timed Code Treatment Minutes 20   Total Session Time (sum of timed and untimed services) 30     Jelly Lind, PT, DPT  9/22/2023

## 2023-09-22 NOTE — PLAN OF CARE
Goal Outcome Evaluation:    Alert and oriented. Npo today for a stress test.  No complaints of any pain today. Ambulated with assist in the halls today.  To have a MRI and RAFAEL also.

## 2023-09-22 NOTE — PROGRESS NOTES
Nuclear stress completed as ordered.  Normal vasodilation sx.  Images and interpretation pending.  Manasa Peres RN

## 2023-09-22 NOTE — PROGRESS NOTES
RAFAEL planned for Monday, 9/25/23 around 800am.  Please keep pt NPO after midnight on Sunday.  May take morning meds with sip of water, but hold diuretics morning of RAFAEL.  Check patency of IV Monday am as she will be receiving IV sedation for her RAFAEL.      Thank you!    Dori Dietz RN  Non-invasive heart care  [phone]

## 2023-09-22 NOTE — PROGRESS NOTES
HEART CARE NOTE          Assessment/Recommendations   1. HFpEF c/b severe ADHF  Assessment / Plan  Nearing euvolemia; transition to oral diuretic regimen; continue to monitor UOP, renal function closely  Patient is high risk of adverse cardiac events 2/2 advanced age  GDMT as detailed below; mainstay of treatment for HFpEF includes diuretics and adequate BP control (class I) and SGLT2-I (class 2a); additional medical therapy (ARNI, MRA, ARB) demonstrated less robust evidence for indication but may be considered per guideline recommendations (2b); no indication for BBlockers      Current Pharmacotherapy AHA Guideline-Directed Medical Therapy   Losartan  25 mg daily ARNI/ARB   Spironolactone 12.5 mg  MRA   SGLT2 inhibitor: not started SGLT2-I    Bumex 2 mg dailt Loop diuretic       2. Acute hypoxic respiratory failure  Assessment / Plan  Likely 2/2 cardiogenic pulmonary edema in the setting of ADHF - diuresis as above     3. Mildly elevated high sensitivity troponin  Assessment / Plan  Mild elevation which is already trending down in the setting of ADHF - likely demand ischemia  Echo without WMAs or interval changes in cardiac structure or function  Patient currently chest pain free     4. HTN  Assessment / Plan  Inadequately controlled - add losartan as above     Plan of care discussed on September 22, 2023 with patient at bedside, and primary team overseeing patient's care      History of Present Illness/Subjective    Ms. Makayla Dow is a 82 year old female with a PMHx significant for (per Dr. Martinez' note) hypertension and hypothyroidism who presents in ADHF.     Today, Mrs. Jenna Dow denies any acute cardiac events or complaints; Management plan as detailed above     ECG: Personally reviewed. normal EKG, normal sinus rhythm, unchanged from previous tracings.     ECHO (personnaly Reviewed on 9/21/23):   1. Normal left ventricular size and systolic performance with a visually  estimated  "ejection fraction of 60%.  2. No significant valvular heart disease is identified on this study.  3. Normal right ventricular size and systolic performance.  4. There is mild biatrial enlargement.     Telemetry: personally reviewed September 22, 2023; notable for NSR     Lab results: personally reviewed September 22, 2023; notable for hypokalemia    Medical history and pertinent documents reviewed in Care Everywhere please where applicable see details above        Physical Examination Review of Systems   /64 (BP Location: Right arm)   Pulse 71   Temp 98.5  F (36.9  C) (Oral)   Resp 18   Ht 1.575 m (5' 2\")   Wt 109.1 kg (240 lb 8.4 oz)   SpO2 96%   BMI 43.99 kg/m    Body mass index is 43.99 kg/m .  Wt Readings from Last 3 Encounters:   09/20/23 109.1 kg (240 lb 8.4 oz)     General Appearance:   no distress, normal body habitus   ENT/Mouth: membranes moist, no oral lesions or bleeding gums.      EYES:  no scleral icterus, normal conjunctivae   Neck: no carotid bruits or thyromegaly   Chest/Lungs:   lungs are clear to auscultation, no rales or wheezing, equal chest wall expansion    Cardiovascular:   Regular. Normal first and second heart sounds with no murmurs, rubs, or gallops; the carotid, radial and posterior tibial pulses are intact, no JVD and trace to mild LE edema bilaterally    Abdomen:  no organomegaly, masses, bruits, or tenderness; bowel sounds are present   Extremities: no cyanosis or clubbing   Skin: no xanthelasma, warm.    Neurologic: NAD     Psychiatric: alert and oriented x3, calm     A complete 10 systems ROS was reviewed  And is negative except what is listed in the HPI.          Medical History  Surgical History Family History Social History   History reviewed. No pertinent past medical history. History reviewed. No pertinent surgical history. no family history of premature coronary artery disease Social History     Socioeconomic History    Marital status:      Spouse name: Not " on file    Number of children: Not on file    Years of education: Not on file    Highest education level: Not on file   Occupational History    Not on file   Tobacco Use    Smoking status: Never    Smokeless tobacco: Not on file   Substance and Sexual Activity    Alcohol use: Not Currently    Drug use: Not on file    Sexual activity: Not on file   Other Topics Concern    Not on file   Social History Narrative    Not on file     Social Determinants of Health     Financial Resource Strain: Not on file   Food Insecurity: Not on file   Transportation Needs: Not on file   Physical Activity: Not on file   Stress: Not on file   Social Connections: Not on file   Interpersonal Safety: Not on file   Housing Stability: Not on file           Lab Results    Chemistry/lipid CBC Cardiac Enzymes/BNP/TSH/INR   Lab Results   Component Value Date    CHOL 212 (H) 04/07/2023    HDL 94 04/07/2023    TRIG 97 04/07/2023    BUN 10.3 09/21/2023     09/21/2023    CO2 33 (H) 09/21/2023    Lab Results   Component Value Date    WBC 10.3 09/21/2023    HGB 11.5 (L) 09/21/2023    HCT 36.6 09/21/2023    MCV 94 09/21/2023     09/21/2023    Lab Results   Component Value Date    TROPONINI <0.01 08/27/2020     02/10/2022    TSH 2.98 09/20/2023     Lab Results   Component Value Date    TROPONINI <0.01 08/27/2020          Weight:    Wt Readings from Last 3 Encounters:   09/20/23 109.1 kg (240 lb 8.4 oz)       Allergies  Allergies   Allergen Reactions    Hydroxychloroquine Itching    Ampicillin Rash    Naproxen Itching         Surgical History  History reviewed. No pertinent surgical history.    Social History  Tobacco:   History   Smoking Status    Never   Smokeless Tobacco    Not on file    Alcohol:   Social History    Substance and Sexual Activity      Alcohol use: Not Currently   Illicit Drugs:   History   Drug Use Not on file       Family History  No family history on file.       Delfina Rios MD on 9/22/2023      cc:  Jerzy Spencer

## 2023-09-23 ENCOUNTER — APPOINTMENT (OUTPATIENT)
Dept: PHYSICAL THERAPY | Facility: HOSPITAL | Age: 83
DRG: 871 | End: 2023-09-23
Payer: COMMERCIAL

## 2023-09-23 LAB
ANION GAP SERPL CALCULATED.3IONS-SCNC: 12 MMOL/L (ref 7–15)
BACTERIA BLD CULT: ABNORMAL
BACTERIA BLD CULT: ABNORMAL
BUN SERPL-MCNC: 19.1 MG/DL (ref 8–23)
CALCIUM SERPL-MCNC: 8.8 MG/DL (ref 8.8–10.2)
CHLORIDE SERPL-SCNC: 96 MMOL/L (ref 98–107)
CREAT SERPL-MCNC: 0.87 MG/DL (ref 0.51–0.95)
CREAT SERPL-MCNC: 0.88 MG/DL (ref 0.51–0.95)
DEPRECATED HCO3 PLAS-SCNC: 31 MMOL/L (ref 22–29)
EGFRCR SERPLBLD CKD-EPI 2021: 64 ML/MIN/1.73M2
EGFRCR SERPLBLD CKD-EPI 2021: 65 ML/MIN/1.73M2
GLUCOSE SERPL-MCNC: 106 MG/DL (ref 70–99)
POTASSIUM SERPL-SCNC: 3.5 MMOL/L (ref 3.4–5.3)
SODIUM SERPL-SCNC: 139 MMOL/L (ref 136–145)

## 2023-09-23 PROCEDURE — 250N000011 HC RX IP 250 OP 636: Mod: JZ | Performed by: STUDENT IN AN ORGANIZED HEALTH CARE EDUCATION/TRAINING PROGRAM

## 2023-09-23 PROCEDURE — 97110 THERAPEUTIC EXERCISES: CPT | Mod: GP

## 2023-09-23 PROCEDURE — 999N000127 HC STATISTIC PERIPHERAL IV START W US GUIDANCE

## 2023-09-23 PROCEDURE — 80048 BASIC METABOLIC PNL TOTAL CA: CPT | Performed by: INTERNAL MEDICINE

## 2023-09-23 PROCEDURE — 99233 SBSQ HOSP IP/OBS HIGH 50: CPT | Performed by: INTERNAL MEDICINE

## 2023-09-23 PROCEDURE — 120N000001 HC R&B MED SURG/OB

## 2023-09-23 PROCEDURE — 99232 SBSQ HOSP IP/OBS MODERATE 35: CPT | Performed by: INTERNAL MEDICINE

## 2023-09-23 PROCEDURE — 97116 GAIT TRAINING THERAPY: CPT | Mod: GP

## 2023-09-23 PROCEDURE — 82565 ASSAY OF CREATININE: CPT | Performed by: STUDENT IN AN ORGANIZED HEALTH CARE EDUCATION/TRAINING PROGRAM

## 2023-09-23 PROCEDURE — 250N000013 HC RX MED GY IP 250 OP 250 PS 637: Performed by: STUDENT IN AN ORGANIZED HEALTH CARE EDUCATION/TRAINING PROGRAM

## 2023-09-23 PROCEDURE — 80048 BASIC METABOLIC PNL TOTAL CA: CPT | Performed by: STUDENT IN AN ORGANIZED HEALTH CARE EDUCATION/TRAINING PROGRAM

## 2023-09-23 PROCEDURE — 36415 COLL VENOUS BLD VENIPUNCTURE: CPT | Performed by: STUDENT IN AN ORGANIZED HEALTH CARE EDUCATION/TRAINING PROGRAM

## 2023-09-23 PROCEDURE — 250N000013 HC RX MED GY IP 250 OP 250 PS 637: Performed by: INTERNAL MEDICINE

## 2023-09-23 PROCEDURE — 87040 BLOOD CULTURE FOR BACTERIA: CPT | Performed by: STUDENT IN AN ORGANIZED HEALTH CARE EDUCATION/TRAINING PROGRAM

## 2023-09-23 RX ADMIN — OXYBUTYNIN CHLORIDE 10 MG: 10 TABLET, EXTENDED RELEASE ORAL at 08:55

## 2023-09-23 RX ADMIN — ENOXAPARIN SODIUM 40 MG: 40 INJECTION SUBCUTANEOUS at 08:55

## 2023-09-23 RX ADMIN — CEFAZOLIN SODIUM 2 G: 2 INJECTION, SOLUTION INTRAVENOUS at 13:40

## 2023-09-23 RX ADMIN — PRAVASTATIN SODIUM 20 MG: 20 TABLET ORAL at 08:55

## 2023-09-23 RX ADMIN — BUMETANIDE 2 MG: 2 TABLET ORAL at 08:56

## 2023-09-23 RX ADMIN — MICONAZOLE NITRATE: 20 POWDER TOPICAL at 21:17

## 2023-09-23 RX ADMIN — LOSARTAN POTASSIUM 25 MG: 25 TABLET, FILM COATED ORAL at 08:56

## 2023-09-23 RX ADMIN — CEFAZOLIN SODIUM 2 G: 2 INJECTION, SOLUTION INTRAVENOUS at 21:17

## 2023-09-23 RX ADMIN — CEFAZOLIN SODIUM 2 G: 2 INJECTION, SOLUTION INTRAVENOUS at 06:03

## 2023-09-23 RX ADMIN — LEVOTHYROXINE SODIUM 50 MCG: 0.03 TABLET ORAL at 06:26

## 2023-09-23 RX ADMIN — MICONAZOLE NITRATE: 20 POWDER TOPICAL at 08:55

## 2023-09-23 RX ADMIN — SPIRONOLACTONE 12.5 MG: 25 TABLET ORAL at 08:56

## 2023-09-23 RX ADMIN — Medication 81 MG: at 08:56

## 2023-09-23 RX ADMIN — ENOXAPARIN SODIUM 40 MG: 40 INJECTION SUBCUTANEOUS at 21:17

## 2023-09-23 ASSESSMENT — ACTIVITIES OF DAILY LIVING (ADL)
ADLS_ACUITY_SCORE: 31
ADLS_ACUITY_SCORE: 30
ADLS_ACUITY_SCORE: 31
ADLS_ACUITY_SCORE: 30
ADLS_ACUITY_SCORE: 30
ADLS_ACUITY_SCORE: 31

## 2023-09-23 NOTE — PROGRESS NOTES
[facility] Inpatient follow up       Patient:  Makayla Dow  Date of birth 1940, Medical record number [id number]  Date of Visit:  2023  Attending Physician: Shanda Barrow MD         Assessment and Recommendations:   Assessment:  Makayla Dow is a 82 year old female with   Status post bilateral TKA.  Some mild pain bilaterally worse in the left prosthetic knee.  Acute hypoxic respiratory failure.  Resolved.  MSSA bacteremia.  Positive blood culture on 2023, 2 out of 2 sets.  Follow-up blood culture on  in process.  So far no growth.  Source of bacteremia is not clear.  Has superficial excoriations on the left ankle that could be report of entry.  Mild pain in both prosthetic knees worse on the left side.  TTE on 2023 with no vegetation was on IV vancomycin.  Changed to IV Ancef on 2023.  Continues to have pain in the left knee. MRI negative for sign of infection at knee.   Allergy to ampicillin.  Reaction rash.     Recommendations:   Daily blood cultures can stop  Continue IV Ancef  Monitor for rash  RAFAEL early next week    Please call if questions over the weekend. We will check in .     Hilario Rubio MD   [facility].   [facility]  Office Telephone [phone].  Fax [phone]  [facility] paging            Interval History:     HPI:  up in chair. Has some pain in shoulder blades, attributes to bed/chair/positioning. Knees stable. Reviewed results.         Recent Inflammatory Biomarkers:   Recent Labs   Lab Test 23  0750 23  0745 23  1715 23  1517 20  1600 18  1437   PCAL  --   --  0.88*  --   --   --    WBC 10.3 13.0*  --  14.2* 6.4 4.8            Review of Systems:   CONSTITUTIONAL:    Temp Max: Temp (24hrs), Av.4  F (36.9  C), Min:98.2  F (36.8  C), Max:98.6  F (37  C)   .  Negative except for findings in the HPI.           Current Medications  (antimicrobials listed in bold):      aspirin  81 mg Oral Daily    bumetanide  2 mg Oral Daily    ceFAZolin  2 g Intravenous Q8H    enoxaparin ANTICOAGULANT  40 mg Subcutaneous Q12H    levothyroxine  50 mcg Oral Daily    losartan  25 mg Oral Daily    miconazole   Topical BID    oxyBUTYnin ER  10 mg Oral Daily    pravastatin  20 mg Oral Daily    sodium chloride (PF)  3 mL Intracatheter Q8H    spironolactone  12.5 mg Oral Daily              Allergies:     Allergies   Allergen Reactions    Hydroxychloroquine Itching    Ampicillin Rash    Naproxen Itching            Physical Exam:   Vitals were reviewed  Patient Vitals for the past 24 hrs:   BP Temp Temp src Pulse Resp SpO2   09/23/23 0755 (!) 141/62 97.9  F (36.6  C) Oral 75 18 91 %   09/22/23 2338 104/55 98.5  F (36.9  C) Oral 74 18 91 %   09/22/23 1526 131/72 98.7  F (37.1  C) Oral 83 18 93 %       Physical Examination:  Gen: Pleasant in no acute distress.  HEENT: NCAT. EOMI. PERRL.  Neck: No bruit, JVD or thyromegaly.  Lungs: Clear to ascultation bilat with no crackles or wheezes.  Card: RRR. NSR. No RMG. Peripheral pulses present and symmetric. No edema.  Abd: Soft NT ND. No mass. Normal bowel sounds.  Skin: No rash.  Extr: Left prosthetic knee pain on palpation.  No clear evidence of infection.  Neuro: Alert and oriented to place time and person.        Laboratory Data:   ID Labs:  Microbiology labs:  Blood clearing 9/21, 22, 23  Blood Culture Peripheral Blood  Order: [id number]  Collected 9/19/2023  5:15 PM       Status: Final result       Visible to patient: No (inaccessible in MyChart)    Specimen Information: Peripheral Blood   0 Result Notes  Culture Positive on the 1st day of incubation Abnormal       Staphylococcus aureus Panic    1 of 2 bottles        Resulting Agency: [facility]     Susceptibility     Staphylococcus aureus     JENNIFER     Clindamycin 0.25 ug/mL Susceptible     Daptomycin 0.25 ug/mL Susceptible     Doxycycline <=0.5 ug/mL Susceptible     Erythromycin  <=0.25 ug/mL Susceptible     Gentamicin <=0.5 ug/mL Susceptible     Oxacillin 0.5 ug/mL Susceptible 1     Tetracycline <=1 ug/mL Susceptible     Trimethoprim/Sulfamethoxazole <=0.5/9.5 u... Susceptible     Vancomycin 1 ug/mL Susceptible              1 Oxacillin susceptible isolates are susceptible to cephalosporins (example: cefazolin and cephalexin) and beta lactam combination agents. Oxacillin resistant isolates are resistant to these agents.            Specimen Collected: 09/19/23  5:15 PM Last Resulted: 09/22/23  7:22 AM           No lab results found.  Recent Labs   Lab Test 09/21/23  0750 09/20/23  0745 09/19/23  1517 08/27/20  1600 08/06/18  1437   WBC 10.3 13.0* 14.2* 6.4 4.8     Recent Labs   Lab Test 09/23/23  0650 09/22/23  0633 09/21/23  0750 09/20/23  0745   CR 0.88  0.87 0.94 0.73 0.68   GFRESTIMATED 64  65 60* 81 85       Hematology Studies  Recent Labs   Lab Test 09/22/23  0633 09/21/23  0750 09/20/23  0745 09/19/23  1517 08/27/20  1600 08/06/18  1437   WBC  --  10.3 13.0* 14.2* 6.4 4.8   HCT  --  36.6 35.7 35.6 43.1 40.7    212 177 193 215 227       Metabolic  Recent Labs   Lab Test 09/23/23  0650 09/22/23  0633 09/21/23  0750    138 140   BUN 19.1 15.4 10.3   CO2 31* 33* 33*   CR 0.88  0.87 0.94 0.73   GFRESTIMATED 64  65 60* 81       Hepatic Studies  Recent Labs   Lab Test 08/06/18  1437 02/12/18  1012   BILITOTAL 0.4 0.8   ALKPHOS 105 104   ALBUMIN 3.5 3.4*   AST 24 24   ALT 18 19       ImmunologlobulinsNo lab results found.         Imaging Data:   Reviewed

## 2023-09-23 NOTE — PLAN OF CARE
Goal Outcome Evaluation:       VSS. Pt denies pain or discomfort. Assist of one to ambulate to bathroom and voiding well. Left knee MRI done this jessika. Pt is aware of Schedule RAFAEL on Monday. Cont with plan of care.

## 2023-09-23 NOTE — PROGRESS NOTES
[facility]    Medicine Progress Note - Hospitalist Service    Date of Admission:  9/19/2023    Assessment & Plan   aMkayla Dow is a 82 year old female admitted on 9/19/2023. She presented to the ER with fever, chest discomfort of 1 day duration.  She was found to have oxygen saturation in the high 80s.  Past medical history significant for HTN, Hypothyroidism, ELIZABETH (non compliant with CPAP)      9/22 :       On 1 liter of oxygen ( not on home oxygen )  Will wean off oxygen    Now on oral bumex 2 mg daily  Creatinine normal, mild metabolic alkalosis  Low K , will supplement  Cardiology ordered stress test, no CP currently    No fevers  On iv ancef for MSSA bacteremia 9/19  Repeat blood cultures 9/21, 9/22 : NGTD  ID Following, plan for MRI left knee and RAFAEL        Not medically ready for discharge at this time.  Antibiotic plan, ID clearance, sepsis work up in progress, need to wean off oxygen  Plan to discharge home with home PT              9/23 :       Off oxygen now    on oral bumex 2 mg daily  Creatinine normal, mild metabolic alkalosis  normal K , will supplement  Cardiology ordered stress test - negative, no CP currently    No fevers  On iv ancef for MSSA bacteremia 9/19  Repeat blood cultures 9/21, 9/22 : NGTD  ID Following,  MRI left knee : post op changes,  RAFAEL : negative        Not medically ready for discharge at this time.  Antibiotic plan, ID clearance, RAFAEL on monday  Plan to discharge home with home PT      A/p :     Acute hypoxic respiratory failure secondary to HFpEF  Cardiogenic pulmonary edema 2/2 HFpEF  -Patient presented with oxygen saturation of high 80s, now sat 96% on 5L NC  - CTA Chest: No pulmonary embolism,    Slight pulmonary trunk enlargement; correlate for pulmonary hypertension.   Mild opacities in the lungs, likely atelectasis. Large hiatus hernia  - Echo: Normal left ventricular size and systolic performance with a visually  estimated ejection fraction  "of 60%. Mild biatrial enlargement  -Monitor daily weights, inputs and outputs, Fluid restriction  -BNP elevated  - Bicarb 33, contraction alkalosis  - am labs, will adjust diuretics accordingly  - Appreciate Cardio eval  - IV lasix, Spironolactone, Losartan  - Wean oxygen as tolerated  - Cardiology following      # Sepsis secondary to Bacteremia  CRP elevated, leukocytosis, Febrile on arrival (meets SIRS criterion)  Bcx: Gram positive cocci in Clusters, Staph aureus, MSSA, susceptibility pending  Echo: no vegetations  Repeat Bcx pending  ID eval appreciated  Vancomycin discontinued, On Ancef       Elevated troponin  Chest discomfort, resolved  Initial troponin was elevated at 36 followed by 37  Downtrended to 29  No acute ST segment changes on EKG      Pancreatic lesion vs mass  -exophytic density off the distal pancreatic tail; indeterminate for exophytic pancreatic parenchyma versus lesion. Recommend dedicated CT pancreas imaging   - CT Pancreas: no pancreatic mass       Hypothyroidism  TSH wnl  -PTA levothyroxine    Hyperlipidemia  - Statin    Severe Obesity  BMI: 44.74    Imaging finding  -Large hiatal hernia - follow up with surgery as outpatient  -7mm right lung base nodule - Follow up with PMD/ Pulm outpatient    -- PT eval pending       Diet: Regular Diet Adult    DVT Prophylaxis: Enoxaparin (Lovenox) SQ  Wise Catheter: Not present  Lines: None     Cardiac Monitoring: None  Code Status: Full Code      Clinically Significant Risk Factors        # Hypokalemia: Lowest K = 3 mmol/L in last 2 days, will replace as needed                  # Severe Obesity: Estimated body mass index is 43.99 kg/m  as calculated from the following:    Height as of this encounter: 1.575 m (5' 2\").    Weight as of this encounter: 109.1 kg (240 lb 8.4 oz).  , PRESENT ON ADMISSION            Disposition Plan     Expected Discharge Date: 09/25/2023    Discharge Delays: IV Medication - consider oral or Home Infusion  Destination: home " with family  Discharge Comments: cultures/IV antibiotics  ID following  daily blood cutlures  ?RAFAEL next week          Jr Mendieta MD  Hospitalist Service  [facility]  Securely message with [employer] (more info)  Text page via [employer] Paging/Directory   ______________________________________________________________________        Physical Exam   Vital Signs: Temp: 98.3  F (36.8  C) Temp src: Oral BP: (!) 141/63 Pulse: 75   Resp: 18 SpO2: 91 % O2 Device: None (Room air)    Weight: 240 lbs 8.35 oz    General Appearance:  No distress noted  Respiratory: Diminished air entry bilaterally basilarly, + mild crackles bilaterally  Cardiovascular: S1 and S2 well heard, no murmur or gallop  GI: Soft abdomen, no tenderness, normoactive bowel sounds  Skin: Chronic venous stasis skin changes on bilateral legs, + edema b/l improving       Medical Decision Making       35 MINUTES SPENT BY ME on the date of service doing chart review, history, exam, documentation & further activities per the note.      Data     I have personally reviewed the following data over the past 24 hrs:    N/A  \   N/A   / N/A     139 96 (L) 19.1 /  106 (H)   3.5 31 (H) 0.87; 0.88 \       Imaging results reviewed over the past 24 hrs:   Recent Results (from the past 24 hour(s))   MR Knee Left w/o & w Contrast    Narrative    EXAM: MR KNEE LEFT W/O and W CONTRAST  LOCATION: [facility]  DATE: 9/22/2023    INDICATION: Left prosthetic knee pain in a patient with Staph aureus bacteremia. Look for evidence of prosthetic septic arthritis.  COMPARISON: None.  TECHNIQUE: Metal artifact reduction protocol. 10 mL Gadavist.    FINDINGS:  MEDIAL COMPARTMENT:   -Extensive metallic susceptibility artifact from total knee arthroplasty. No evidence for reactive osteolysis or loosening.    LATERAL COMPARTMENT:   -Extensive metallic susceptibility artifact from total knee arthroplasty. No evidence for reactive osteolysis or  loosening.    PATELLOFEMORAL COMPARTMENT:   -Postoperative changes patellar resurfacing. No evidence for loosening.     LIGAMENTS:   -Obscured by artifact.    POSTEROMEDIAL CORNER:   -Distal semimembranosus tendon and pes anserine tendons are intact.    POSTEROLATERAL CORNER:   -Popliteal tendon and biceps femoris tendon are intact.    EXTENSOR MECHANISM:   -Quadriceps tendon: Tendinopathy. No tearing.  -Patellar tendon: Tendinopathy. No tearing.  -Patellofemoral ligaments and retinacula: Intact.    JOINT:  -No significant effusion. No evidence for septic arthropathy.    BONES:  -No fracture or bone marrow signal abnormality.    SOFT TISSUES:   -No periprosthetic fluid collection. No acute muscular injury or soft tissue mass.       Impression    IMPRESSION:  1.  Postoperative changes of total knee arthroplasty and patellar resurfacing. No evidence for reactive osteolysis or loosening. No evidence for osteomyelitis.  2.  No significant effusion to suggest septic arthropathy.  3.  Quadriceps and patellar tendinopathy without tearing.  4.  Exam otherwise negative.

## 2023-09-23 NOTE — PROGRESS NOTES
Care Management Follow Up     Length of Stay (days): 4     Expected Discharge Date: 9-25-23     Concerns to be Addressed:     discharge planning  Patient plan of care discussed at interdisciplinary rounds: Yes     Anticipated Discharge Disposition:  home/ home with home care     Anticipated Discharge Services:  Therapy recommending home care PT  Anticipated Discharge DME:       Patient/family educated on Medicare website which has current facility and service quality ratings:  n/a  Education Provided on the Discharge Plan:    Patient/Family in Agreement with the Plan:       Referrals Placed by CM/SW:  home care  Private pay costs discussed: Not applicable     Additional Information:  Pt lives with spouse, therapy recommending home with home care, pt has stairs in house. Spouse able to assist. Referral made to [facility] for home PT. Patient accepted for care with [facility]    CM following for discharge planning.   ID following.

## 2023-09-23 NOTE — PLAN OF CARE
Goal Outcome Evaluation:    Alert and oriented.  Up with assist.  Up to the chair and ambulated halls.  No complaints of pain.  Tolerating diet. Lungs clear. No shortness of breath. On RA.  Still on iv antibiotics.

## 2023-09-23 NOTE — PLAN OF CARE
"Goal Outcome Evaluation:      Plan of Care Reviewed With: patient    Overall Patient Progress: improvingOverall Patient Progress: improving    Outcome Evaluation: Denies SOB and chest pain. Assist x1. VSS. Pt sleeping between cares.    /55 (BP Location: Left arm)   Pulse 74   Temp 98.5  F (36.9  C) (Oral)   Resp 18   Ht 1.575 m (5' 2\")   Wt 109.1 kg (240 lb 8.4 oz)   SpO2 91%   BMI 43.99 kg/m      Efrem Leon RN    "

## 2023-09-23 NOTE — PROGRESS NOTES
"[facility]  Cardiac Electrophysiology  [address]  [place] [address]   Office: [phone]  Fax: [phone]     Cardiology Progress Note    Patient: Makayla Dow   : 1940       Assessment/Recommendations     Acute on chronic diastolic heart failure - LVEF 60% by 2023 TTE.  Appears euvolemic.  - continue bumetanide 2mg orally daily  - continue spironolactone 12.5mg daily    Elevated hs-Adam - likely demand ischemia    HTN  - continue losartan, spironolactone, bumetanide         Interval Events   I/O not recorded.    She feels well, notes resolved dyspnea.       Physical Examination  Review of Systems   VITALS: /55 (BP Location: Left arm)   Pulse 74   Temp 98.5  F (36.9  C) (Oral)   Resp 18   Ht 1.575 m (5' 2\")   Wt 109.1 kg (240 lb 8.4 oz)   SpO2 91%   BMI 43.99 kg/m    Wt Readings from Last 3 Encounters:   23 109.1 kg (240 lb 8.4 oz)     No intake or output data in the 24 hours ending 23 0656  CONSTITUTIONAL: no distress  EYES:  Conjunctivae pink, sclerae clear.    E/N/T:  Oral mucosa pink, moist mucous membranes  RESPIRATORY:  Respiratory effort is normal  CARDIOVASCULAR:  normal S1 and S2  GASTROINTESTINAL:  Abdomen without masses or tenderness  EXTREMITIES:  No clubbing or cyanosis.    MUSCULOSKELETAL:  Overall grossly normal muscle strength  SKIN:  Overall, skin warm and dry, no lesions.  NEURO/PSYCH:  Oriented x 3 with normal affect.   Constitutional:  No weight loss or loss of appetite    Cardiovascular: As detailed above  Respiratory: No cough  Genitourinary: No trouble urinating           Medical History  Surgical History   History reviewed. No pertinent past medical history. History reviewed. No pertinent surgical history.      Family History Social History   No family history on file.     Social History     Tobacco Use    Smoking status: Never   Substance Use Topics    Alcohol use: Not Currently         Medications "  Allergies     Current Facility-Administered Medications:     acetaminophen (TYLENOL) tablet 650 mg, 650 mg, Oral, Q6H PRN, 650 mg at 09/21/23 1324 **OR** acetaminophen (TYLENOL) Suppository 650 mg, 650 mg, Rectal, Q6H PRN, Tyesha Martinez MD    aspirin EC tablet 81 mg, 81 mg, Oral, Daily, Tyesha Martinez MD, 81 mg at 09/22/23 0911    bumetanide (BUMEX) tablet 2 mg, 2 mg, Oral, Daily, Delfina Rios MD, 2 mg at 09/22/23 0911    ceFAZolin (ANCEF) 2 g in 100 mL D5W intermittent infusion, 2 g, Intravenous, Q8H, Cash Tse MD, Last Rate: 200 mL/hr at 09/23/23 0603, 2 g at 09/23/23 0603    enoxaparin ANTICOAGULANT (LOVENOX) injection 40 mg, 40 mg, Subcutaneous, Q12H, Tyesha Martinez MD, 40 mg at 09/22/23 2152    levothyroxine (SYNTHROID/LEVOTHROID) tablet 50 mcg, 50 mcg, Oral, Daily, Tyesha Martinez MD, 50 mcg at 09/23/23 0626    lidocaine (LMX4) cream, , Topical, Q1H PRN, Tyesha Martinez MD    lidocaine 1 % 0.1-1 mL, 0.1-1 mL, Other, Q1H PRN, yTesha Martinez MD    losartan (COZAAR) tablet 25 mg, 25 mg, Oral, Daily, Delfina Rios MD, 25 mg at 09/22/23 0912    melatonin tablet 1 mg, 1 mg, Oral, At Bedtime PRN, Tyesha Martinez MD    miconazole (MICATIN) 2 % powder, , Topical, BID, Chito Mascorro MD, Given at 09/22/23 2152    ondansetron (ZOFRAN ODT) ODT tab 4 mg, 4 mg, Oral, Q6H PRN **OR** ondansetron (ZOFRAN) injection 4 mg, 4 mg, Intravenous, Q6H PRN, Tyesha Martinez MD    oxyBUTYnin ER (DITROPAN XL) 24 hr tablet 10 mg, 10 mg, Oral, Daily, Tyesha Martinez MD, 10 mg at 09/22/23 0911    polyethylene glycol (MIRALAX) Packet 17 g, 17 g, Oral, Daily PRN, Tyesha Martinez MD, 17 g at 09/20/23 2001    pravastatin (PRAVACHOL) tablet 20 mg, 20 mg, Oral, Daily, Tyesha Martinez MD, 20 mg at 09/22/23 0911    prochlorperazine (COMPAZINE) injection 5 mg, 5 mg, Intravenous, Q6H PRN **OR** prochlorperazine (COMPAZINE)  tablet 5 mg, 5 mg, Oral, Q6H PRN **OR** prochlorperazine (COMPAZINE) suppository 12.5 mg, 12.5 mg, Rectal, Q12H PRN, Tyesha Martinez MD    senna-docusate (SENOKOT-S/PERICOLACE) 8.6-50 MG per tablet 2 tablet, 2 tablet, Oral, BID PRN, Shanda Barrow MD    sodium chloride (PF) 0.9% PF flush 3 mL, 3 mL, Intracatheter, Q8H, Tyesha Martinez MD, 3 mL at 09/23/23 0603    sodium chloride (PF) 0.9% PF flush 3 mL, 3 mL, Intracatheter, q1 min prn, Tyesha Martinez MD    spironolactone (ALDACTONE) half-tab 12.5 mg, 12.5 mg, Oral, Daily, Delfina Rios MD, 12.5 mg at 09/22/23 0911     Allergies   Allergen Reactions    Hydroxychloroquine Itching    Ampicillin Rash    Naproxen Itching          Lab Results    Chemistry CBC Cardiac Enzymes/BNP/TSH/INR   Recent Labs   Lab Test 09/22/23  0633      POTASSIUM 3.0*   CHLORIDE 97*   CO2 33*   *   BUN 15.4   CR 0.94   GFRESTIMATED 60*   ALVARO 8.1*     Recent Labs   Lab Test 09/22/23  0633 09/21/23  0750 09/20/23  0745   CR 0.94 0.73 0.68          Recent Labs   Lab Test 09/22/23  0633 09/21/23  0750   WBC  --  10.3   HGB  --  11.5*   HCT  --  36.6   MCV  --  94    212     Recent Labs   Lab Test 09/21/23  0750 09/20/23  0745 09/19/23  1517   HGB 11.5* 11.1* 11.3*    Recent Labs   Lab Test 08/27/20  1600   TROPONINI <0.01     Recent Labs   Lab Test 09/20/23  0745 09/19/23  1517 02/10/22  1257 04/27/21  1535   BNP  --   --  117 121   NTBNPI 2,193* 975  --   --      Recent Labs   Lab Test 09/20/23  0745   TSH 2.98     No results for input(s): INR in the last 70270 hours.         Erick Osorio MD  9/23/2023  6:56 AM

## 2023-09-24 PROCEDURE — 120N000001 HC R&B MED SURG/OB

## 2023-09-24 PROCEDURE — 250N000011 HC RX IP 250 OP 636: Mod: JZ | Performed by: STUDENT IN AN ORGANIZED HEALTH CARE EDUCATION/TRAINING PROGRAM

## 2023-09-24 PROCEDURE — 99232 SBSQ HOSP IP/OBS MODERATE 35: CPT | Performed by: INTERNAL MEDICINE

## 2023-09-24 PROCEDURE — 250N000013 HC RX MED GY IP 250 OP 250 PS 637: Performed by: INTERNAL MEDICINE

## 2023-09-24 PROCEDURE — 250N000013 HC RX MED GY IP 250 OP 250 PS 637: Performed by: STUDENT IN AN ORGANIZED HEALTH CARE EDUCATION/TRAINING PROGRAM

## 2023-09-24 RX ADMIN — CEFAZOLIN SODIUM 2 G: 2 INJECTION, SOLUTION INTRAVENOUS at 05:32

## 2023-09-24 RX ADMIN — ENOXAPARIN SODIUM 40 MG: 40 INJECTION SUBCUTANEOUS at 21:31

## 2023-09-24 RX ADMIN — BUMETANIDE 2 MG: 2 TABLET ORAL at 09:46

## 2023-09-24 RX ADMIN — ACETAMINOPHEN 650 MG: 325 TABLET, FILM COATED ORAL at 23:28

## 2023-09-24 RX ADMIN — LOSARTAN POTASSIUM 25 MG: 25 TABLET, FILM COATED ORAL at 09:46

## 2023-09-24 RX ADMIN — PRAVASTATIN SODIUM 20 MG: 20 TABLET ORAL at 09:46

## 2023-09-24 RX ADMIN — SPIRONOLACTONE 12.5 MG: 25 TABLET ORAL at 09:46

## 2023-09-24 RX ADMIN — MICONAZOLE NITRATE: 20 POWDER TOPICAL at 09:46

## 2023-09-24 RX ADMIN — ACETAMINOPHEN 650 MG: 325 TABLET, FILM COATED ORAL at 06:18

## 2023-09-24 RX ADMIN — MICONAZOLE NITRATE: 20 POWDER TOPICAL at 21:31

## 2023-09-24 RX ADMIN — OXYBUTYNIN CHLORIDE 10 MG: 10 TABLET, EXTENDED RELEASE ORAL at 09:46

## 2023-09-24 RX ADMIN — Medication 81 MG: at 09:46

## 2023-09-24 RX ADMIN — LEVOTHYROXINE SODIUM 50 MCG: 0.03 TABLET ORAL at 05:35

## 2023-09-24 RX ADMIN — CEFAZOLIN SODIUM 2 G: 2 INJECTION, SOLUTION INTRAVENOUS at 14:42

## 2023-09-24 RX ADMIN — CEFAZOLIN SODIUM 2 G: 2 INJECTION, SOLUTION INTRAVENOUS at 21:31

## 2023-09-24 RX ADMIN — ENOXAPARIN SODIUM 40 MG: 40 INJECTION SUBCUTANEOUS at 09:46

## 2023-09-24 ASSESSMENT — ACTIVITIES OF DAILY LIVING (ADL)
ADLS_ACUITY_SCORE: 30
ADLS_ACUITY_SCORE: 32
ADLS_ACUITY_SCORE: 30
ADLS_ACUITY_SCORE: 31
ADLS_ACUITY_SCORE: 30
ADLS_ACUITY_SCORE: 32
ADLS_ACUITY_SCORE: 30

## 2023-09-24 NOTE — PROGRESS NOTES
Chart reviewed. Plan to return home with [facility] for home PT when medically ready .

## 2023-09-24 NOTE — PLAN OF CARE
Problem: Pain Acute  Goal: Optimal Pain Control and Function  Outcome: Progressing  Intervention: Prevent or Manage Pain  Recent Flowsheet Documentation  Taken 9/23/2023 7696 by Chris Souza RN  Medication Review/Management: medications reviewed     Problem: Infection  Goal: Absence of Infection Signs and Symptoms  Outcome: Progressing   Goal Outcome Evaluation:    Patient remains afebrile. Given iv Ancef. Up to the bathroom with A-1 to void. Complained of neck pain rated 5/10, given Tylenol 650 mg at 0618.

## 2023-09-24 NOTE — PLAN OF CARE
Goal Outcome Evaluation:    Alert and oriented. Up to chair today. Ambulated halls. Dyspnea on exertion.  No chest pain.  Tolerating diet. Lungs are clear and on RA.  Plan is npo after MN and RAFAEL in the am.

## 2023-09-24 NOTE — PROGRESS NOTES
"[facility]  Cardiac Electrophysiology  [address]  [place] [address]   Office: [phone]  Fax: [phone]     Cardiology Progress Note    Patient: Makayla Dow   : 1940       Assessment/Recommendations     Acute on chronic diastolic heart failure - LVEF 60% by 2023 TTE.  Appears euvolemic.  - continue bumetanide 2mg orally daily - goal I/O even  - continue spironolactone 12.5mg daily     Elevated hs-Adam - likely demand ischemia     HTN  - continue losartan, spironolactone, bumetanide    Cardiology will sign off - please contact us with further questions.         Interval Events   She feels well, notes resolved dyspnea.        Physical Examination  Review of Systems   VITALS: /58 (BP Location: Left arm)   Pulse 73   Temp 98.9  F (37.2  C) (Oral)   Resp 18   Ht 1.575 m (5' 2\")   Wt 109.1 kg (240 lb 8.4 oz)   SpO2 91%   BMI 43.99 kg/m    Wt Readings from Last 3 Encounters:   23 109.1 kg (240 lb 8.4 oz)       Intake/Output Summary (Last 24 hours) at 2023 0659  Last data filed at 2023 0536  Gross per 24 hour   Intake 1200 ml   Output --   Net 1200 ml     CONSTITUTIONAL: no distress  EYES:  Conjunctivae pink, sclerae clear.    E/N/T:  Oral mucosa pink, moist mucous membranes  RESPIRATORY:  Respiratory effort is normal  CARDIOVASCULAR:  normal S1 and S2  GASTROINTESTINAL:  Abdomen without masses or tenderness  EXTREMITIES:  No clubbing or cyanosis.    MUSCULOSKELETAL:  Overall grossly normal muscle strength  SKIN:  Overall, skin warm and dry, no lesions.  NEURO/PSYCH:  Oriented x 3 with normal affect.   Constitutional:  No weight loss or loss of appetite    Cardiovascular: As detailed above  Respiratory: No cough  Genitourinary: No trouble urinating           Medical History  Surgical History   History reviewed. No pertinent past medical history. History reviewed. No pertinent surgical history.      Family History Social " History   No family history on file.     Social History     Tobacco Use    Smoking status: Never   Substance Use Topics    Alcohol use: Not Currently         Medications  Allergies     Current Facility-Administered Medications:     acetaminophen (TYLENOL) tablet 650 mg, 650 mg, Oral, Q6H PRN, 650 mg at 09/24/23 0618 **OR** acetaminophen (TYLENOL) Suppository 650 mg, 650 mg, Rectal, Q6H PRN, Tyesha Martinez MD    aspirin EC tablet 81 mg, 81 mg, Oral, Daily, Tyesha Martinez MD, 81 mg at 09/23/23 0856    bumetanide (BUMEX) tablet 2 mg, 2 mg, Oral, Daily, Delfina Rios MD, 2 mg at 09/23/23 0856    ceFAZolin (ANCEF) 2 g in 100 mL D5W intermittent infusion, 2 g, Intravenous, Q8H, Cash Tse MD, Last Rate: 200 mL/hr at 09/24/23 0532, 2 g at 09/24/23 0532    enoxaparin ANTICOAGULANT (LOVENOX) injection 40 mg, 40 mg, Subcutaneous, Q12H, Tyesha Martinez MD, 40 mg at 09/23/23 2117    levothyroxine (SYNTHROID/LEVOTHROID) tablet 50 mcg, 50 mcg, Oral, Daily, Tyesha Martinez MD, 50 mcg at 09/24/23 0535    lidocaine (LMX4) cream, , Topical, Q1H PRN, Tyesha Martinez MD    lidocaine 1 % 0.1-1 mL, 0.1-1 mL, Other, Q1H PRN, Tyesha Martinez MD    losartan (COZAAR) tablet 25 mg, 25 mg, Oral, Daily, Delfina Rios MD, 25 mg at 09/23/23 0856    melatonin tablet 1 mg, 1 mg, Oral, At Bedtime PRN, Tyesha Martinez MD    miconazole (MICATIN) 2 % powder, , Topical, BID, Chito Mascorro MD, Given at 09/23/23 2117    ondansetron (ZOFRAN ODT) ODT tab 4 mg, 4 mg, Oral, Q6H PRN **OR** ondansetron (ZOFRAN) injection 4 mg, 4 mg, Intravenous, Q6H PRN, Tyesha Martinez MD    oxyBUTYnin ER (DITROPAN XL) 24 hr tablet 10 mg, 10 mg, Oral, Daily, Tyesha Martinez MD, 10 mg at 09/23/23 0855    polyethylene glycol (MIRALAX) Packet 17 g, 17 g, Oral, Daily PRN, Tyesha Martinez MD, 17 g at 09/20/23 2001    pravastatin (PRAVACHOL) tablet 20 mg, 20 mg, Oral,  Daily, Tyesha Martinez MD, 20 mg at 09/23/23 0855    prochlorperazine (COMPAZINE) injection 5 mg, 5 mg, Intravenous, Q6H PRN **OR** prochlorperazine (COMPAZINE) tablet 5 mg, 5 mg, Oral, Q6H PRN **OR** prochlorperazine (COMPAZINE) suppository 12.5 mg, 12.5 mg, Rectal, Q12H PRN, Tyesha Martinez MD    senna-docusate (SENOKOT-S/PERICOLACE) 8.6-50 MG per tablet 2 tablet, 2 tablet, Oral, BID PRN, Shanda Barrow MD    sodium chloride (PF) 0.9% PF flush 3 mL, 3 mL, Intracatheter, Q8H, Tyesha Martinez MD, 3 mL at 09/24/23 0532    sodium chloride (PF) 0.9% PF flush 3 mL, 3 mL, Intracatheter, q1 min prn, Tyesha Martinez MD    spironolactone (ALDACTONE) half-tab 12.5 mg, 12.5 mg, Oral, Daily, Delfina Rios MD, 12.5 mg at 09/23/23 0856     Allergies   Allergen Reactions    Hydroxychloroquine Itching    Ampicillin Rash    Naproxen Itching          Lab Results    Chemistry CBC Cardiac Enzymes/BNP/TSH/INR   Recent Labs   Lab Test 09/23/23  0650      POTASSIUM 3.5   CHLORIDE 96*   CO2 31*   *   BUN 19.1   CR 0.88  0.87   GFRESTIMATED 64  65   ALVARO 8.8     Recent Labs   Lab Test 09/23/23  0650 09/22/23  0633 09/21/23  0750   CR 0.88  0.87 0.94 0.73          Recent Labs   Lab Test 09/22/23  0633 09/21/23  0750   WBC  --  10.3   HGB  --  11.5*   HCT  --  36.6   MCV  --  94    212     Recent Labs   Lab Test 09/21/23  0750 09/20/23  0745 09/19/23  1517   HGB 11.5* 11.1* 11.3*    Recent Labs   Lab Test 08/27/20  1600   TROPONINI <0.01     Recent Labs   Lab Test 09/20/23  0745 09/19/23  1517 02/10/22  1257 04/27/21  1535   BNP  --   --  117 121   NTBNPI 2,193* 975  --   --      Recent Labs   Lab Test 09/20/23  0745   TSH 2.98     No results for input(s): INR in the last 34150 hours.         Erick Osorio MD  9/24/2023  6:59 AM

## 2023-09-25 ENCOUNTER — HOME INFUSION (PRE-WILLOW HOME INFUSION) (OUTPATIENT)
Dept: PHARMACY | Facility: CLINIC | Age: 83
End: 2023-09-25

## 2023-09-25 ENCOUNTER — APPOINTMENT (OUTPATIENT)
Dept: MRI IMAGING | Facility: HOSPITAL | Age: 83
DRG: 871 | End: 2023-09-25
Attending: STUDENT IN AN ORGANIZED HEALTH CARE EDUCATION/TRAINING PROGRAM
Payer: COMMERCIAL

## 2023-09-25 ENCOUNTER — TELEPHONE (OUTPATIENT)
Dept: CARDIOLOGY | Facility: CLINIC | Age: 83
End: 2023-09-25

## 2023-09-25 ENCOUNTER — APPOINTMENT (OUTPATIENT)
Dept: CARDIOLOGY | Facility: HOSPITAL | Age: 83
DRG: 871 | End: 2023-09-25
Attending: STUDENT IN AN ORGANIZED HEALTH CARE EDUCATION/TRAINING PROGRAM
Payer: COMMERCIAL

## 2023-09-25 DIAGNOSIS — I50.9 ACUTE DECOMPENSATED HEART FAILURE (H): Primary | ICD-10-CM

## 2023-09-25 LAB
CRP SERPL-MCNC: 118.2 MG/L
HOLD SPECIMEN: NORMAL
PLATELET # BLD AUTO: 235 10E3/UL (ref 150–450)

## 2023-09-25 PROCEDURE — 99152 MOD SED SAME PHYS/QHP 5/>YRS: CPT | Performed by: INTERNAL MEDICINE

## 2023-09-25 PROCEDURE — 86140 C-REACTIVE PROTEIN: CPT | Performed by: STUDENT IN AN ORGANIZED HEALTH CARE EDUCATION/TRAINING PROGRAM

## 2023-09-25 PROCEDURE — 93325 DOPPLER ECHO COLOR FLOW MAPG: CPT | Mod: 26 | Performed by: INTERNAL MEDICINE

## 2023-09-25 PROCEDURE — 250N000013 HC RX MED GY IP 250 OP 250 PS 637: Performed by: INTERNAL MEDICINE

## 2023-09-25 PROCEDURE — 93312 ECHO TRANSESOPHAGEAL: CPT

## 2023-09-25 PROCEDURE — 93320 DOPPLER ECHO COMPLETE: CPT | Mod: 26 | Performed by: INTERNAL MEDICINE

## 2023-09-25 PROCEDURE — 99232 SBSQ HOSP IP/OBS MODERATE 35: CPT | Performed by: INTERNAL MEDICINE

## 2023-09-25 PROCEDURE — 93325 DOPPLER ECHO COLOR FLOW MAPG: CPT

## 2023-09-25 PROCEDURE — 99232 SBSQ HOSP IP/OBS MODERATE 35: CPT | Performed by: STUDENT IN AN ORGANIZED HEALTH CARE EDUCATION/TRAINING PROGRAM

## 2023-09-25 PROCEDURE — 36415 COLL VENOUS BLD VENIPUNCTURE: CPT | Performed by: STUDENT IN AN ORGANIZED HEALTH CARE EDUCATION/TRAINING PROGRAM

## 2023-09-25 PROCEDURE — A9585 GADOBUTROL INJECTION: HCPCS | Mod: JZ | Performed by: STUDENT IN AN ORGANIZED HEALTH CARE EDUCATION/TRAINING PROGRAM

## 2023-09-25 PROCEDURE — 255N000002 HC RX 255 OP 636: Mod: JZ | Performed by: STUDENT IN AN ORGANIZED HEALTH CARE EDUCATION/TRAINING PROGRAM

## 2023-09-25 PROCEDURE — 120N000001 HC R&B MED SURG/OB

## 2023-09-25 PROCEDURE — 250N000013 HC RX MED GY IP 250 OP 250 PS 637: Performed by: STUDENT IN AN ORGANIZED HEALTH CARE EDUCATION/TRAINING PROGRAM

## 2023-09-25 PROCEDURE — 93312 ECHO TRANSESOPHAGEAL: CPT | Mod: 26 | Performed by: INTERNAL MEDICINE

## 2023-09-25 PROCEDURE — 250N000011 HC RX IP 250 OP 636: Performed by: INTERNAL MEDICINE

## 2023-09-25 PROCEDURE — 250N000011 HC RX IP 250 OP 636: Mod: JZ | Performed by: STUDENT IN AN ORGANIZED HEALTH CARE EDUCATION/TRAINING PROGRAM

## 2023-09-25 PROCEDURE — 258N000003 HC RX IP 258 OP 636: Performed by: STUDENT IN AN ORGANIZED HEALTH CARE EDUCATION/TRAINING PROGRAM

## 2023-09-25 PROCEDURE — 72156 MRI NECK SPINE W/O & W/DYE: CPT

## 2023-09-25 PROCEDURE — 85049 AUTOMATED PLATELET COUNT: CPT | Performed by: STUDENT IN AN ORGANIZED HEALTH CARE EDUCATION/TRAINING PROGRAM

## 2023-09-25 PROCEDURE — 250N000009 HC RX 250: Performed by: INTERNAL MEDICINE

## 2023-09-25 RX ORDER — GADOBUTROL 604.72 MG/ML
10 INJECTION INTRAVENOUS ONCE
Status: COMPLETED | OUTPATIENT
Start: 2023-09-25 | End: 2023-09-25

## 2023-09-25 RX ORDER — LIDOCAINE HYDROCHLORIDE 20 MG/ML
SOLUTION OROPHARYNGEAL
Status: COMPLETED | OUTPATIENT
Start: 2023-09-25 | End: 2023-09-25

## 2023-09-25 RX ORDER — NALOXONE HYDROCHLORIDE 0.4 MG/ML
0.2 INJECTION, SOLUTION INTRAMUSCULAR; INTRAVENOUS; SUBCUTANEOUS
Status: DISCONTINUED | OUTPATIENT
Start: 2023-09-25 | End: 2023-09-29 | Stop reason: HOSPADM

## 2023-09-25 RX ORDER — NALOXONE HYDROCHLORIDE 0.4 MG/ML
0.4 INJECTION, SOLUTION INTRAMUSCULAR; INTRAVENOUS; SUBCUTANEOUS
Status: DISCONTINUED | OUTPATIENT
Start: 2023-09-25 | End: 2023-09-29 | Stop reason: HOSPADM

## 2023-09-25 RX ORDER — FENTANYL CITRATE 50 UG/ML
INJECTION, SOLUTION INTRAMUSCULAR; INTRAVENOUS
Status: COMPLETED | OUTPATIENT
Start: 2023-09-25 | End: 2023-09-25

## 2023-09-25 RX ORDER — SODIUM CHLORIDE 9 MG/ML
INJECTION, SOLUTION INTRAVENOUS CONTINUOUS
Status: DISCONTINUED | OUTPATIENT
Start: 2023-09-25 | End: 2023-09-26

## 2023-09-25 RX ADMIN — SPIRONOLACTONE 12.5 MG: 25 TABLET ORAL at 12:57

## 2023-09-25 RX ADMIN — LEVOTHYROXINE SODIUM 50 MCG: 0.03 TABLET ORAL at 05:35

## 2023-09-25 RX ADMIN — CEFAZOLIN SODIUM 2 G: 2 INJECTION, SOLUTION INTRAVENOUS at 05:35

## 2023-09-25 RX ADMIN — LOSARTAN POTASSIUM 25 MG: 25 TABLET, FILM COATED ORAL at 10:19

## 2023-09-25 RX ADMIN — ENOXAPARIN SODIUM 40 MG: 40 INJECTION SUBCUTANEOUS at 21:42

## 2023-09-25 RX ADMIN — FENTANYL CITRATE 50 MCG: 50 INJECTION, SOLUTION INTRAMUSCULAR; INTRAVENOUS at 09:06

## 2023-09-25 RX ADMIN — SODIUM CHLORIDE: 9 INJECTION, SOLUTION INTRAVENOUS at 08:49

## 2023-09-25 RX ADMIN — SODIUM CHLORIDE: 9 INJECTION, SOLUTION INTRAVENOUS at 22:00

## 2023-09-25 RX ADMIN — BUMETANIDE 2 MG: 2 TABLET ORAL at 10:19

## 2023-09-25 RX ADMIN — Medication 81 MG: at 10:19

## 2023-09-25 RX ADMIN — OXYCODONE HYDROCHLORIDE 2.5 MG: 5 TABLET ORAL at 18:39

## 2023-09-25 RX ADMIN — LIDOCAINE HYDROCHLORIDE 15 ML: 20 SOLUTION ORAL; TOPICAL at 09:02

## 2023-09-25 RX ADMIN — TOPICAL ANESTHETIC 0.5 ML: 200 SPRAY DENTAL; PERIODONTAL at 09:03

## 2023-09-25 RX ADMIN — PRAVASTATIN SODIUM 20 MG: 20 TABLET ORAL at 10:19

## 2023-09-25 RX ADMIN — CEFAZOLIN SODIUM 2 G: 2 INJECTION, SOLUTION INTRAVENOUS at 21:42

## 2023-09-25 RX ADMIN — MIDAZOLAM 2 MG: 1 INJECTION INTRAMUSCULAR; INTRAVENOUS at 09:04

## 2023-09-25 RX ADMIN — MICONAZOLE NITRATE: 20 POWDER TOPICAL at 21:41

## 2023-09-25 RX ADMIN — ACETAMINOPHEN 650 MG: 325 TABLET, FILM COATED ORAL at 16:40

## 2023-09-25 RX ADMIN — CEFAZOLIN SODIUM 2 G: 2 INJECTION, SOLUTION INTRAVENOUS at 13:04

## 2023-09-25 RX ADMIN — ENOXAPARIN SODIUM 40 MG: 40 INJECTION SUBCUTANEOUS at 10:19

## 2023-09-25 RX ADMIN — MICONAZOLE NITRATE: 20 POWDER TOPICAL at 10:20

## 2023-09-25 RX ADMIN — GADOBUTROL 10 ML: 604.72 INJECTION INTRAVENOUS at 21:10

## 2023-09-25 RX ADMIN — OXYBUTYNIN CHLORIDE 10 MG: 10 TABLET, EXTENDED RELEASE ORAL at 10:19

## 2023-09-25 ASSESSMENT — ACTIVITIES OF DAILY LIVING (ADL)
ADLS_ACUITY_SCORE: 42
ADLS_ACUITY_SCORE: 43
ADLS_ACUITY_SCORE: 30
ADLS_ACUITY_SCORE: 30
ADLS_ACUITY_SCORE: 32
ADLS_ACUITY_SCORE: 42
ADLS_ACUITY_SCORE: 42
ADLS_ACUITY_SCORE: 30
ADLS_ACUITY_SCORE: 30
ADLS_ACUITY_SCORE: 42

## 2023-09-25 NOTE — PROGRESS NOTES
Transesophageal Echocardiogram    Patient tolerated procedure well. VSS. Pt received Versed 2mg IV and Fentanyl 50mcg IV for procedure. NPO until 1015 and no hot foods/liquids til 1515. Talked to patient's nurse Jennifer to update. Pt transferred back to room via  at 0950.    Results pending cardiology interpretation.    Lisa Palafox RN

## 2023-09-25 NOTE — PROGRESS NOTES
"[facility]: Heart Failure Care Coordination   Heart Failure Education    Situation/Background:      RN CC provided heart failure education to Pt during admission.    Assessment:      Living situation: home, independent    Barriers to Heart Failure follow-up: none     Medication management: independent      Intervention/Plan:      CM/HF education topics reviewed:  Low sodium: 2000 mg or less daily, meal choices and label reading   Fluid Restriction: 50-60pz daily   Daily weight monitoring and logging   Medication review and importance of compliance   Overview of C.O.R.E. clinic   Importance of exercise   Symptoms of HF to be reported to Core Team      Education materials provided:  Low sodium food and drink handout  Low sodium food product examples  Already prepared low salt meal delivery services handout  HF stoplight tool  Guide to HF booklet  C.O.R.E. information brochure     HF resources reviewed:  [facility]  HRS      Patient to follow up as scheduled.     Patient expressed understanding of above education/instructions and denied further questions at this time.    Pt states she thinks they need help at home now. Reports her  is becoming forgetful but \"he doesn't like other people in the house.\" Advised her to talk to social work. She states she loves salt. We discussed the need to limit her sodium to 2000mg daily and fluids 50-60oz daily. She sometimes cooks at home and other times they do takeout. Encouraged Pt to avoid takeout/eating out and to continue with home cooked meals using no salt alternatives. We also discussed [facility] if cooking becomes too much. Lastly, we discussed HRS but Pt is unsure at this time.    Eric SANTOSOEmelyRJOSE ALFREDO. JIGNA  BSN    "

## 2023-09-25 NOTE — PROGRESS NOTES
[facility]    Referral received from ARMIN Patten, for IV antibiotics.    Benefits verified. Pt does not have coverage for IV antibiotics under her [employer] Medicare plan.  Patient would be self-pay.  The drug would be billed to the Part D and the supplies would be self-pay.  Based on Cefazolin 2g q 8 hours, the total cost is $31.74/day for drug and supplies.    For nursing, patient should have coverage if homebound.  [facility] the [place] would need to secure an agency.  Patient should have coverage in a TCU or an Infusion Center.    Should pt decide to do home infusion therapy, writer will speak with pt to review benefits, home infusion and to offer choice of agency/home infusion provider.    Thank you for the referral.    Teresa Flood RN, BSN  [facility] Liaison  [phone] (Mon through Fri, 8:00 am-5:00 pm)  [phone] (Office)

## 2023-09-25 NOTE — PRE-PROCEDURE
GENERAL PRE-PROCEDURE:   Date/Time:  9/25/2023 9:02 AM    Written consent obtained?: Yes    Risks and benefits: Risks, benefits and alternatives were discussed    Consent given by:  Patient  Patient states understanding of procedure being performed: Yes    Patient's understanding of procedure matches consent: Yes    Procedure consent matches procedure scheduled: Yes    Expected level of sedation:  Moderate  Appropriately NPO:  Yes  ASA Class:  3  Mallampati  :  Grade 4- soft palate obscured by base of tongue  Lungs:  Lungs clear with good breath sounds bilaterally  Heart:  Normal heart sounds and rate  History & Physical reviewed:  History and physical reviewed and no updates needed  Statement of review:  I have reviewed the lab findings, diagnostic data, medications, and the plan for sedation

## 2023-09-25 NOTE — PROGRESS NOTES
Pt does not have coverage for iv abx in the home with their [employer] Medicare plan. Pt would be self-pay. Drug would be billed to the part D and supplies will be self-pay.  Based on Cefazolin 2g q8h, total cost is $31.74 for drug and supplies per day.     For nursing, patient should have coverage if homebound, however [facility] is not contracted with Medicare and an outside nursing agency would be utilized instead. If patient is not homebound, there is no coverage and I can see patient if patient agrees to self-pay for $90 per visit.    Patient should have coverage in a TCU or infusion center.    ([facility]) In reference to admission date 09/19/2023 to check for iv abx coverage.     Please contact Intake with any questions, [phone] or In Basket pool,  Home Infusion ([phone]).

## 2023-09-25 NOTE — PLAN OF CARE
Problem: Plan of Care - These are the overarching goals to be used throughout the patient stay.    Goal: Optimal Comfort and Wellbeing  Outcome: Progressing   Goal Outcome Evaluation:    Patient alert and oriented, denies any chest pain. NPO since midnight with plan for RAFAEL this morning. Voiding without issues, IV to SL. Will continue to monitor.

## 2023-09-25 NOTE — PROGRESS NOTES
[facility]    Medicine Progress Note - Hospitalist Service    Date of Admission:  9/19/2023    Assessment & Plan   Makayla Dow is a 82 year old female admitted on 9/19/2023. She presented to the ER with fever, chest discomfort of 1 day duration.  She was found to have oxygen saturation in the high 80s.  Past medical history significant for HTN, Hypothyroidism, ELIZABETH (non compliant with CPAP)      9/22 :       On 1 liter of oxygen ( not on home oxygen )  Will wean off oxygen    Now on oral bumex 2 mg daily  Creatinine normal, mild metabolic alkalosis  Low K , will supplement  Cardiology ordered stress test, no CP currently    No fevers  On iv ancef for MSSA bacteremia 9/19  Repeat blood cultures 9/21, 9/22 : NGTD  ID Following, plan for MRI left knee and RAFAEL        Not medically ready for discharge at this time.  Antibiotic plan, ID clearance, sepsis work up in progress, need to wean off oxygen  Plan to discharge home with home PT              9/24 :       Off oxygen now    on oral bumex 2 mg daily, spironolactone  Creatinine normal, mild metabolic alkalosis  normal K , will supplement  Cardiology ordered stress test - negative, no CP currently    No fevers  On iv ancef for MSSA bacteremia 9/19  Repeat blood cultures 9/21, 9/22 : NGTD  ID Following,  MRI left knee : post op changes  RAFAEL on monday        Not medically ready for discharge at this time.  Antibiotic plan, ID clearance, RAFAEL on monday  Plan to discharge home with home PT      A/p :     Acute hypoxic respiratory failure secondary to HFpEF  Cardiogenic pulmonary edema 2/2 HFpEF  -Patient presented with oxygen saturation of high 80s, now sat 96% on 5L NC  - CTA Chest: No pulmonary embolism,    Slight pulmonary trunk enlargement; correlate for pulmonary hypertension.   Mild opacities in the lungs, likely atelectasis. Large hiatus hernia  - Echo: Normal left ventricular size and systolic performance with a visually  estimated  "ejection fraction of 60%. Mild biatrial enlargement  -Monitor daily weights, inputs and outputs, Fluid restriction  -BNP elevated  - Bicarb 33, contraction alkalosis  - am labs, will adjust diuretics accordingly  - Appreciate Cardio eval  - IV lasix, Spironolactone, Losartan  - Wean oxygen as tolerated  - Cardiology following      # Sepsis secondary to Bacteremia  CRP elevated, leukocytosis, Febrile on arrival (meets SIRS criterion)  Bcx: Gram positive cocci in Clusters, Staph aureus, MSSA, susceptibility pending  Echo: no vegetations  Repeat Bcx pending  ID eval appreciated  Vancomycin discontinued, On Ancef       Elevated troponin  Chest discomfort, resolved  Initial troponin was elevated at 36 followed by 37  Downtrended to 29  No acute ST segment changes on EKG      Pancreatic lesion vs mass  -exophytic density off the distal pancreatic tail; indeterminate for exophytic pancreatic parenchyma versus lesion. Recommend dedicated CT pancreas imaging   - CT Pancreas: no pancreatic mass       Hypothyroidism  TSH wnl  -PTA levothyroxine    Hyperlipidemia  - Statin    Severe Obesity  BMI: 44.74    Imaging finding  -Large hiatal hernia - follow up with surgery as outpatient  -7mm right lung base nodule - Follow up with PMD/ Pulm outpatient    -- PT eval pending       Diet: Regular Diet Adult    DVT Prophylaxis: Enoxaparin (Lovenox) SQ  Wise Catheter: Not present  Lines: None     Cardiac Monitoring: None  Code Status: Full Code      Clinically Significant Risk Factors                         # Severe Obesity: Estimated body mass index is 42.36 kg/m  as calculated from the following:    Height as of this encounter: 1.575 m (5' 2\").    Weight as of this encounter: 105.1 kg (231 lb 9.6 oz).               Disposition Plan      Expected Discharge Date: 09/26/2023    Discharge Delays: IV Medication - consider oral or Home Infusion  Destination: home with family  Discharge Comments: cultures/IV antibiotics  ID following  daily " blood cutlures  ?RAFAEL next week          Jr Mendieta MD  Hospitalist Service  [facility]  Securely message with [employer] (more info)  Text page via [employer] Paging/Directory   ______________________________________________________________________        Physical Exam   Vital Signs: Temp: 98.3  F (36.8  C) Temp src: Oral BP: 125/65 Pulse: 83   Resp: 18 SpO2: 90 % O2 Device: None (Room air)    Weight: 231 lbs 9.6 oz    General Appearance:  No distress noted  Respiratory: Diminished air entry bilaterally basilarly, + mild crackles bilaterally  Cardiovascular: S1 and S2 well heard, no murmur or gallop  GI: Soft abdomen, no tenderness, normoactive bowel sounds  Skin: Chronic venous stasis skin changes on bilateral legs, + edema b/l improving       Medical Decision Making       35 MINUTES SPENT BY ME on the date of service doing chart review, history, exam, documentation & further activities per the note.      Data         Imaging results reviewed over the past 24 hrs:   No results found for this or any previous visit (from the past 24 hour(s)).

## 2023-09-25 NOTE — PROGRESS NOTES
[facility] Inpatient follow up       Patient:  Makayla Dow  Date of birth 1940, Medical record number [id number]  Date of Visit:  2023  Attending Physician: Shanda Barrow MD         Assessment and Recommendations:   Assessment:  Makayla Dow is a 82 year old female with   Status post bilateral TKA.  Some mild pain bilaterally worse in the left prosthetic knee.  Acute hypoxic respiratory failure.  Resolved.  MSSA bacteremia.  Positive blood culture on 2023, 2 out of 2 sets.  Follow-up blood culture on  in process.  So far no growth.  Source of bacteremia is not clear.  Has superficial excoriations on the left ankle that could be report of entry.  Mild pain in both prosthetic knees worse on the left side.  TTE on 2023 with no vegetation was on IV vancomycin.  Changed to IV Ancef on 2023.  Continues to have pain in the left knee. MRI negative for sign of infection at knee. New neck pain since yesterday.   Allergy to ampicillin.  Reaction rash.     Recommendations:   Cervical spine MRI   Continue IV Ancef  Will need IV antibiotic outpatient. Discussed with CM/SW  CRP today  Monitor for rash    Discussed with the patient, nursing staff and CM.     Cash Tse MD  [facility].   [facility]  Office Telephone [phone].  Fax [phone]  [facility] paging            Interval History:     HPI:  New neck pain since yesterday. RAFAEL revewed      Recent Inflammatory Biomarkers:   Recent Labs   Lab Test 23  0750 23  0745 23  1715 23  1517 20  1600 18  1437   PCAL  --   --  0.88*  --   --   --    WBC 10.3 13.0*  --  14.2* 6.4 4.8              Review of Systems:   CONSTITUTIONAL:    Temp Max: Temp (24hrs), Av.4  F (36.9  C), Min:98.2  F (36.8  C), Max:98.6  F (37  C)   .  Negative except for findings in the HPI.           Current Medications (antimicrobials  listed in bold):      aspirin  81 mg Oral Daily    bumetanide  2 mg Oral Daily    ceFAZolin  2 g Intravenous Q8H    enoxaparin ANTICOAGULANT  40 mg Subcutaneous Q12H    levothyroxine  50 mcg Oral Daily    losartan  25 mg Oral Daily    miconazole   Topical BID    oxyBUTYnin ER  10 mg Oral Daily    pravastatin  20 mg Oral Daily    sodium chloride (PF)  3 mL Intracatheter Q8H    spironolactone  12.5 mg Oral Daily              Allergies:     Allergies   Allergen Reactions    Hydroxychloroquine Itching    Ampicillin Rash    Naproxen Itching            Physical Exam:   Vitals were reviewed  Patient Vitals for the past 24 hrs:   BP Temp Temp src Pulse Resp SpO2 Weight   09/25/23 1115 115/59 98.4  F (36.9  C) Oral 62 18 90 % --   09/25/23 1045 (!) 162/68 98.4  F (36.9  C) Oral 64 16 -- --   09/25/23 1015 (!) 177/76 -- -- 60 (!) 118 91 % --   09/25/23 1005 (!) 177/76 98.4  F (36.9  C) Oral 69 18 92 % --   09/25/23 0950 (!) 169/72 98.2  F (36.8  C) Oral 70 18 90 % --   09/25/23 0935 137/63 -- -- 68 17 95 % --   09/25/23 0930 119/50 -- -- 68 23 96 % --   09/25/23 0925 (!) 143/63 -- -- 69 21 97 % --   09/25/23 0923 -- 98.5  F (36.9  C) Oral -- -- -- --   09/25/23 0920 (!) 144/63 -- -- 66 17 98 % --   09/25/23 0915 (!) 149/66 -- -- 67 18 96 % --   09/25/23 0910 (!) 163/72 -- -- 75 24 (!) 88 % --   09/25/23 0905 (!) 156/62 -- -- 72 20 96 % --   09/25/23 0900 (!) 156/73 -- -- 68 17 96 % --   09/25/23 0835 -- 98.6  F (37  C) Oral -- -- -- --   09/25/23 0834 138/64 -- -- 72 16 90 % --   09/25/23 0728 134/61 98.4  F (36.9  C) Oral 69 20 91 % --   09/24/23 2308 (!) 125/90 99.3  F (37.4  C) Oral 78 18 92 % --   09/24/23 1623 -- -- -- -- -- -- 105.1 kg (231 lb 9.6 oz)   09/24/23 1542 125/65 98.3  F (36.8  C) Oral 83 18 90 % --         Physical Examination:  Gen: Pleasant in no acute distress.  HEENT: NCAT. EOMI. PERRL.  Neck: Tenderness on palpation.   Lungs: Clear to ascultation bilat with no crackles or wheezes.  Card: RRR. NSR. No  RMG. Peripheral pulses present and symmetric. No edema.  Abd: Soft NT ND. No mass. Normal bowel sounds.  Skin: No rash.  Extr: Left prosthetic knee pain on palpation.  No clear evidence of infection.  Neuro: Alert and oriented to place time and person.        Laboratory Data:   ID Labs:  Microbiology labs:  Blood clearing 9/21, 22, 23  Blood Culture Peripheral Blood  Order: [id number]  Collected 9/19/2023  5:15 PM       Status: Final result       Visible to patient: No (inaccessible in MyChart)    Specimen Information: Peripheral Blood   0 Result Notes  Culture Positive on the 1st day of incubation Abnormal       Staphylococcus aureus Panic    1 of 2 bottles        Resulting Agency: [facility]     Susceptibility     Staphylococcus aureus     JENNIFER     Clindamycin 0.25 ug/mL Susceptible     Daptomycin 0.25 ug/mL Susceptible     Doxycycline <=0.5 ug/mL Susceptible     Erythromycin <=0.25 ug/mL Susceptible     Gentamicin <=0.5 ug/mL Susceptible     Oxacillin 0.5 ug/mL Susceptible 1     Tetracycline <=1 ug/mL Susceptible     Trimethoprim/Sulfamethoxazole <=0.5/9.5 u... Susceptible     Vancomycin 1 ug/mL Susceptible              1 Oxacillin susceptible isolates are susceptible to cephalosporins (example: cefazolin and cephalexin) and beta lactam combination agents. Oxacillin resistant isolates are resistant to these agents.            Specimen Collected: 09/19/23  5:15 PM Last Resulted: 09/22/23  7:22 AM           No lab results found.  Recent Labs   Lab Test 09/21/23  0750 09/20/23  0745 09/19/23  1517 08/27/20  1600 08/06/18  1437   WBC 10.3 13.0* 14.2* 6.4 4.8       Recent Labs   Lab Test 09/23/23  0650 09/22/23  0633 09/21/23  0750 09/20/23  0745   CR 0.88  0.87 0.94 0.73 0.68   GFRESTIMATED 64  65 60* 81 85         Hematology Studies  Recent Labs   Lab Test 09/25/23  0744 09/22/23  0633 09/21/23  0750 09/20/23  0745 09/19/23  1517 08/27/20  1600 08/06/18  1437   WBC  --   --  10.3 13.0* 14.2* 6.4 4.8   HCT  --   --  36.6  35.7 35.6 43.1 40.7    202 212 177 193 215 227         Metabolic  Recent Labs   Lab Test 09/23/23  0650 09/22/23  0633 09/21/23  0750    138 140   BUN 19.1 15.4 10.3   CO2 31* 33* 33*   CR 0.88  0.87 0.94 0.73   GFRESTIMATED 64  65 60* 81         Hepatic Studies  Recent Labs   Lab Test 08/06/18  1437 02/12/18  1012   BILITOTAL 0.4 0.8   ALKPHOS 105 104   ALBUMIN 3.5 3.4*   AST 24 24   ALT 18 19         ImmunologlobulinsNo lab results found.         Imaging Data:   Reviewed

## 2023-09-25 NOTE — PLAN OF CARE
Goal Outcome Evaluation:    Alert and oriented x 4. Back from her RAFAEL. Tolerating diet.  Sitting in the chair. RA 93%. Dyspnea on exertion.  Lungs clear. Pain in her neck. Feels she slept on it wrong.

## 2023-09-25 NOTE — PROGRESS NOTES
[facility]    Medicine Progress Note - Hospitalist Service    Date of Admission:  9/19/2023    Assessment & Plan   Makayla Dow is a 82 year old female admitted on 9/19/2023. She presented to the ER with fever, chest discomfort of 1 day duration.  She was found to have oxygen saturation in the high 80s.  Past medical history significant for HTN, Hypothyroidism, ELIZABETH (non compliant with CPAP)      9/22 :       On 1 liter of oxygen ( not on home oxygen )  Will wean off oxygen    Now on oral bumex 2 mg daily  Creatinine normal, mild metabolic alkalosis  Low K , will supplement  Cardiology ordered stress test, no CP currently    No fevers  On iv ancef for MSSA bacteremia 9/19  Repeat blood cultures 9/21, 9/22 : NGTD  ID Following, plan for MRI left knee and RAFAEL        Not medically ready for discharge at this time.  Antibiotic plan, ID clearance, sepsis work up in progress, need to wean off oxygen  Plan to discharge home with home PT              9/24 :       Off oxygen now    on oral bumex 2 mg daily, spironolactone  Creatinine normal, mild metabolic alkalosis  normal K , will supplement  Cardiology ordered stress test - negative, no CP currently    No fevers  On iv ancef for MSSA bacteremia 9/19  Repeat blood cultures 9/21, 9/22 : NGTD  ID Following,  MRI left knee : post op changes  RAFAEL on monday        Not medically ready for discharge at this time.  Antibiotic plan, ID clearance, RAFAEL on monday  Plan to discharge home with home PT      9/25 :       RAFAEL : negative for vegetations  No fevers  On iv ancef for MSSA bacteremia 9/19  Repeat blood cultures 9/21, 9/22 : NGTD  ID Following,  MRI left knee : post op changes  Plan for cervical spine MRI    To be discharged once ok with ID            A/p :     Acute hypoxic respiratory failure secondary to HFpEF  Cardiogenic pulmonary edema 2/2 HFpEF  -Patient presented with oxygen saturation of high 80s, now sat 96% on 5L NC  - CTA Chest: No  pulmonary embolism,    Slight pulmonary trunk enlargement; correlate for pulmonary hypertension.   Mild opacities in the lungs, likely atelectasis. Large hiatus hernia  - Echo: Normal left ventricular size and systolic performance with a visually  estimated ejection fraction of 60%. Mild biatrial enlargement  -Monitor daily weights, inputs and outputs, Fluid restriction  -BNP elevated  - Bicarb 33, contraction alkalosis  - am labs, will adjust diuretics accordingly  - Appreciate Cardio eval  - IV lasix, Spironolactone, Losartan  - Wean oxygen as tolerated  - Cardiology following      # Sepsis secondary to Bacteremia  CRP elevated, leukocytosis, Febrile on arrival (meets SIRS criterion)  Bcx: Gram positive cocci in Clusters, Staph aureus, MSSA, susceptibility pending  Echo: no vegetations  Repeat Bcx pending  ID eval appreciated  Vancomycin discontinued, On Ancef       Elevated troponin  Chest discomfort, resolved  Initial troponin was elevated at 36 followed by 37  Downtrended to 29  No acute ST segment changes on EKG      Pancreatic lesion vs mass  -exophytic density off the distal pancreatic tail; indeterminate for exophytic pancreatic parenchyma versus lesion. Recommend dedicated CT pancreas imaging   - CT Pancreas: no pancreatic mass       Hypothyroidism  TSH wnl  -PTA levothyroxine    Hyperlipidemia  - Statin    Severe Obesity  BMI: 44.74    Imaging finding  -Large hiatal hernia - follow up with surgery as outpatient  -7mm right lung base nodule - Follow up with PMD/ Pulm outpatient    -- PT eval pending       Diet: Advance Diet as Tolerated: Regular Diet Adult; Regular Diet Adult    DVT Prophylaxis: Enoxaparin (Lovenox) SQ  Wise Catheter: Not present  Lines: None     Cardiac Monitoring: None  Code Status: Full Code      Clinically Significant Risk Factors                         # Severe Obesity: Estimated body mass index is 42.36 kg/m  as calculated from the following:    Height as of this encounter: 1.575 m  "(5' 2\").    Weight as of this encounter: 105.1 kg (231 lb 9.6 oz).               Disposition Plan      Expected Discharge Date: 2023    Discharge Delays: IV Medication - consider oral or Home Infusion  Destination: home with family  Discharge Comments: cultures/IV antibiotics  ID following  daily blood cutlures          Jr Mendieta MD  Hospitalist Service  [facility]  Securely message with [employer] (more info)  Text page via [employer] Paging/Directory   ______________________________________________________________________        Physical Exam   Vital Signs: Temp: 98.7  F (37.1  C) Temp src: Oral BP: (!) 161/70 Pulse: 71   Resp: 18 SpO2: 93 % O2 Device: None (Room air) Oxygen Delivery: 1 LPM  Weight: 231 lbs 9.6 oz    General Appearance:  No distress noted  Respiratory: Diminished air entry bilaterally basilarly, + mild crackles bilaterally  Cardiovascular: S1 and S2 well heard, no murmur or gallop  GI: Soft abdomen, no tenderness, normoactive bowel sounds  Skin: Chronic venous stasis skin changes on bilateral legs, + edema b/l improving       Medical Decision Making       35 MINUTES SPENT BY ME on the date of service doing chart review, history, exam, documentation & further activities per the note.      Data     I have personally reviewed the following data over the past 24 hrs:    N/A  \   N/A   / 235     N/A N/A N/A /  N/A   N/A N/A N/A \     Procal: N/A CRP: 118.20 (H) Lactic Acid: N/A         Imaging results reviewed over the past 24 hrs:   Recent Results (from the past 24 hour(s))   Echocardiogram RAFAEL    Narrative    [id number]  [id number]  [id number]  [id number]^DJEVI^[place] [address]     Name: EUGENE CRUZ  MRN: [id number]  : 1940  Study Date: 2023 08:51 AM  Age: 82 yrs  Gender: Female  Patient [facility]: [facility]  Reason For Study: Endocarditis  Ordering Physician: CHARO PARIKH  Performed By: EDUARDO   "   BSA: 2.0 m2  Height: 62 in  Weight: 231 lb  HR: 70  BP: 138/64 mmHg  ______________________________________________________________________________  Procedure  Complete RAFAEL Adult.  ______________________________________________________________________________  Interpretation Summary     Very poor acustic windows due to large hiatal hernia and heart shift. Limited  views showed no vegetations.  ______________________________________________________________________________  RAFAEL  Patient was sedated using Versed 2 mg. The heart rate, respiratory rate,  oxygen saturations, blood pressure, and response to care were monitored  throughout the procedure with the assistance of the nurse. Patient was sedated  using Fentanyl 50 mcg. 15 ml viscous Lidocaine, 0.5 ml Benzocaine spray. I  determined this patient to be an appropriate candidate for the planned  sedation and procedure and have reassessed the patient immediately prior to  sedation and procedure. Total sedation time: 17 minutes of continuous bedside  1:1 monitoring.     Left Ventricle  The left ventricle is not well visualized.     Right Ventricle  The right ventricle is not well visualized.     Atria  The left atrium is not well visualized.     Mitral Valve  The mitral valve is not well visualized.     Tricuspid Valve  The tricuspid valve is not well visualized.     Aortic Valve  Normal tricuspid aortic valve.     Pulmonic Valve  The pulmonic valve is normal in structure and function.     Vessels  Normal size ascending aorta.     Pericardial/Pleural  The pericardium is not well visualized.  ______________________________________________________________________________  Report approved by: Roland T Maryellen, MDon 09/25/2023 10:11 AM     ______________________________________________________________________________

## 2023-09-25 NOTE — PROGRESS NOTES
Care Management Follow Up    Length of Stay (days): 6    Expected Discharge Date: 09/26/2023     Concerns to be Addressed:     discharge planning  Patient plan of care discussed at interdisciplinary rounds: Yes    Anticipated Discharge Disposition:  Home with home care and home infusion      Anticipated Discharge Services:  PT, Home infusion   Anticipated Discharge DME:      Patient/family educated on Medicare website which has current facility and service quality ratings:    Education Provided on the Discharge Plan:    Patient/Family in Agreement with the Plan:      Referrals Placed by CM/SW:  [facility]  Private pay costs discussed: Not applicable    Additional Information:  Pt from home with spouse, will return home with home care PT. Referral sent to [facility], cost will be $31 out of pocket, new referral sent to [facility]. Pt will need 4 weeks of home infusion or TCU for antibiotics..      EUSEBIO Herbert

## 2023-09-26 ENCOUNTER — APPOINTMENT (OUTPATIENT)
Dept: PHYSICAL THERAPY | Facility: HOSPITAL | Age: 83
DRG: 871 | End: 2023-09-26
Payer: COMMERCIAL

## 2023-09-26 LAB
ANION GAP SERPL CALCULATED.3IONS-SCNC: 12 MMOL/L (ref 7–15)
BACTERIA BLD CULT: NO GROWTH
BACTERIA BLD CULT: NO GROWTH
BASOPHILS # BLD AUTO: 0.1 10E3/UL (ref 0–0.2)
BASOPHILS NFR BLD AUTO: 1 %
BUN SERPL-MCNC: 22.1 MG/DL (ref 8–23)
CALCIUM SERPL-MCNC: 8.8 MG/DL (ref 8.8–10.2)
CHLORIDE SERPL-SCNC: 91 MMOL/L (ref 98–107)
CREAT SERPL-MCNC: 1.19 MG/DL (ref 0.51–0.95)
DEPRECATED HCO3 PLAS-SCNC: 32 MMOL/L (ref 22–29)
EGFRCR SERPLBLD CKD-EPI 2021: 45 ML/MIN/1.73M2
EOSINOPHIL # BLD AUTO: 0.2 10E3/UL (ref 0–0.7)
EOSINOPHIL NFR BLD AUTO: 3 %
ERYTHROCYTE [DISTWIDTH] IN BLOOD BY AUTOMATED COUNT: 13.1 % (ref 10–15)
GLUCOSE SERPL-MCNC: 137 MG/DL (ref 70–99)
HCT VFR BLD AUTO: 35.6 % (ref 35–47)
HGB BLD-MCNC: 11.3 G/DL (ref 11.7–15.7)
IMM GRANULOCYTES # BLD: 0.2 10E3/UL
IMM GRANULOCYTES NFR BLD: 2 %
INR PPP: 1.14 (ref 0.85–1.15)
LYMPHOCYTES # BLD AUTO: 1 10E3/UL (ref 0.8–5.3)
LYMPHOCYTES NFR BLD AUTO: 11 %
MCH RBC QN AUTO: 29.4 PG (ref 26.5–33)
MCHC RBC AUTO-ENTMCNC: 31.7 G/DL (ref 31.5–36.5)
MCV RBC AUTO: 93 FL (ref 78–100)
MONOCYTES # BLD AUTO: 0.8 10E3/UL (ref 0–1.3)
MONOCYTES NFR BLD AUTO: 9 %
NEUTROPHILS # BLD AUTO: 6.7 10E3/UL (ref 1.6–8.3)
NEUTROPHILS NFR BLD AUTO: 74 %
NRBC # BLD AUTO: 0 10E3/UL
NRBC BLD AUTO-RTO: 0 /100
PLATELET # BLD AUTO: 270 10E3/UL (ref 150–450)
POTASSIUM SERPL-SCNC: 3.2 MMOL/L (ref 3.4–5.3)
RBC # BLD AUTO: 3.84 10E6/UL (ref 3.8–5.2)
SODIUM SERPL-SCNC: 135 MMOL/L (ref 135–145)
WBC # BLD AUTO: 9 10E3/UL (ref 4–11)

## 2023-09-26 PROCEDURE — 120N000001 HC R&B MED SURG/OB

## 2023-09-26 PROCEDURE — 250N000013 HC RX MED GY IP 250 OP 250 PS 637: Performed by: STUDENT IN AN ORGANIZED HEALTH CARE EDUCATION/TRAINING PROGRAM

## 2023-09-26 PROCEDURE — 80048 BASIC METABOLIC PNL TOTAL CA: CPT | Performed by: INTERNAL MEDICINE

## 2023-09-26 PROCEDURE — 250N000011 HC RX IP 250 OP 636: Mod: JZ | Performed by: STUDENT IN AN ORGANIZED HEALTH CARE EDUCATION/TRAINING PROGRAM

## 2023-09-26 PROCEDURE — 250N000013 HC RX MED GY IP 250 OP 250 PS 637: Performed by: INTERNAL MEDICINE

## 2023-09-26 PROCEDURE — 36415 COLL VENOUS BLD VENIPUNCTURE: CPT | Performed by: INTERNAL MEDICINE

## 2023-09-26 PROCEDURE — 36415 COLL VENOUS BLD VENIPUNCTURE: CPT | Performed by: NURSE PRACTITIONER

## 2023-09-26 PROCEDURE — 85610 PROTHROMBIN TIME: CPT | Performed by: NURSE PRACTITIONER

## 2023-09-26 PROCEDURE — 85025 COMPLETE CBC W/AUTO DIFF WBC: CPT | Performed by: INTERNAL MEDICINE

## 2023-09-26 PROCEDURE — 97530 THERAPEUTIC ACTIVITIES: CPT | Mod: GP

## 2023-09-26 PROCEDURE — 99233 SBSQ HOSP IP/OBS HIGH 50: CPT | Performed by: INTERNAL MEDICINE

## 2023-09-26 PROCEDURE — 97116 GAIT TRAINING THERAPY: CPT | Mod: GP

## 2023-09-26 PROCEDURE — 99232 SBSQ HOSP IP/OBS MODERATE 35: CPT | Performed by: STUDENT IN AN ORGANIZED HEALTH CARE EDUCATION/TRAINING PROGRAM

## 2023-09-26 PROCEDURE — 99222 1ST HOSP IP/OBS MODERATE 55: CPT | Performed by: PHYSICIAN ASSISTANT

## 2023-09-26 RX ORDER — LIDOCAINE 4 G/G
1 PATCH TOPICAL
Status: DISCONTINUED | OUTPATIENT
Start: 2023-09-26 | End: 2023-09-29 | Stop reason: HOSPADM

## 2023-09-26 RX ORDER — LIDOCAINE 40 MG/G
CREAM TOPICAL
Status: CANCELLED | OUTPATIENT
Start: 2023-09-26

## 2023-09-26 RX ORDER — ACETAMINOPHEN 325 MG/1
975 TABLET ORAL 3 TIMES DAILY
Status: DISCONTINUED | OUTPATIENT
Start: 2023-09-26 | End: 2023-09-29 | Stop reason: HOSPADM

## 2023-09-26 RX ORDER — POTASSIUM CHLORIDE 1500 MG/1
20 TABLET, EXTENDED RELEASE ORAL 2 TIMES DAILY
Status: COMPLETED | OUTPATIENT
Start: 2023-09-26 | End: 2023-09-27

## 2023-09-26 RX ORDER — ACETAMINOPHEN 650 MG/1
650 SUPPOSITORY RECTAL 3 TIMES DAILY
Status: DISCONTINUED | OUTPATIENT
Start: 2023-09-26 | End: 2023-09-29 | Stop reason: HOSPADM

## 2023-09-26 RX ORDER — SODIUM CHLORIDE 9 MG/ML
INJECTION, SOLUTION INTRAVENOUS CONTINUOUS
Status: CANCELLED | OUTPATIENT
Start: 2023-09-26

## 2023-09-26 RX ORDER — MULTIPLE VITAMINS W/ MINERALS TAB 9MG-400MCG
1 TAB ORAL DAILY
Status: DISCONTINUED | OUTPATIENT
Start: 2023-09-26 | End: 2023-09-29 | Stop reason: HOSPADM

## 2023-09-26 RX ORDER — METHOCARBAMOL 500 MG/1
500 TABLET, FILM COATED ORAL 3 TIMES DAILY PRN
Status: DISCONTINUED | OUTPATIENT
Start: 2023-09-26 | End: 2023-09-29 | Stop reason: HOSPADM

## 2023-09-26 RX ADMIN — OXYCODONE HYDROCHLORIDE 2.5 MG: 5 TABLET ORAL at 06:26

## 2023-09-26 RX ADMIN — POTASSIUM CHLORIDE 20 MEQ: 1500 TABLET, EXTENDED RELEASE ORAL at 23:13

## 2023-09-26 RX ADMIN — ACETAMINOPHEN 650 MG: 325 TABLET, FILM COATED ORAL at 06:26

## 2023-09-26 RX ADMIN — Medication 81 MG: at 10:08

## 2023-09-26 RX ADMIN — SPIRONOLACTONE 12.5 MG: 25 TABLET ORAL at 10:08

## 2023-09-26 RX ADMIN — CEFAZOLIN SODIUM 2 G: 2 INJECTION, SOLUTION INTRAVENOUS at 15:13

## 2023-09-26 RX ADMIN — MICONAZOLE NITRATE: 20 POWDER TOPICAL at 20:06

## 2023-09-26 RX ADMIN — LIDOCAINE 1 PATCH: 4 PATCH TOPICAL at 15:12

## 2023-09-26 RX ADMIN — BUMETANIDE 2 MG: 2 TABLET ORAL at 10:08

## 2023-09-26 RX ADMIN — CEFAZOLIN SODIUM 2 G: 2 INJECTION, SOLUTION INTRAVENOUS at 05:28

## 2023-09-26 RX ADMIN — OXYBUTYNIN CHLORIDE 10 MG: 10 TABLET, EXTENDED RELEASE ORAL at 10:07

## 2023-09-26 RX ADMIN — Medication 1 TABLET: at 15:13

## 2023-09-26 RX ADMIN — ACETAMINOPHEN 975 MG: 325 TABLET ORAL at 15:13

## 2023-09-26 RX ADMIN — ACETAMINOPHEN 975 MG: 325 TABLET ORAL at 20:05

## 2023-09-26 RX ADMIN — CEFAZOLIN SODIUM 2 G: 2 INJECTION, SOLUTION INTRAVENOUS at 21:51

## 2023-09-26 RX ADMIN — ENOXAPARIN SODIUM 40 MG: 40 INJECTION SUBCUTANEOUS at 20:05

## 2023-09-26 RX ADMIN — LEVOTHYROXINE SODIUM 50 MCG: 0.03 TABLET ORAL at 05:28

## 2023-09-26 RX ADMIN — ENOXAPARIN SODIUM 40 MG: 40 INJECTION SUBCUTANEOUS at 10:07

## 2023-09-26 RX ADMIN — MICONAZOLE NITRATE: 20 POWDER TOPICAL at 10:09

## 2023-09-26 RX ADMIN — OXYCODONE HYDROCHLORIDE 2.5 MG: 5 TABLET ORAL at 00:38

## 2023-09-26 RX ADMIN — ACETAMINOPHEN 650 MG: 325 TABLET, FILM COATED ORAL at 00:38

## 2023-09-26 RX ADMIN — PRAVASTATIN SODIUM 20 MG: 20 TABLET ORAL at 10:07

## 2023-09-26 RX ADMIN — LOSARTAN POTASSIUM 25 MG: 25 TABLET, FILM COATED ORAL at 10:07

## 2023-09-26 ASSESSMENT — ACTIVITIES OF DAILY LIVING (ADL)
ADLS_ACUITY_SCORE: 32
ADLS_ACUITY_SCORE: 41
ADLS_ACUITY_SCORE: 32
ADLS_ACUITY_SCORE: 32
ADLS_ACUITY_SCORE: 40
ADLS_ACUITY_SCORE: 40
ADLS_ACUITY_SCORE: 32

## 2023-09-26 NOTE — PLAN OF CARE
Goal Outcome Evaluation:      Plan of Care Reviewed With: patient    Overall Patient Progress: no changeOverall Patient Progress: no change         ASSUMED CARES: [id number]  STATUS: Pt admitted 9/19 for CP. Hypoxia. Total knee replacement. ELIZABETH. HTN. MSSA Bacteremia. +Pulm Edema.   NEURO: A/o x 4.   VS: VSS on RA.   ACTIVITY: Up with A1.   PAIN: C/o pain in right and left side of neck R>L- PRN pain meds given- refer to MAR. Heating pad also in place.   CARDIAC: Denies CP.   RESP: +DESAI.   GI/: External catheter+.   DIET: Regular  SKIN: No adjustments made.   LDA'S: L PIV infusing NS 30 ml/hr. IV abx given per order.   LABS: MRI (Cervical Spine) completed today- results in EPIC.   CHANGES THIS SHIFT: Cervical Spine MRI completed.   POC: Cont with POC. Plan for discharge to TCU vs home with HC. Bed alarm on for pt safety. Call light within reach.

## 2023-09-26 NOTE — CONSULTS
"    Remote note    Received request: left neck pain, CRP high, C3-4 Left facet fluid    Imaging:   EXAM: MR CERVICAL SPINE W/O and W CONTRAST  LOCATION: [facility]  DATE: 9/25/2023  IMPRESSION:  1.  Fluid in the left C3-C4 facet joint with surrounding inflammatory change. Septic arthritis is a primary consideration as the degree of inflammation is is greater than expected for degenerative arthropathy.  2.  Epidural enhancement at C3-C4 could reflect reactive change versus early phlegmon formation. No epidural abscess/drainable collection.  3.  Multilevel degenerative changes with multilevel cord flattening and moderate canal stenosis at C5-C6 and C6-C7.  4.  No abnormal cord signal.    Labs:   Lab Results   Component Value Date    HGB 11.3 09/26/2023     Lab Results   Component Value Date     09/26/2023     No results found for: INR       EXAM:   /55 (BP Location: Right arm)   Pulse 64   Temp 98.8  F (37.1  C) (Oral)   Resp 17   Ht 1.575 m (5' 2\")   Wt 105.1 kg (231 lb 9.6 oz)   SpO2 90%   BMI 42.36 kg/m         ASSESSMENT:  82 year old with abnormal cervical MRI with concerns for septic arthritis in the left C3-4 joint. Neurosurgery requesting left C3-4 joint to obtain cultures.     Dr. Duffy reviewed imaging and can do left C3-4 facet joint aspiration/biopsy.       PLAN:   Left cervical three - four facet joint aspiration/biopsy planned for 9/27 around 1pm  NPO after midnight   Hold Lovenox after midnight   Check INR      0 minutes spent with patient at bedside.  20 minutes spent in reviewing imaging, test results and in consultation with colleagues.  Total time: 20 minutes       Ronel Fountain, CNP  [phone]    "

## 2023-09-26 NOTE — PROGRESS NOTES
"CLINICAL NUTRITION SERVICES - ASSESSMENT NOTE     Nutrition Prescription    RECOMMENDATIONS FOR MDs/PROVIDERS TO ORDER:  Recommend mvi with minerals d/t not meeting RDIs with inadequate intake    Malnutrition Status:    Moderate in acute illness    Recommendations already ordered by Registered Dietitian (RD):  Ensure enlive daily     Future/Additional Recommendations:  Adjust supplement pending intake, weight, tolerance, acceptance, labs  Continue to reinforce increased nutrition needs and 2g Na diet for CHF       REASON FOR ASSESSMENT  Makayla Dow is a/an 82 year old female assessed by the dietitian for [facility]    Pt presents with HF, sepsis d/t bactremia, lg hiatal hernia  Hx HLD, HTN    NUTRITION HISTORY  Pt lives with her  in a house. May be needing more assistance as  is becoming more forgetful.  [facility] has seen pt and educated her on CHF diet yesterday. Pt reported making some meals at home. She loves salt and they do go out to eat at times. [facility] provided information on [employer] delivery    Pt reports she has had meals on wheels in the past and didn't like them very much. Meals accumulated too quickly and they weren't able to keep up. She prefers to make her own meals.   Pt states she has had 2g Na ed in the past.   She feels she \"eats a lot\" at home and was eating WNL PTA.     Pt states her appetite is < baseline here and has been variable \" I eat when I am hungry\".   She has not had supplements in the past    CURRENT NUTRITION ORDERS  Diet: Regular  Intake/Tolerance: Fair, eating >75% of meals but ordering inadequately; 475-880 kcal, 15-35 g protein/day meeting an average of 40% of her estimated kcal and 30% of her estimated protein needs    Pt is willing to try supplements. Discussed current inadequate intake compared to needs. Pt not overly concerned with current decreased intake noting she does not eat if she is not hungry    LABS  Labs reviewed   (H), " "increased    MEDICATIONS  Medications reviewed  Bumex oral bid, iv abx, levothyroxine, oxybutynin, pravachol, spironolactone    ANTHROPOMETRICS  Height: 157.5 cm (5' 2\")  Most Recent Weight: 105.1 kg (231 lb 9.6 oz)  -standing scale  IBW: 50.1 kg  BMI: Obesity Grade III BMI >40  Weight History:   09/19/23 110.95 kg (244 lb 9.6 oz) -standing scale - fluid up with HF  04/07/23 259 lb  10.8% weight loss x 5 months or less  Pt feels this weight loss was all fluid weight loss?    Dosing Weight: 63.8 kg adjusted weight    ASSESSED NUTRITION NEEDS  Estimated Energy Needs: 1473-5887 kcals/day (25 - 30 kcals/kg)  Justification: Maintenance  Estimated Protein Needs: 76-95 grams protein/day (1.2-1.5 grams of pro/kg)  Justification: Hypercatabolism with acute illness and Repletion  Estimated Fluid Needs: 9790-3775 mL/day (1 mL/kcal)   Justification: Maintenance and Per provider pending fluid status    PHYSICAL FINDINGS  See malnutrition section below.  GI - 3 formed BM yesterday    MALNUTRITION:  % Weight Loss:  Difficult to assess- 10% weight loss in 5 months or less-gap in weight data and pt feels weight loss was fluid  % Intake:  </= 50% for >/= 5 days (severe malnutrition)  Subcutaneous Fat Loss:  None observed  Muscle Loss:  None observed  Fluid Retention:  Mild +1 LE  Pt notes no loss of strength    Malnutrition Diagnosis: Moderate malnutrition  In Context of:  Acute illness or injury    NUTRITION DIAGNOSIS  Inadequate oral intake related to decreased appetite, food preferences as evidenced by meeting <50% of estimated needs since admit     INTERVENTIONS  Implementation  -Nutrition Education: Provided education on general 2g Na needs, increased needs for recovery     -Add ensure enlive daily to help meet nutrition needs  -Recommend mvi with minerals d/t not meeting RDIs with inadequate intake    Goals  Meet >75% of estimated nutrition needs  Maintain weight     Monitoring/Evaluation  Progress toward goals will be " monitored and evaluated per protocol.

## 2023-09-26 NOTE — PROGRESS NOTES
[facility] Inpatient follow up       Patient:  Makayla Dow  Date of birth 1940, Medical record number [id number]  Date of Visit:  2023  Attending Physician: Shanda Barrow MD         Assessment and Recommendations:   Assessment:  Makayla Dow is a 82 year old female with   Status post bilateral TKA.  Some mild pain bilaterally worse in the left prosthetic knee.  Acute hypoxic respiratory failure.  Resolved.  MSSA bacteremia.  Positive blood culture on 2023, 2 out of 2 sets.  Follow-up blood culture on , ,  in process.  So far no growth.  Has superficial excoriations on the left ankle that could be report of entry.  Mild pain in both prosthetic knees worse on the left side.  MRI of the left prosthetic knee negative for septic arthritis.  TTE on 2023 followed by [facility] with no evidence of infective endocarditis.  Changed to IV Ancef on 2023.   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  On IV Ancef.  Allergy to ampicillin.  Reaction rash.     Recommendations:   Continue IV Ancef  Aspiration of the C3-C4 facet joint fluid collection.  Consider neurosurgery consult  Monitor for rash    Discussed with the patient, nursing staff and CM.     Cash Tse MD  [facility].   [facility]  Office Telephone [phone].  Fax [phone]  [facility] paging            Interval History:     HPI: Neck MRI reviewed.  The results as above.  Continues to have neck pain.    Recent Inflammatory Biomarkers:   Recent Labs   Lab Test 23  1226 23  0750 23  0745 23  1715 23  1517 20  1600 18  1437   PCAL  --   --   --  0.88*  --   --   --    WBC 9.0 10.3 13.0*  --  14.2* 6.4 4.8              Review of Systems:   CONSTITUTIONAL:    Temp Max: Temp (24hrs), Av.4  F (36.9  C), Min:98.2  F (36.8   C), Max:98.6  F (37  C)   .  Negative except for findings in the HPI.           Current Medications (antimicrobials listed in bold):      acetaminophen  975 mg Oral TID    Or    acetaminophen  650 mg Rectal TID    aspirin  81 mg Oral Daily    bumetanide  2 mg Oral Daily    ceFAZolin  2 g Intravenous Q8H    enoxaparin ANTICOAGULANT  40 mg Subcutaneous Q12H    levothyroxine  50 mcg Oral Daily    lidocaine  1 patch Transdermal Q24H DARWIN    losartan  25 mg Oral Daily    miconazole   Topical BID    oxyBUTYnin ER  10 mg Oral Daily    pravastatin  20 mg Oral Daily    sodium chloride (PF)  3 mL Intracatheter Q8H    spironolactone  12.5 mg Oral Daily              Allergies:     Allergies   Allergen Reactions    Hydroxychloroquine Itching    Ampicillin Rash    Naproxen Itching            Physical Exam:   Vitals were reviewed  Patient Vitals for the past 24 hrs:   BP Temp Temp src Pulse Resp SpO2   09/26/23 0754 115/55 98.8  F (37.1  C) Oral 64 17 90 %   09/25/23 2323 (!) 141/62 99.3  F (37.4  C) Oral 74 18 92 %   09/25/23 1531 (!) 161/70 98.7  F (37.1  C) Oral 71 18 93 %         Physical Examination:  Gen: Pleasant in no acute distress.  HEENT: NCAT. EOMI. PERRL.  Neck: Tenderness on palpation.   Lungs: Clear to ascultation bilat with no crackles or wheezes.  Card: RRR. NSR. No RMG. Peripheral pulses present and symmetric. No edema.  Abd: Soft NT ND. No mass. Normal bowel sounds.  Skin: No rash.  Extr: Left prosthetic knee pain on palpation.  No clear evidence of infection.  Neuro: Alert and oriented to place time and person.        Laboratory Data:   ID Labs:  Microbiology labs:  Blood clearing 9/21, 22, 23  Blood Culture Peripheral Blood  Order: [id number]  Collected 9/19/2023  5:15 PM       Status: Final result       Visible to patient: No (inaccessible in MyChart)    Specimen Information: Peripheral Blood   0 Result Notes  Culture Positive on the 1st day of incubation Abnormal       Staphylococcus aureus Panic    1 of 2  bottles        Resulting Agency: [facility]     Susceptibility     Staphylococcus aureus     JENNIFER     Clindamycin 0.25 ug/mL Susceptible     Daptomycin 0.25 ug/mL Susceptible     Doxycycline <=0.5 ug/mL Susceptible     Erythromycin <=0.25 ug/mL Susceptible     Gentamicin <=0.5 ug/mL Susceptible     Oxacillin 0.5 ug/mL Susceptible 1     Tetracycline <=1 ug/mL Susceptible     Trimethoprim/Sulfamethoxazole <=0.5/9.5 u... Susceptible     Vancomycin 1 ug/mL Susceptible              1 Oxacillin susceptible isolates are susceptible to cephalosporins (example: cefazolin and cephalexin) and beta lactam combination agents. Oxacillin resistant isolates are resistant to these agents.            Specimen Collected: 09/19/23  5:15 PM Last Resulted: 09/22/23  7:22 AM           No lab results found.  Recent Labs   Lab Test 09/26/23  1226 09/21/23  0750 09/20/23  0745 09/19/23  1517 08/27/20  1600 08/06/18  1437   WBC 9.0 10.3 13.0* 14.2* 6.4 4.8       Recent Labs   Lab Test 09/26/23  1226 09/23/23  0650 09/22/23  0633 09/21/23  0750   CR 1.19* 0.88  0.87 0.94 0.73   GFRESTIMATED 45* 64  65 60* 81         Hematology Studies  Recent Labs   Lab Test 09/26/23  1226 09/25/23  0744 09/22/23  0633 09/21/23  0750 09/20/23  0745 09/19/23  1517 08/27/20  1600 08/06/18  1437   WBC 9.0  --   --  10.3 13.0* 14.2* 6.4 4.8   HCT 35.6  --   --  36.6 35.7 35.6 43.1 40.7    235 202 212 177 193 215 227         Metabolic  Recent Labs   Lab Test 09/26/23  1226 09/23/23  0650 09/22/23  0633    139 138   BUN 22.1 19.1 15.4   CO2 32* 31* 33*   CR 1.19* 0.88  0.87 0.94   GFRESTIMATED 45* 64  65 60*         Hepatic Studies  Recent Labs   Lab Test 08/06/18  1437 02/12/18  1012   BILITOTAL 0.4 0.8   ALKPHOS 105 104   ALBUMIN 3.5 3.4*   AST 24 24   ALT 18 19         ImmunologlobulinsNo lab results found.         Imaging Data:   Reviewed   Study Result    Narrative & Impression   EXAM: MR CERVICAL SPINE W/O and W CONTRAST  LOCATION: [facility]  DATE: 9/25/2023     INDICATION: Neck pain in a patient with MSSA bacteremia of unclear source; fever and chills  COMPARISON: None.  CONTRAST: 10 mL Gadavist  TECHNIQUE: MRI Cervical Spine without and with IV contrast.     FINDINGS:   Normal vertebral body heights. Very mild marrow edema at the left C3-C4 facet. Otherwise, no suspicious marrow signal. Minimal retrolisthesis of C3 on C4 and C5 on C6. Minimal anterolisthesis of C7 on T1 and T1 on T2. Osseous fusion of the right C4-C5   facets. No abnormal cord signal. Epidural enhancement eccentrically to the left and posteriorly at C3-C4.       Major vascular flow voids in the neck are maintained.     Craniovertebral junction and C1-C2: Widely patent.     C2-C3: No significant canal or foraminal stenosis.      C3-C4: Broad-based disc and osteophyte. Bilateral facet arthropathy, right greater than left. Enhancement surrounding the left facet joint with trace fluid in the joint space. Mild canal stenosis. Mild right and severe left foraminal stenosis.      C4-C5: Broad-based disc and osteophyte. Bilateral facet arthropathy. Ventral cord flattening with mild canal stenosis. Mild right foraminal stenosis.     C5-C6: Broad-based disc and osteophyte. Bilateral uncovertebral spurring. Bilateral facet arthropathy. Ventral cord flattening with moderate canal stenosis. Severe bilateral foraminal stenosis.     C6-C7: Broad-based disc and osteophyte. Bilateral facet arthropathy. Moderate canal stenosis. Severe bilateral foraminal stenosis.      C7-T1: No significant canal or foraminal stenosis.                                                                      IMPRESSION:  1.  Fluid in the left C3-C4 facet joint with surrounding inflammatory change. Septic arthritis is a primary consideration as the degree of inflammation is is greater than expected for degenerative arthropathy.  2.  Epidural enhancement at C3-C4 could reflect reactive change versus early  phlegmon formation. No epidural abscess/drainable collection.  3.  Multilevel degenerative changes with multilevel cord flattening and moderate canal stenosis at C5-C6 and C6-C7.  4.  No abnormal cord signal.

## 2023-09-26 NOTE — CONSULTS
Neurosurgery Consult    HPI    Ms. Jenna Dow is a 82-year-old female who presented 9 days 19/2023 to the ER with fever chest discomfort, she has had work-up for MSSA bacteremia, she developed new neck 2 days ago, cervical MRI was performed yesterday and demonstrated signs of concern for septic arthritis of the left C3-4 facet joint.  Patient currently denies any radicular symptoms or myelopathic symptoms.  Reports neck pain worse on the left when she turns her head to the left.    Denies any symptoms radiating into her arms or into her legs.    Medical history  Hypertension, hypothyroid, obstructive sleep apnea        B/P: 115/55, T: 98.8, P: 64, R: 17       Exam    Alert and oriented no acute distress  Bilateral upper extremities with 5/5 strength  Joelle sign negative  Reflexes absent triceps, biceps, brachioradialis    Bilateral lower extremities with 5/5 strength  Reflexes absent patella/ankle  Negative straight leg raise bilaterally  Negative ankle clonus negative Babinski bilaterally    Cervical spine tender to palpation on the left in the upper cervical region.    Laboratory  CRP elevated at 118.20, was 35.70,  7 days ago    Imaging    Cervical MRI demonstrated    IMPRESSION:  1.  Fluid in the left C3-C4 facet joint with surrounding inflammatory change. Septic arthritis is a primary consideration as the degree of inflammation is is greater than expected for degenerative arthropathy.  2.  Epidural enhancement at C3-C4 could reflect reactive change versus early phlegmon formation. No epidural abscess/drainable collection.  3.  Multilevel degenerative changes with multilevel cord flattening and moderate canal stenosis at C5-C6 and C6-C7.  4.  No abnormal cord signal.    Assessment    MSSA bacteremia  Elevated CRP  Neck pain  Abnormal cervical MRI with concerns for septic arthritis in the left C3-4 joint.    Plan:      No surgical intervention recommended at this time, recommend evaluation by IR if  aspiration of left C3-4 joint is possible to obtain cultures, consult order placed.    Soft collar can be provided for comfort as needed    Recommend outpatient follow-up in neurosurgery clinic for evaluation of her cervical stenosis and discussion of symptoms of myelopathy as a nonurgent visit once her infectious symptoms have resolved.  Our clinic will help arrange this visit.    Neurosurgery will sign off at this time.    Reviewed with Dr. Palacios

## 2023-09-26 NOTE — PROGRESS NOTES
[facility]    Medicine Progress Note - Hospitalist Service    Date of Admission:  9/19/2023    Assessment & Plan   Makayla Dow is a 82 year old female admitted on 9/19/2023. She presented to the ER with fever, chest discomfort of 1 day duration.  She was found to have oxygen saturation in the high 80s.  Past medical history significant for HTN, Hypothyroidism, ELIZABETH (non compliant with CPAP)      9/22 :         On 1 liter of oxygen ( not on home oxygen )  Will wean off oxygen     Now on oral bumex 2 mg daily  Creatinine normal, mild metabolic alkalosis  Low K , will supplement  Cardiology ordered stress test, no CP currently     No fevers  On iv ancef for MSSA bacteremia 9/19  Repeat blood cultures 9/21, 9/22 : NGTD  ID Following, plan for MRI left knee and RAFAEL           Not medically ready for discharge at this time.  Antibiotic plan, ID clearance, sepsis work up in progress, need to wean off oxygen  Plan to discharge home with home PT                    9/24 :         Off oxygen now     on oral bumex 2 mg daily, spironolactone  Creatinine normal, mild metabolic alkalosis  normal K , will supplement  Cardiology ordered stress test - negative, no CP currently     No fevers  On iv ancef for MSSA bacteremia 9/19  Repeat blood cultures 9/21, 9/22 : NGTD  ID Following,  MRI left knee : post op changes  RAFAEL on monday           Not medically ready for discharge at this time.  Antibiotic plan, ID clearance, RAFAEL on monday  Plan to discharge home with home PT        9/25 :         RAFAEL : negative for vegetations  No fevers  On iv ancef for MSSA bacteremia 9/19  Repeat blood cultures 9/21, 9/22 : NGTD  ID Following,  MRI left knee : post op changes  Plan for cervical spine MRI     To be discharged once ok with ID    9/26 :       Consulted IR  Plan for  Left cervical three-four facet joint aspiration for cultures with moderate IV sedation. Scheduled for this afternoon in IR around 1300  Spine  surgery team signed off  Follow cultures, continue iv ancef, ID following      Not medically ready for discharge at this time.  Follow cultures from from aspiration from cervical spine, antibiotic plan        A/p :         Acute hypoxic respiratory failure secondary to HFpEF : resolved.  Cardiogenic pulmonary edema 2/2 HFpEF : resolved.  Metabolic alkalosis 2/2 diuresis    - CTA Chest: No pulmonary embolism,     - Echo: Normal left ventricular size and systolic performance with a visually, estimated ejection fraction of 60%. Mild biatrial enlargement    - cardio consulted  - s/p IV lasix - switched to oral bumex 2 mg oral daily, continue Spironolactone 12.5 mg daily, Losartan 25 mg daily  - Wean oxygen as tolerated  - Cardiology following       Sepsis secondary to MSSA Bacteremia    ID consulted  MSSA bacteremia 9/19 - 2/2 sets  Repeat blood cultures 9/21, 9/22, 9/23 : NGTD  RAFAEL 9/25 : no vegetations  MRI left knee 9/22 : post op changes    Neck pain, acute - for last few days, MRI cervical spine 9/25 : 1.  Fluid in the left C3-C4 facet joint with surrounding inflammatory change. Septic arthritis is a primary consideration as the degree of inflammation is is greater than expected for degenerative arthropathy. 2.  Epidural enhancement at C3-C4 could reflect reactive change versus early phlegmon formation. No epidural abscess/drainable collection.3.  Multilevel degenerative changes with multilevel cord flattening and moderate canal stenosis at C5-C6 and C6-C7.4.  No abnormal cord signal.    Consulted neurosurgery : No surgical intervention recommended at this time, recommend evaluation by IR if aspiration of left C3-4 joint is possible to obtain cultures, consult order placed. Soft collar can be provided for comfort as needed. Recommend outpatient follow-up in neurosurgery clinic for evaluation of her cervical stenosis and discussion of symptoms of myelopathy as a nonurgent visit once her infectious symptoms have  "resolved. Neurosurgery  clinic will help arrange this visit. Neurosurgery will sign off at this time.     9/27 : Consulted IR  S/p :  Left cervical three-four facet joint aspiration for cultures with moderate IV sedation. Scheduled for this afternoon in IR around 1300    On iv ancef - started 9/21 - for MSSA bacteremia 9/19, discontinued iv vancomycin  Follow cultures sent today, ID following  Will need at least 6 weeks of IV Ancef outpatient         Elevated troponin  Chest discomfort, resolved  Initial troponin was elevated at 36 followed by 37  Downtrended to 29  No acute ST segment changes on EKG  RAFAEL : no wall motion changes      Pancreatic lesion vs mass  -exophytic density off the distal pancreatic tail; indeterminate for exophytic pancreatic parenchyma versus lesion. Recommend dedicated CT pancreas imaging   - CT Pancreas: no pancreatic mass       Hypothyroidism  TSH wnl  -PTA levothyroxine    Hyperlipidemia  - Statin    Severe Obesity  BMI: 44.74    Imaging finding  -Large hiatal hernia - follow up with surgery as outpatient  -7mm right lung base nodule - Follow up with PMD/ Pulm outpatient               Diet: Advance Diet as Tolerated: Regular Diet Adult; Regular Diet Adult    DVT Prophylaxis: Enoxaparin (Lovenox) SQ  Wise Catheter: Not present  Lines: None     Cardiac Monitoring: None  Code Status: Full Code      Clinically Significant Risk Factors                         # Severe Obesity: Estimated body mass index is 42.36 kg/m  as calculated from the following:    Height as of this encounter: 1.575 m (5' 2\").    Weight as of this encounter: 105.1 kg (231 lb 9.6 oz).               Disposition Plan     Expected Discharge Date: 09/26/2023    Discharge Delays: IV Medication - consider oral or Home Infusion  Destination: home with family  Discharge Comments: cultures/IV antibiotics  ID following  daily blood cutlures          Jr Mendieta MD  Hospitalist Service  [facility] " Hospital  Securely message with [employer] (more info)  Text page via [facility] Paging/Directory   ______________________________________________________________________        Physical Exam   Vital Signs: Temp: 98.8  F (37.1  C) Temp src: Oral BP: 115/55 Pulse: 64   Resp: 17 SpO2: 90 % O2 Device: None (Room air)    Weight: 231 lbs 9.6 oz    General Appearance:  No distress noted  Respiratory: Diminished air entry bilaterally basilarly, + mild crackles bilaterally  Cardiovascular: S1 and S2 well heard, no murmur or gallop  GI: Soft abdomen, no tenderness, normoactive bowel sounds  Skin: Chronic venous stasis skin changes on bilateral legs, + edema b/l improving       Medical Decision Making       35 MINUTES SPENT BY ME on the date of service doing chart review, history, exam, documentation & further activities per the note.      Data     I have personally reviewed the following data over the past 24 hrs:    Procal: N/A CRP: N/A Lactic Acid: N/A         Imaging results reviewed over the past 24 hrs:   Recent Results (from the past 24 hour(s))   MR CERVICAL SPINE W/O & W CONTRAST    Narrative    EXAM: MR CERVICAL SPINE W/O and W CONTRAST  LOCATION: [facility]  DATE: 9/25/2023    INDICATION: Neck pain in a patient with MSSA bacteremia of unclear source; fever and chills  COMPARISON: None.  CONTRAST: 10 mL Gadavist  TECHNIQUE: MRI Cervical Spine without and with IV contrast.    FINDINGS:   Normal vertebral body heights. Very mild marrow edema at the left C3-C4 facet. Otherwise, no suspicious marrow signal. Minimal retrolisthesis of C3 on C4 and C5 on C6. Minimal anterolisthesis of C7 on T1 and T1 on T2. Osseous fusion of the right C4-C5   facets. No abnormal cord signal. Epidural enhancement eccentrically to the left and posteriorly at C3-C4.      Major vascular flow voids in the neck are maintained.    Craniovertebral junction and C1-C2: Widely patent.    C2-C3: No significant canal or foraminal  stenosis.     C3-C4: Broad-based disc and osteophyte. Bilateral facet arthropathy, right greater than left. Enhancement surrounding the left facet joint with trace fluid in the joint space. Mild canal stenosis. Mild right and severe left foraminal stenosis.     C4-C5: Broad-based disc and osteophyte. Bilateral facet arthropathy. Ventral cord flattening with mild canal stenosis. Mild right foraminal stenosis.    C5-C6: Broad-based disc and osteophyte. Bilateral uncovertebral spurring. Bilateral facet arthropathy. Ventral cord flattening with moderate canal stenosis. Severe bilateral foraminal stenosis.    C6-C7: Broad-based disc and osteophyte. Bilateral facet arthropathy. Moderate canal stenosis. Severe bilateral foraminal stenosis.     C7-T1: No significant canal or foraminal stenosis.      Impression    IMPRESSION:  1.  Fluid in the left C3-C4 facet joint with surrounding inflammatory change. Septic arthritis is a primary consideration as the degree of inflammation is is greater than expected for degenerative arthropathy.  2.  Epidural enhancement at C3-C4 could reflect reactive change versus early phlegmon formation. No epidural abscess/drainable collection.  3.  Multilevel degenerative changes with multilevel cord flattening and moderate canal stenosis at C5-C6 and C6-C7.  4.  No abnormal cord signal.

## 2023-09-26 NOTE — PROGRESS NOTES
Care Management Follow Up    Length of Stay (days): 7    Expected Discharge Date: 09/26/2023     Concerns to be Addressed:     Discharge planning/ IV antibiotic  Patient plan of care discussed at interdisciplinary rounds: Yes    Anticipated Discharge Disposition:  Home with outpatient infusion center or home with home infusion      Anticipated Discharge Services:    Anticipated Discharge DME:      Patient/family educated on Medicare website which has current facility and service quality ratings:    Education Provided on the Discharge Plan:  yes  Patient/Family in Agreement with the Plan:      Referrals Placed by CM/SW:    Private pay costs discussed: insurance costs co-pays for cost of IV antibiotics home infusion. [facility] home infusion $31.74 a day for drug and supplies, [facility] is $157 a week.     Additional Information:  ARMIN placed call to [facility]- spoke to Deepika- they are no longer in network with [facility].   ARMIN sent referral to [facility].   ARMIN spoke to [facility]- pt out of pocket will be 157 a week.   ARMIN met with pt, she states she does not have enough money to pay for home infusion, she requested SW call her  to see his thoughts. ARMIN spoke to pt  [facility], he states they dont have enough funds to cover home infusion costs, he is willing to give her a ride to an infusion center daily if needed.     EUSEBIO Herbert

## 2023-09-27 ENCOUNTER — TELEPHONE (OUTPATIENT)
Dept: NEUROSURGERY | Facility: CLINIC | Age: 83
End: 2023-09-27
Payer: COMMERCIAL

## 2023-09-27 ENCOUNTER — APPOINTMENT (OUTPATIENT)
Dept: PHYSICAL THERAPY | Facility: HOSPITAL | Age: 83
DRG: 871 | End: 2023-09-27
Payer: COMMERCIAL

## 2023-09-27 ENCOUNTER — APPOINTMENT (OUTPATIENT)
Dept: INTERVENTIONAL RADIOLOGY/VASCULAR | Facility: HOSPITAL | Age: 83
DRG: 871 | End: 2023-09-27
Attending: NURSE PRACTITIONER
Payer: COMMERCIAL

## 2023-09-27 LAB
ANION GAP SERPL CALCULATED.3IONS-SCNC: 10 MMOL/L (ref 7–15)
BACTERIA BLD CULT: NO GROWTH
BUN SERPL-MCNC: 26.7 MG/DL (ref 8–23)
CALCIUM SERPL-MCNC: 8.8 MG/DL (ref 8.8–10.2)
CHLORIDE SERPL-SCNC: 93 MMOL/L (ref 98–107)
CREAT SERPL-MCNC: 0.92 MG/DL (ref 0.51–0.95)
DEPRECATED HCO3 PLAS-SCNC: 33 MMOL/L (ref 22–29)
EGFRCR SERPLBLD CKD-EPI 2021: 61 ML/MIN/1.73M2
GLUCOSE SERPL-MCNC: 136 MG/DL (ref 70–99)
HOLD SPECIMEN: NORMAL
MAGNESIUM SERPL-MCNC: 1.9 MG/DL (ref 1.7–2.3)
POTASSIUM SERPL-SCNC: 3.6 MMOL/L (ref 3.4–5.3)
SODIUM SERPL-SCNC: 136 MMOL/L (ref 135–145)

## 2023-09-27 PROCEDURE — 250N000013 HC RX MED GY IP 250 OP 250 PS 637: Performed by: INTERNAL MEDICINE

## 2023-09-27 PROCEDURE — 250N000013 HC RX MED GY IP 250 OP 250 PS 637: Performed by: STUDENT IN AN ORGANIZED HEALTH CARE EDUCATION/TRAINING PROGRAM

## 2023-09-27 PROCEDURE — 97116 GAIT TRAINING THERAPY: CPT | Mod: GP

## 2023-09-27 PROCEDURE — 87205 SMEAR GRAM STAIN: CPT | Performed by: NURSE PRACTITIONER

## 2023-09-27 PROCEDURE — 87070 CULTURE OTHR SPECIMN AEROBIC: CPT | Performed by: NURSE PRACTITIONER

## 2023-09-27 PROCEDURE — 99232 SBSQ HOSP IP/OBS MODERATE 35: CPT | Performed by: STUDENT IN AN ORGANIZED HEALTH CARE EDUCATION/TRAINING PROGRAM

## 2023-09-27 PROCEDURE — 97530 THERAPEUTIC ACTIVITIES: CPT | Mod: GP

## 2023-09-27 PROCEDURE — 83735 ASSAY OF MAGNESIUM: CPT | Performed by: HOSPITALIST

## 2023-09-27 PROCEDURE — 97110 THERAPEUTIC EXERCISES: CPT | Mod: GP

## 2023-09-27 PROCEDURE — 999N000127 HC STATISTIC PERIPHERAL IV START W US GUIDANCE

## 2023-09-27 PROCEDURE — 80048 BASIC METABOLIC PNL TOTAL CA: CPT | Performed by: HOSPITALIST

## 2023-09-27 PROCEDURE — 64490 INJ PARAVERT F JNT C/T 1 LEV: CPT

## 2023-09-27 PROCEDURE — 87075 CULTR BACTERIA EXCEPT BLOOD: CPT | Performed by: NURSE PRACTITIONER

## 2023-09-27 PROCEDURE — 0R913ZX DRAINAGE OF CERVICAL VERTEBRAL JOINT, PERCUTANEOUS APPROACH, DIAGNOSTIC: ICD-10-PCS | Performed by: RADIOLOGY

## 2023-09-27 PROCEDURE — 120N000001 HC R&B MED SURG/OB

## 2023-09-27 PROCEDURE — 250N000011 HC RX IP 250 OP 636: Mod: JZ | Performed by: STUDENT IN AN ORGANIZED HEALTH CARE EDUCATION/TRAINING PROGRAM

## 2023-09-27 PROCEDURE — 36415 COLL VENOUS BLD VENIPUNCTURE: CPT | Performed by: HOSPITALIST

## 2023-09-27 PROCEDURE — 87102 FUNGUS ISOLATION CULTURE: CPT | Performed by: NURSE PRACTITIONER

## 2023-09-27 PROCEDURE — 99232 SBSQ HOSP IP/OBS MODERATE 35: CPT | Performed by: INTERNAL MEDICINE

## 2023-09-27 RX ORDER — FENTANYL CITRATE 50 UG/ML
25-50 INJECTION, SOLUTION INTRAMUSCULAR; INTRAVENOUS EVERY 5 MIN PRN
Status: CANCELLED | OUTPATIENT
Start: 2023-09-27

## 2023-09-27 RX ORDER — NALOXONE HYDROCHLORIDE 0.4 MG/ML
0.2 INJECTION, SOLUTION INTRAMUSCULAR; INTRAVENOUS; SUBCUTANEOUS
Status: CANCELLED | OUTPATIENT
Start: 2023-09-27

## 2023-09-27 RX ORDER — NALOXONE HYDROCHLORIDE 0.4 MG/ML
0.4 INJECTION, SOLUTION INTRAMUSCULAR; INTRAVENOUS; SUBCUTANEOUS
Status: CANCELLED | OUTPATIENT
Start: 2023-09-27

## 2023-09-27 RX ORDER — ONDANSETRON 2 MG/ML
4 INJECTION INTRAMUSCULAR; INTRAVENOUS
Status: CANCELLED | OUTPATIENT
Start: 2023-09-27

## 2023-09-27 RX ORDER — FLUMAZENIL 0.1 MG/ML
0.2 INJECTION, SOLUTION INTRAVENOUS
Status: CANCELLED | OUTPATIENT
Start: 2023-09-27

## 2023-09-27 RX ADMIN — PRAVASTATIN SODIUM 20 MG: 20 TABLET ORAL at 08:05

## 2023-09-27 RX ADMIN — CEFAZOLIN SODIUM 2 G: 2 INJECTION, SOLUTION INTRAVENOUS at 06:11

## 2023-09-27 RX ADMIN — Medication 81 MG: at 08:06

## 2023-09-27 RX ADMIN — Medication 1 TABLET: at 08:05

## 2023-09-27 RX ADMIN — LIDOCAINE 1 PATCH: 4 PATCH TOPICAL at 14:57

## 2023-09-27 RX ADMIN — MICONAZOLE NITRATE: 20 POWDER TOPICAL at 21:41

## 2023-09-27 RX ADMIN — LOSARTAN POTASSIUM 25 MG: 25 TABLET, FILM COATED ORAL at 08:06

## 2023-09-27 RX ADMIN — SPIRONOLACTONE 12.5 MG: 25 TABLET ORAL at 08:05

## 2023-09-27 RX ADMIN — METHOCARBAMOL 500 MG: 500 TABLET, FILM COATED ORAL at 01:50

## 2023-09-27 RX ADMIN — OXYBUTYNIN CHLORIDE 10 MG: 10 TABLET, EXTENDED RELEASE ORAL at 08:06

## 2023-09-27 RX ADMIN — CEFAZOLIN SODIUM 2 G: 2 INJECTION, SOLUTION INTRAVENOUS at 21:40

## 2023-09-27 RX ADMIN — ACETAMINOPHEN 975 MG: 325 TABLET ORAL at 13:45

## 2023-09-27 RX ADMIN — MICONAZOLE NITRATE: 20 POWDER TOPICAL at 08:06

## 2023-09-27 RX ADMIN — OXYCODONE HYDROCHLORIDE 2.5 MG: 5 TABLET ORAL at 01:50

## 2023-09-27 RX ADMIN — ACETAMINOPHEN 975 MG: 325 TABLET ORAL at 21:41

## 2023-09-27 RX ADMIN — OXYCODONE HYDROCHLORIDE 2.5 MG: 5 TABLET ORAL at 08:23

## 2023-09-27 RX ADMIN — LEVOTHYROXINE SODIUM 50 MCG: 0.03 TABLET ORAL at 06:11

## 2023-09-27 RX ADMIN — POTASSIUM CHLORIDE 20 MEQ: 1500 TABLET, EXTENDED RELEASE ORAL at 08:06

## 2023-09-27 RX ADMIN — BUMETANIDE 2 MG: 2 TABLET ORAL at 08:04

## 2023-09-27 RX ADMIN — ACETAMINOPHEN 975 MG: 325 TABLET ORAL at 08:05

## 2023-09-27 RX ADMIN — OXYCODONE HYDROCHLORIDE 2.5 MG: 5 TABLET ORAL at 21:41

## 2023-09-27 RX ADMIN — CEFAZOLIN SODIUM 2 G: 2 INJECTION, SOLUTION INTRAVENOUS at 13:45

## 2023-09-27 ASSESSMENT — ACTIVITIES OF DAILY LIVING (ADL)
ADLS_ACUITY_SCORE: 39
ADLS_ACUITY_SCORE: 41
ADLS_ACUITY_SCORE: 38
ADLS_ACUITY_SCORE: 41
ADLS_ACUITY_SCORE: 41
ADLS_ACUITY_SCORE: 38
ADLS_ACUITY_SCORE: 39
ADLS_ACUITY_SCORE: 38
ADLS_ACUITY_SCORE: 41

## 2023-09-27 NOTE — PROCEDURES
Neurointerventional Surgery    Procedure: Left C3-4 facet joint aspiration    Radiologist: Ciro Tan MD    Contrast: None  Fluoro Time: 1.8 minutes  Number of images: 2     EBL: Minimal  Specimen: 0.5 ml of serosanguinous fluid mixed with preservative free normal saline  Complications: None    Preliminary findings: (see dictation for full detail)  Technically successful left L3-4 facet joint aspiration.    Assess/Plan:   Routine post procedure monitoring  Bedrest for 1 hour      Ciro Tan MD  Pager: [phone]  Emergency pager: [phone]  Office: [phone]

## 2023-09-27 NOTE — TELEPHONE ENCOUNTER
----- Message from Arabella Hussein RN sent at 9/26/2023  2:12 PM CDT -----  Regarding: RE: follow up    ----- Message -----  From: Raya Gonzalez  Sent: 9/26/2023   2:04 PM CDT  To: Ns Clinical Support Pool  Subject: FW: follow up                                      ----- Message -----  From: Gabriele Orellana PA-C  Sent: 9/26/2023   1:07 PM CDT  To: Spine [facility] Schedulers  Subject: follow up                                        Please arrange follow up in 6-12  weeks.  With Dr. Palacios, discussion regarding cervical stenosis.  Hospital follow-up.      Thank you

## 2023-09-27 NOTE — PLAN OF CARE
Problem: Plan of Care - These are the overarching goals to be used throughout the patient stay.    Goal: Absence of Hospital-Acquired Illness or Injury  Outcome: Progressing  Intervention: Identify and Manage Fall Risk  Recent Flowsheet Documentation  Taken 9/27/2023 0000 by Rodolfo Nicholas RN  Safety Promotion/Fall Prevention:   activity supervised   lighting adjusted   nonskid shoes/slippers when out of bed   room door open   safety round/check completed   toileting scheduled   treat reversible contributory factors  Taken 9/26/2023 2000 by Rodolfo Nicholas RN  Safety Promotion/Fall Prevention:   activity supervised   lighting adjusted   nonskid shoes/slippers when out of bed   room door open   safety round/check completed   toileting scheduled   treat reversible contributory factors  Intervention: Prevent and Manage VTE (Venous Thromboembolism) Risk  Recent Flowsheet Documentation  Taken 9/27/2023 0000 by Rodolfo Nicholas RN  VTE Prevention/Management: (pt on lovenox) SCDs (sequential compression devices) off  Taken 9/26/2023 2000 by Rodolfo Nicholas RN  VTE Prevention/Management: (pt on lovenox) SCDs (sequential compression devices) off     Problem: Infection  Goal: Absence of Infection Signs and Symptoms  Outcome: Progressing     Problem: Pain Acute  Goal: Optimal Pain Control and Function  Outcome: Progressing  Intervention: Develop Pain Management Plan  Recent Flowsheet Documentation  Taken 9/27/2023 0000 by Rodolfo Nicholas RN  Pain Management Interventions:   rest   repositioned  Taken 9/26/2023 2000 by Rodolfo Nicholas RN  Pain Management Interventions: medication (see MAR)  Intervention: Prevent or Manage Pain  Recent Flowsheet Documentation  Taken 9/27/2023 0000 by Rodolfo Nicholas RN  Sensory Stimulation Regulation: television on  Medication Review/Management:   high-risk medications identified   medications reviewed  Taken 9/26/2023 2000 by Rodolfo Nicholas RN  Sensory Stimulation Regulation: television  on  Medication Review/Management:   high-risk medications identified   medications reviewed   Goal Outcome Evaluation:  VSS on RA, A&Ox4, pain as high as 7/10 in neck and given oxy and robaxin with relief. IVF discontinued, potassium replacement for result of 3.2 and was replaced. Periwick in place, regular diet, SBA w/ walker and GB. Has been NPO for bone biopsy since MN. IV-SL. Sleeping between cares.

## 2023-09-27 NOTE — PLAN OF CARE
"  Problem: Plan of Care - These are the overarching goals to be used throughout the patient stay.    Goal: Plan of Care Review  Description: The Plan of Care Review/Shift note should be completed every shift.  The Outcome Evaluation is a brief statement about your assessment that the patient is improving, declining, or no change.  This information will be displayed automatically on your shift note.  Outcome: Progressing   Goal Outcome Evaluation:         Pt A/O x 4 this shift. Peasant and cooperative with all cares and treatments.  Pain comes and goes. Pt states that her pain is \"high when I turn my neck to the left or the right\" . Pt declined Prn Narcotic this shift. But was open to aqua K pad as well as lidocaine patch that was applied. She stated that the lidocaine \"shravan worked, but not really it doesn't make the pain go away when I turn my head\".    Yasmany saw the pt today and stated no surgical intervention recommended at this time, and  recommended evaluation by IR.  Interventional rad saw the pt today- pt will have Left cervical three - four facet joint aspiration/biopsy tomorrow 9/27 around 1pm   Notified oncoming RN that the pt will be NPO after midnight tonight and notified the Oncoming RN Lovenox will be on hold after midnight tonight as well.      Pt has erythema and rash under pannus and breast folds Miconazole pwd was applied  As per order    NS running at 30 ml/hr cont through PIV in left forearm.  Message was left with MD if the fluids are still necessary for this pt   Pt eating well 100% of meals, but states that she \"just doesn't feel hungry\".    Pt ambulating to bathroom with gait belt and assist of 1 staff. Up to chair several times this shift.    K+ level this afternoon was 3.2- notifying the oncoming RN to notify MD if any further orders are needed.     "

## 2023-09-27 NOTE — PROGRESS NOTES
[facility] Inpatient follow up       Patient:  Makayla Dow  Date of birth 1940, Medical record number [id number]  Date of Visit:  09/27/2023  Attending Physician: Shanda Barrow MD         Assessment and Recommendations:   Assessment:  Makayla Dow is a 82 year old female with   Status post bilateral TKA.  Some mild pain bilaterally worse in the left prosthetic knee.  Acute hypoxic respiratory failure.  Resolved.  MSSA bacteremia.  Positive blood culture on 9/19/2023, 2 out of 2 sets.  Follow-up blood culture on 9/21, 9/22, 9/23 in process.  So far no growth.  Has superficial excoriations on the left ankle that could be report of entry.  Mild pain in both prosthetic knees worse on the left side.  MRI of the left prosthetic knee negative for septic arthritis.  TTE on 9/20/2023 followed by RAFAEL with no evidence of infective endocarditis.  Changed to IV Ancef on 9/21/2023.   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  On IV Ancef. Input from neurosurgery is appreciated. No surgery at this point.   Allergy to ampicillin.  Reaction rash.     Recommendations:   Continue IV Ancef  Aspiration of the C3-C4 facet joint fluid collection.  Will need at least 6 weeks of IV Ancef outpatient  Monitor for rash    Discussed with the patient, nursing staff     ID will follow     Cash Tse MD  [facility].   [facility]  Office Telephone [phone].  Fax [phone]  [facility] paging            Interval History:     HPI: Neck pain better today. On the schedule for aspiration today at 1 pm.     Recent Inflammatory Biomarkers:   Recent Labs   Lab Test 09/26/23  1226 09/21/23  0750 09/20/23  0745 09/19/23  1715 09/19/23  1517 08/27/20  1600 08/06/18  1437   PCAL  --   --   --  0.88*  --   --   --    WBC 9.0 10.3 13.0*  --  14.2* 6.4 4.8              Review of  Systems:   CONSTITUTIONAL:    Temp Max: Temp (24hrs), Av.4  F (36.9  C), Min:98.2  F (36.8  C), Max:98.6  F (37  C)   .  Negative except for findings in the HPI.           Current Medications (antimicrobials listed in bold):      acetaminophen  975 mg Oral TID    Or    acetaminophen  650 mg Rectal TID    aspirin  81 mg Oral Daily    bumetanide  2 mg Oral Daily    ceFAZolin  2 g Intravenous Q8H    [Held by provider] enoxaparin ANTICOAGULANT  40 mg Subcutaneous Q12H    levothyroxine  50 mcg Oral Daily    lidocaine  1 patch Transdermal Q24H DARWIN    losartan  25 mg Oral Daily    miconazole   Topical BID    multivitamin w/minerals  1 tablet Oral Daily    oxyBUTYnin ER  10 mg Oral Daily    pravastatin  20 mg Oral Daily    sodium chloride (PF)  3 mL Intracatheter Q8H    spironolactone  12.5 mg Oral Daily              Allergies:     Allergies   Allergen Reactions    Hydroxychloroquine Itching    Ampicillin Rash    Naproxen Itching            Physical Exam:   Vitals were reviewed  Patient Vitals for the past 24 hrs:   BP Temp Temp src Pulse Resp SpO2 Weight   23 0728 (!) 167/64 98.3  F (36.8  C) Oral 74 18 90 % --   23 0500 -- -- -- -- -- -- 117.2 kg (258 lb 6.1 oz)   23 2331 (!) 140/62 98.5  F (36.9  C) Oral 69 18 90 % --   23 1537 119/56 98.9  F (37.2  C) Oral 68 20 90 % --         Physical Examination:  Gen: Pleasant in no acute distress.  HEENT: NCAT. EOMI. PERRL.  Neck: Tenderness on palpation.   Lungs: Clear to ascultation bilat with no crackles or wheezes.  Card: RRR. NSR. No RMG. Peripheral pulses present and symmetric. No edema.  Abd: Soft NT ND. No mass. Normal bowel sounds.  Skin: No rash.  Extr: Left prosthetic knee pain on palpation.  No clear evidence of infection.  Neuro: Alert and oriented to place time and person.        Laboratory Data:   ID Labs:  Microbiology labs:  Blood clearing   Blood Culture Peripheral Blood  Order: [id number]  Collected 2023  5:15 PM        Status: Final result       Visible to patient: No (inaccessible in MyChart)    Specimen Information: Peripheral Blood   0 Result Notes  Culture Positive on the 1st day of incubation Abnormal       Staphylococcus aureus Panic    1 of 2 bottles        Resulting Agency: [facility]     Susceptibility     Staphylococcus aureus     JENNIFER     Clindamycin 0.25 ug/mL Susceptible     Daptomycin 0.25 ug/mL Susceptible     Doxycycline <=0.5 ug/mL Susceptible     Erythromycin <=0.25 ug/mL Susceptible     Gentamicin <=0.5 ug/mL Susceptible     Oxacillin 0.5 ug/mL Susceptible 1     Tetracycline <=1 ug/mL Susceptible     Trimethoprim/Sulfamethoxazole <=0.5/9.5 u... Susceptible     Vancomycin 1 ug/mL Susceptible              1 Oxacillin susceptible isolates are susceptible to cephalosporins (example: cefazolin and cephalexin) and beta lactam combination agents. Oxacillin resistant isolates are resistant to these agents.            Specimen Collected: 09/19/23  5:15 PM Last Resulted: 09/22/23  7:22 AM           No lab results found.  Recent Labs   Lab Test 09/26/23  1226 09/21/23  0750 09/20/23  0745 09/19/23  1517 08/27/20  1600 08/06/18  1437   WBC 9.0 10.3 13.0* 14.2* 6.4 4.8       Recent Labs   Lab Test 09/27/23  0723 09/26/23 1226 09/23/23  0650 09/22/23  0633   CR 0.92 1.19* 0.88  0.87 0.94   GFRESTIMATED 61 45* 64  65 60*         Hematology Studies  Recent Labs   Lab Test 09/26/23  1226 09/25/23  0744 09/22/23  0633 09/21/23  0750 09/20/23  0745 09/19/23  1517 08/27/20  1600 08/06/18  1437   WBC 9.0  --   --  10.3 13.0* 14.2* 6.4 4.8   HCT 35.6  --   --  36.6 35.7 35.6 43.1 40.7    235 202 212 177 193 215 227         Metabolic  Recent Labs   Lab Test 09/27/23  0723 09/26/23  1226 09/23/23  0650    135 139   BUN 26.7* 22.1 19.1   CO2 33* 32* 31*   CR 0.92 1.19* 0.88  0.87   GFRESTIMATED 61 45* 64  65         Hepatic Studies  Recent Labs   Lab Test 08/06/18  1437 02/12/18  1012   BILITOTAL 0.4 0.8   ALKPHOS 105 104    ALBUMIN 3.5 3.4*   AST 24 24   ALT 18 19         ImmunologlobulinsNo lab results found.         Imaging Data:   Reviewed   Study Result    Narrative & Impression   EXAM: MR CERVICAL SPINE W/O and W CONTRAST  LOCATION: [facility]  DATE: 9/25/2023     INDICATION: Neck pain in a patient with MSSA bacteremia of unclear source; fever and chills  COMPARISON: None.  CONTRAST: 10 mL Gadavist  TECHNIQUE: MRI Cervical Spine without and with IV contrast.     FINDINGS:   Normal vertebral body heights. Very mild marrow edema at the left C3-C4 facet. Otherwise, no suspicious marrow signal. Minimal retrolisthesis of C3 on C4 and C5 on C6. Minimal anterolisthesis of C7 on T1 and T1 on T2. Osseous fusion of the right C4-C5   facets. No abnormal cord signal. Epidural enhancement eccentrically to the left and posteriorly at C3-C4.       Major vascular flow voids in the neck are maintained.     Craniovertebral junction and C1-C2: Widely patent.     C2-C3: No significant canal or foraminal stenosis.      C3-C4: Broad-based disc and osteophyte. Bilateral facet arthropathy, right greater than left. Enhancement surrounding the left facet joint with trace fluid in the joint space. Mild canal stenosis. Mild right and severe left foraminal stenosis.      C4-C5: Broad-based disc and osteophyte. Bilateral facet arthropathy. Ventral cord flattening with mild canal stenosis. Mild right foraminal stenosis.     C5-C6: Broad-based disc and osteophyte. Bilateral uncovertebral spurring. Bilateral facet arthropathy. Ventral cord flattening with moderate canal stenosis. Severe bilateral foraminal stenosis.     C6-C7: Broad-based disc and osteophyte. Bilateral facet arthropathy. Moderate canal stenosis. Severe bilateral foraminal stenosis.      C7-T1: No significant canal or foraminal stenosis.                                                                      IMPRESSION:  1.  Fluid in the left C3-C4 facet joint with  surrounding inflammatory change. Septic arthritis is a primary consideration as the degree of inflammation is is greater than expected for degenerative arthropathy.  2.  Epidural enhancement at C3-C4 could reflect reactive change versus early phlegmon formation. No epidural abscess/drainable collection.  3.  Multilevel degenerative changes with multilevel cord flattening and moderate canal stenosis at C5-C6 and C6-C7.  4.  No abnormal cord signal.

## 2023-09-27 NOTE — PROGRESS NOTES
"  Neurointerventional Surgery:  Pre Sedation Assessment ~    We are asked to perform a cervical biopsy on this 82 year old female with concern for septic arthritis.    Imaging: MRI cervical spine ~  1.  Fluid in the left C3-C4 facet joint with surrounding inflammatory change. Septic arthritis is a primary consideration as the degree of inflammation is greater than expected for degenerative arthropathy.  2.  Epidural enhancement at C3-C4 could reflect reactive change versus early phlegmon formation. No epidural abscess/drainable collection.  3.  Multilevel degenerative changes with multilevel cord flattening and moderate canal stenosis at C5-C6 and C6-C7.  4.  No abnormal cord signal.       Allergies   Allergen Reactions    Hydroxychloroquine Itching    Ampicillin Rash    Naproxen Itching       Labs: Hgb 11.3, plt 270, INR 1.14    Sedation history: Previous problems with sedation. No   History of sleep apnea No    Exam:   BP (!) 167/64 (BP Location: Right arm)   Pulse 74   Temp 98.3  F (36.8  C) (Oral)   Resp 18   Ht 1.575 m (5' 2\")   Wt 117.2 kg (258 lb 6.1 oz)   SpO2 90%   BMI 47.26 kg/m    Neuro: Alert and oriented x 3. Speech is clear.  Cardiac: S1 S2, no murmur  Lungs: clear  Mallampati:  II - Faucial pillars and soft palate may be seen, but uvula is masked by the base of the tongue  ASA: 2 - Mild systemic disease  NPO since midnight    Impression: This is an 82 year old female with MSSA bacteremia (positive blood cultures on 9/19). She developed new neck pain roughly 3 days ago with MR imaging of cervical spine demonstrating fluid in the left C3-C4 facet joint with surrounding inflammatory changes. Biopsy/aspiration has been requested to evaluate for septic arthritis. The patient is medically stable and appropriately NPO for procedure as planned.  Lovenox on hold    Plan: Left cervical three-four facet joint aspiration for cultures with moderate IV sedation  -Scheduled this afternoon in IR around " 1300    The procedure its risks, benefits, and alternatives were discussed with the patient. She expresses an understanding of the procedure and provides permission for us to proceed. Informed consent obtained and signed.     Total time spent 25 minutes  15 minutes in exam, counseling and obtaining consent  10 in chart review and coordination of care     BECKY [facility]  Office: [phone]

## 2023-09-27 NOTE — PLAN OF CARE
Problem: Plan of Care - These are the overarching goals to be used throughout the patient stay.    Goal: Optimal Comfort and Wellbeing  Intervention: Monitor Pain and Promote Comfort  Recent Flowsheet Documentation  Taken 9/27/2023 0808 by Janae Xiong RN  Pain Management Interventions:   medication (see MAR)   emotional support     Problem: Infection  Goal: Absence of Infection Signs and Symptoms  Outcome: Progressing     Problem: Plan of Care - These are the overarching goals to be used throughout the patient stay.    Goal: Plan of Care Review  Description: The Plan of Care Review/Shift note should be completed every shift.  The Outcome Evaluation is a brief statement about your assessment that the patient is improving, declining, or no change.  This information will be displayed automatically on your shift note.  Outcome: Progressing  Flowsheets (Taken 9/27/2023 1014)  Plan of Care Reviewed With: patient  Overall Patient Progress: improving   Goal Outcome Evaluation:      Plan of Care Reviewed With: patient    Overall Patient Progress: improvingOverall Patient Progress: improving       Pt continues to c/o pain in her lower neck. Left side is worse. Aspiration done. One dose of Oxy given this AM and not lidocaine patch on neck States the pain is worse when turning her head side to side. Purewick when in bed otherwise up to BR. Walking in halls and sitting up in chair.

## 2023-09-28 ENCOUNTER — APPOINTMENT (OUTPATIENT)
Dept: ULTRASOUND IMAGING | Facility: HOSPITAL | Age: 83
DRG: 871 | End: 2023-09-28
Attending: STUDENT IN AN ORGANIZED HEALTH CARE EDUCATION/TRAINING PROGRAM
Payer: COMMERCIAL

## 2023-09-28 ENCOUNTER — TELEPHONE (OUTPATIENT)
Dept: INFECTIOUS DISEASES | Facility: CLINIC | Age: 83
End: 2023-09-28

## 2023-09-28 LAB
BACTERIA SPEC CULT: NO GROWTH
FERRITIN SERPL-MCNC: 509 NG/ML (ref 11–328)
IRON BINDING CAPACITY (ROCHE): 187 UG/DL (ref 240–430)
IRON SATN MFR SERPL: 17 % (ref 15–46)
IRON SERPL-MCNC: 32 UG/DL (ref 37–145)
NT-PROBNP SERPL-MCNC: 318 PG/ML (ref 0–1800)
RETICS # AUTO: 0.07 10E6/UL (ref 0.01–0.11)
RETICS/RBC NFR AUTO: 1.9 % (ref 0.8–2.7)
VIT B12 SERPL-MCNC: 1822 PG/ML (ref 232–1245)

## 2023-09-28 PROCEDURE — 250N000011 HC RX IP 250 OP 636: Mod: JZ | Performed by: STUDENT IN AN ORGANIZED HEALTH CARE EDUCATION/TRAINING PROGRAM

## 2023-09-28 PROCEDURE — 250N000009 HC RX 250: Performed by: STUDENT IN AN ORGANIZED HEALTH CARE EDUCATION/TRAINING PROGRAM

## 2023-09-28 PROCEDURE — 250N000013 HC RX MED GY IP 250 OP 250 PS 637: Performed by: STUDENT IN AN ORGANIZED HEALTH CARE EDUCATION/TRAINING PROGRAM

## 2023-09-28 PROCEDURE — 36415 COLL VENOUS BLD VENIPUNCTURE: CPT | Performed by: STUDENT IN AN ORGANIZED HEALTH CARE EDUCATION/TRAINING PROGRAM

## 2023-09-28 PROCEDURE — 120N000001 HC R&B MED SURG/OB

## 2023-09-28 PROCEDURE — 99232 SBSQ HOSP IP/OBS MODERATE 35: CPT | Performed by: STUDENT IN AN ORGANIZED HEALTH CARE EDUCATION/TRAINING PROGRAM

## 2023-09-28 PROCEDURE — 250N000013 HC RX MED GY IP 250 OP 250 PS 637: Performed by: INTERNAL MEDICINE

## 2023-09-28 PROCEDURE — 82607 VITAMIN B-12: CPT | Performed by: STUDENT IN AN ORGANIZED HEALTH CARE EDUCATION/TRAINING PROGRAM

## 2023-09-28 PROCEDURE — 82728 ASSAY OF FERRITIN: CPT | Performed by: STUDENT IN AN ORGANIZED HEALTH CARE EDUCATION/TRAINING PROGRAM

## 2023-09-28 PROCEDURE — 85045 AUTOMATED RETICULOCYTE COUNT: CPT | Performed by: STUDENT IN AN ORGANIZED HEALTH CARE EDUCATION/TRAINING PROGRAM

## 2023-09-28 PROCEDURE — 272N000450 HC KIT 4FR POWER PICC SINGLE LUMEN

## 2023-09-28 PROCEDURE — 83550 IRON BINDING TEST: CPT | Performed by: STUDENT IN AN ORGANIZED HEALTH CARE EDUCATION/TRAINING PROGRAM

## 2023-09-28 PROCEDURE — 99233 SBSQ HOSP IP/OBS HIGH 50: CPT | Performed by: STUDENT IN AN ORGANIZED HEALTH CARE EDUCATION/TRAINING PROGRAM

## 2023-09-28 PROCEDURE — 83880 ASSAY OF NATRIURETIC PEPTIDE: CPT | Performed by: STUDENT IN AN ORGANIZED HEALTH CARE EDUCATION/TRAINING PROGRAM

## 2023-09-28 PROCEDURE — 76705 ECHO EXAM OF ABDOMEN: CPT

## 2023-09-28 PROCEDURE — 36569 INSJ PICC 5 YR+ W/O IMAGING: CPT

## 2023-09-28 RX ORDER — CEFAZOLIN SODIUM 2 G/100ML
2 INJECTION, SOLUTION INTRAVENOUS EVERY 8 HOURS
Qty: 12600 ML | Refills: 0 | Status: SHIPPED | OUTPATIENT
Start: 2023-09-28 | End: 2023-11-09

## 2023-09-28 RX ORDER — LIDOCAINE 40 MG/G
CREAM TOPICAL
Status: DISCONTINUED | OUTPATIENT
Start: 2023-09-28 | End: 2023-09-29 | Stop reason: HOSPADM

## 2023-09-28 RX ADMIN — Medication 81 MG: at 09:57

## 2023-09-28 RX ADMIN — OXYBUTYNIN CHLORIDE 10 MG: 10 TABLET, EXTENDED RELEASE ORAL at 09:56

## 2023-09-28 RX ADMIN — ACETAMINOPHEN 975 MG: 325 TABLET ORAL at 09:56

## 2023-09-28 RX ADMIN — LIDOCAINE HYDROCHLORIDE 2 ML: 10 INJECTION, SOLUTION EPIDURAL; INFILTRATION; INTRACAUDAL; PERINEURAL at 14:00

## 2023-09-28 RX ADMIN — OXYCODONE HYDROCHLORIDE 2.5 MG: 5 TABLET ORAL at 13:18

## 2023-09-28 RX ADMIN — PRAVASTATIN SODIUM 20 MG: 20 TABLET ORAL at 09:57

## 2023-09-28 RX ADMIN — ACETAMINOPHEN 975 MG: 325 TABLET ORAL at 13:18

## 2023-09-28 RX ADMIN — METHOCARBAMOL 500 MG: 500 TABLET, FILM COATED ORAL at 09:56

## 2023-09-28 RX ADMIN — SPIRONOLACTONE 12.5 MG: 25 TABLET ORAL at 09:56

## 2023-09-28 RX ADMIN — ENOXAPARIN SODIUM 40 MG: 40 INJECTION SUBCUTANEOUS at 20:46

## 2023-09-28 RX ADMIN — CEFAZOLIN SODIUM 2 G: 2 INJECTION, SOLUTION INTRAVENOUS at 21:05

## 2023-09-28 RX ADMIN — LOSARTAN POTASSIUM 25 MG: 25 TABLET, FILM COATED ORAL at 09:57

## 2023-09-28 RX ADMIN — CEFAZOLIN SODIUM 2 G: 2 INJECTION, SOLUTION INTRAVENOUS at 06:44

## 2023-09-28 RX ADMIN — MICONAZOLE NITRATE 1 APPLICATOR: 20 POWDER TOPICAL at 09:58

## 2023-09-28 RX ADMIN — Medication 1 TABLET: at 09:57

## 2023-09-28 RX ADMIN — BUMETANIDE 2 MG: 2 TABLET ORAL at 09:56

## 2023-09-28 RX ADMIN — ACETAMINOPHEN 975 MG: 325 TABLET ORAL at 20:45

## 2023-09-28 RX ADMIN — CEFAZOLIN SODIUM 2 G: 2 INJECTION, SOLUTION INTRAVENOUS at 13:19

## 2023-09-28 RX ADMIN — LEVOTHYROXINE SODIUM 50 MCG: 0.03 TABLET ORAL at 06:44

## 2023-09-28 RX ADMIN — MICONAZOLE NITRATE: 20 POWDER TOPICAL at 20:46

## 2023-09-28 RX ADMIN — OXYCODONE HYDROCHLORIDE 2.5 MG: 5 TABLET ORAL at 06:44

## 2023-09-28 RX ADMIN — LIDOCAINE 1 PATCH: 4 PATCH TOPICAL at 13:19

## 2023-09-28 ASSESSMENT — ACTIVITIES OF DAILY LIVING (ADL)
ADLS_ACUITY_SCORE: 34
ADLS_ACUITY_SCORE: 34
ADLS_ACUITY_SCORE: 38
ADLS_ACUITY_SCORE: 37
ADLS_ACUITY_SCORE: 38

## 2023-09-28 NOTE — PROCEDURES
"PICC Line Insertion Procedure Note  Pt. Name: Makayla Dow  MRN:        [id number]    Procedure: Insertion of a  Single Lumen  4 fr  Bard SOLO (valved) Power PICC, Lot number [id number]    Indications: Antibiotics ~ 6wks    Contraindications : none    Procedure Details     Patient identified with 2 identifiers and \"Time Out\" conducted.  .     Central line insertion bundle followed: hand hygiene performed prior to procedure, site cleansed with cholraprep, hat, mask, sterile gloves, sterile gown worn, patient draped with maximum barrier head to toe drape, sterile field maintained.    The vein was assessed and found to be compressible and of adequate size.     Lidocaine 1% 2 ml administered sq to the insertion site. A 4 Fr PICC was inserted into the basilic vein of the right arm with ultrasound guidance. 1 attempt(s) required to access vein.   Catheter threaded without difficulty. Good blood return noted.    Modified Seldinger Technique used for insertion.    The 8 sharps that are included in the PICC insertion kit were accounted for and disposed of in the sharps container prior to breakdown of the sterile field.    Catheter secured with Statlock, biopatch and Tegaderm dressing applied.    Findings:    Total catheter length  38 cm, with 0 cm exposed. Mid upper arm circumference is 35 cm. Catheter was flushed with 10 cc NS. Patient  tolerated procedure well.    Tip placement verified by [employer] and blood return      CLABSI prevention brochure left at bedside.    Patient's primary RN notified PICC is ready for use.      Comments:          [facility] [place]        "

## 2023-09-28 NOTE — PROGRESS NOTES
[facility]    Medicine Progress Note - Hospitalist Service    Date of Admission:  9/19/2023    Assessment & Plan   Assessment:  Makayla Dow is a 82 year old female admitted on 9/19/2023. She presented to the ER with fever, chest discomfort of 1 day duration.  She was found to have oxygen saturation in the high 80s.  Past medical history significant for HTN, Hypothyroidism, ELIZABETH (non compliant with CPAP)    Problem list and plan:  Acute hypoxic respiratory failure secondary to HFpEF resolved  Cardiogenic pulmonary edema 2/2 HFpEF resolved  Metabolic alkalosis 2/2 diuresis  Patient presented to the ER with fever, chest discomfort of 1 day duration.  She was found to have oxygen saturation in the high 80s  CT chest PE study negative for pulmonary embolism, some evidence of pulmonary hypertension, atelectasis and small airway disease/gas trapping.  Echo: Normal left ventricular size and systolic performance with a visually, estimated ejection fraction of 60%. Mild biatrial enlargement  BNP up trended  Cardiology following  Currently on goal-directed medical therapy for CHF on losartan 25 mg daily, spironolactone 12.5 mg daily and Bumex 2 mg oral daily  Was initially on IV Lasix now switched to oral bumex 2 mg oral daily  Currently saturating in the low to mid 90s On 1 liter of oxygen ( not on home oxygen) wean off oxygen as tolerated  Respiratory viral panel negative for COVID, flu, respiratory syncytial virus  Uptrending inflammatory markers(CRP), continue to trend  PT evaluated and recommended home with assist home with home care physical therapy but as patient would need 6 weeks of IV antibiotics suspecting will need to be discharged to TCU, this was discussed with both patient and case management team.    Mild LAURE resolved  Renal function trended up to 1.19 now back to 0.92  Monitor renal function  Avoid nephrotoxic meds and renally dose all meds    Electrolyte  abnormality/hypokalemia  Repleted and resolved    Sepsis secondary to MSSA Bacteremia  Leukocytosis secondary to above resolved  Acute neck pain resolving  ID consulted  MSSA bacteremia 9/19 - 2/2 sets  Procalcitonin 0.88  Repeat blood cultures 9/21, 9/22, 9/23 showing no growth to date  RAFAEL 9/25 : Limited views showed no vegetations  MRI left knee 9/22 post op changes  MRI cervical spine 9/25: Fluid in the left C3-C4 facet joint with surrounding inflammatory change. Septic arthritis is a primary consideration as the degree of inflammation is is greater than expected for degenerative arthropathy. Epidural enhancement at C3-C4 could reflect reactive change versus early phlegmon formation. No epidural abscess/drainable collection. Multilevel degenerative changes with multilevel cord flattening and moderate canal stenosis at C5-C6 and C6-C7.4.  No abnormal cord signal.  Consulted neurosurgery: No surgical intervention recommended at this time, recommend evaluation by IR if aspiration of left C3-4 joint is possible to obtain cultures, consult order placed. Soft collar can be provided for comfort as needed. Recommend outpatient follow-up in neurosurgery clinic for evaluation of her cervical stenosis and discussion of symptoms of myelopathy as a nonurgent visit once her infectious symptoms have resolved. Neurosurgery clinic will help arrange this visit. Neurosurgery signed off.  9/27 Consulted IR. S/p Left cervical three-four facet joint aspiration.  Specimen sent for further analysis  On iv ancef - started 9/21 - for MSSA bacteremia 9/19, discontinued iv vancomycin  Follow synovial cultures sent 9/27/2023, ID following  Will need at least 6 weeks of IV Ancef outpatient   Monitor fever and WBC curve  As per ID patient can be discharged on IV Ancef with weekly CBC with differential, BMP, CRP, total duration of 6 weeks, PICC line order placed, follow-up in ID clinic in 3 to 4 weeks which has been arranged by ID, monitor for  rash.  Case management working on placement  S/p PICC line placement 9/28/2023    Normocytic anemia  Monitored hemoglobin currently stable  Follow-up iron panel    Dilated gallbladder with mild pericholecystic fat stranding on imaging  Mildly dilated gallbladder with mild pericholecystic fat stranding suggesting cholecystitis. Small gallstones.   Patient does not have any abdominal pain on physical examination and Chin sign was negative, suspecting dilated gallbladder from congestion in the setting of CHF, will follow-up with liver sono as CHF is improved.  If suspicion becomes high will do HIDA scan and have surgery see patient.  Serial abdominal examination.    Elevated troponin most likely setting of type II NSTEMI/demand ischemia  Chest discomfort, resolved  Initial troponin was elevated at 36 followed by 37  Downtrended to 29  Could not appreciate any ischemic changes on EKG  RAFAEL : no wall motion changes  9/22/2023 Stress EKG is negative for inducible myocardial ischemia, on stress test there is no reversible change on stress and rest images indicating inducible myocardial ischemia, normal left ventricular size, wall motion, systolic function, the calculated left ventricular ejection fraction 70%, the patient is low risk of future cardiac ischemic events.    Pancreatic lesion vs mass  Exophytic density off the distal pancreatic tail; indeterminate for exophytic pancreatic parenchyma versus lesion. Recommend dedicated CT pancreas imaging   CT Pancreas showed tortuous pancreatic tail accounting for the exophytic appearance on prior chest CT. No pancreatic masses.      History of hypothyroidism  TSH 2.98  Continue with levothyroxine 50 mcg oral daily    History of hyperlipidemia  Prediabetes  Continue with pravastatin 20 daily  Hemoglobin A1c 5.9    Morbid obesity  BMI: 47    Imaging finding  Large hiatal hernia - follow up with surgery as outpatient  7mm right lung base nodule - Follow up with PMD/ Pulm  "outpatient    Discharge barriers: Not medically ready for discharge at this time. Follow cultures from from aspiration from cervical spine, antibiotic plan       Diet: Regular Diet Adult    DVT Prophylaxis: Pneumatic Compression Devices  Wise Catheter: Not present  Lines: PRESENT      PICC 09/28/23 Single Lumen Right Basilic-Site Assessment: WDL      Cardiac Monitoring: None  Code Status: Full Code      Clinically Significant Risk Factors                         # Severe Obesity: Estimated body mass index is 47.26 kg/m  as calculated from the following:    Height as of this encounter: 1.575 m (5' 2\").    Weight as of this encounter: 117.2 kg (258 lb 6.1 oz).   # Moderate Malnutrition: based on nutrition assessment           Disposition Plan      Expected Discharge Date: 09/29/2023    Discharge Delays: IV Medication - consider oral or Home Infusion  Destination: home with family  Discharge Comments: cultures/IV antibiotics  ID following  daily blood cutlures  infusion center          Saad J. Gondal, MD  Hospitalist Service  [facility]  Securely message with [employer] (more info)  Text page via [employer] Paging/Directory   ______________________________________________________________________    Interval History   Patient was seen and examined at bedside, patient denied any acute overnight concerns or complaints, discussed further plan with the patient at bedside.    Physical Exam   Vital Signs: Temp: 98.3  F (36.8  C) Temp src: Oral BP: 109/50 Pulse: 78   Resp: 16 SpO2: 92 % O2 Device: None (Room air) Oxygen Delivery: 1 LPM  Weight: 258 lbs 6.07 oz    GENERAL: Patient was seen and examined at bedside with no acute distress  HENT:  Head is normocephalic, atraumatic.  Decreased range of motion of the neck.  EYES:  Eyes have anicteric sclerae without conjunctival injection   LUNGS: Bilateral air entry, coarse breath sounds bilaterally, decreased breath sounds at the bilateral bases  CARDIOVASCULAR:  " Regular rate and rhythm with no murmurs, gallops or rubs.  ABDOMEN:  Normal bowel sounds. Soft; nontender   EXT: Chronic venous stasis skin changes on bilateral legs, no edema appreciated  SKIN:  No acute rashes.   NEUROLOGIC:  Grossly nonfocal.      Medical Decision Making       50 MINUTES SPENT BY ME on the date of service doing chart review, history, exam, documentation & further activities per the note.      Data         Imaging results reviewed over the past 24 hrs:   No results found for this or any previous visit (from the past 24 hour(s)).

## 2023-09-28 NOTE — PROGRESS NOTES
[facility] ID Inpatient follow up       Patient:  Makayla Dow  Date of birth 1940, Medical record number [id number]  Date of Visit:  09/28/2023  Attending Physician: Shanda Barrow MD         Assessment and Recommendations:   Assessment:  Makayla Dow is a 82 year old female with   Status post bilateral TKA.  Some mild pain bilaterally worse in the left prosthetic knee.  Acute hypoxic respiratory failure.  Resolved.  MSSA bacteremia.  Positive blood culture on 9/19/2023, 2 out of 2 sets.  Follow-up blood culture on 9/21, 9/22, 9/23 in process.  So far no growth.  Has superficial excoriations on the left ankle that could be report of entry.  Mild pain in both prosthetic knees worse on the left side.  MRI of the left prosthetic knee negative for septic arthritis.  TTE on 9/20/2023 followed by RAFAEL with no evidence of infective endocarditis.  Changed to IV Ancef on 9/21/2023.   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  On IV Ancef. Input from neurosurgery is appreciated. No surgery at this point.  Status post aspiration on 9/27/2023.  Cultures in process.  Allergy to ampicillin.  Reaction rash.  Tolerating IV Ancef well with no rash.     Recommendations:   Can discharge on IV Ancef with weekly CBC with differential, BMP, CRP.  Duration 6 weeks.  Single-lumen PICC line.  Orders placed.  ID clinic follow-up in 3 to 4 weeks.  Arranged.  Monitor for rash    Discussed with the patient, nursing staff, care management.    ID will sign off.    Cash Tse MD  [facility].   [facility]  Office Telephone [phone].  Fax [phone]  [facility] paging            Interval History:     HPI: Neck pain is improving.  Aspiration was done yesterday.  Cultures in process.    Recent Inflammatory Biomarkers:   Recent Labs   Lab Test 09/26/23  1226 09/21/23  0750  23  0745 23  1715 23  1517 20  1600 18  1437   PCAL  --   --   --  0.88*  --   --   --    WBC 9.0 10.3 13.0*  --  14.2* 6.4 4.8              Review of Systems:   CONSTITUTIONAL:    Temp Max: Temp (24hrs), Av.4  F (36.9  C), Min:98.2  F (36.8  C), Max:98.6  F (37  C)   .  Negative except for findings in the HPI.           Current Medications (antimicrobials listed in bold):      acetaminophen  975 mg Oral TID    Or    acetaminophen  650 mg Rectal TID    aspirin  81 mg Oral Daily    bumetanide  2 mg Oral Daily    ceFAZolin  2 g Intravenous Q8H    [Held by provider] enoxaparin ANTICOAGULANT  40 mg Subcutaneous Q12H    levothyroxine  50 mcg Oral Daily    lidocaine  1 patch Transdermal Q24H DARWIN    losartan  25 mg Oral Daily    miconazole   Topical BID    multivitamin w/minerals  1 tablet Oral Daily    oxyBUTYnin ER  10 mg Oral Daily    pravastatin  20 mg Oral Daily    sodium chloride (PF)  3 mL Intracatheter Q8H    spironolactone  12.5 mg Oral Daily              Allergies:     Allergies   Allergen Reactions    Hydroxychloroquine Itching    Ampicillin Rash    Naproxen Itching            Physical Exam:   Vitals were reviewed  Patient Vitals for the past 24 hrs:   BP Temp Temp src Pulse Resp SpO2   23 0724 126/62 98.5  F (36.9  C) Oral 75 16 92 %   23 2322 119/55 98.1  F (36.7  C) Oral 77 16 92 %   23 2153 (!) 158/65 98.2  F (36.8  C) Oral 81 16 98 %   23 2026 104/55 -- -- 62 16 99 %   23 1533 133/63 98  F (36.7  C) Oral 69 18 98 %   23 1340 -- -- -- -- -- 94 %   23 1339 135/64 98.9  F (37.2  C) Oral 76 18 97 %   23 1300 -- -- -- -- -- 93 %   09/27/23 1107 109/51 99.5  F (37.5  C) Oral 84 18 90 %         Physical Examination:  Gen: Pleasant in no acute distress.  HEENT: NCAT. EOMI. PERRL.  Neck: Tenderness on palpation.   Lungs: Clear to ascultation bilat with no crackles or wheezes.  Card: RRR. NSR. No RMG. Peripheral pulses present and  symmetric. No edema.  Abd: Soft NT ND. No mass. Normal bowel sounds.  Skin: No rash.  Extr: Left prosthetic knee pain on palpation.  No clear evidence of infection.  Neuro: Alert and oriented to place time and person.        Laboratory Data:   ID Labs:  Microbiology labs:  Blood clearing 9/21, 22, 23  Blood Culture Peripheral Blood  Order: [id number]  Collected 9/19/2023  5:15 PM       Status: Final result       Visible to patient: No (inaccessible in MyChart)    Specimen Information: Peripheral Blood   0 Result Notes  Culture Positive on the 1st day of incubation Abnormal       Staphylococcus aureus Panic    1 of 2 bottles        Resulting Agency: [facility]     Susceptibility     Staphylococcus aureus     JENNIFER     Clindamycin 0.25 ug/mL Susceptible     Daptomycin 0.25 ug/mL Susceptible     Doxycycline <=0.5 ug/mL Susceptible     Erythromycin <=0.25 ug/mL Susceptible     Gentamicin <=0.5 ug/mL Susceptible     Oxacillin 0.5 ug/mL Susceptible 1     Tetracycline <=1 ug/mL Susceptible     Trimethoprim/Sulfamethoxazole <=0.5/9.5 u... Susceptible     Vancomycin 1 ug/mL Susceptible              1 Oxacillin susceptible isolates are susceptible to cephalosporins (example: cefazolin and cephalexin) and beta lactam combination agents. Oxacillin resistant isolates are resistant to these agents.            Specimen Collected: 09/19/23  5:15 PM Last Resulted: 09/22/23  7:22 AM           No lab results found.  Recent Labs   Lab Test 09/26/23  1226 09/21/23  0750 09/20/23  0745 09/19/23  1517 08/27/20  1600 08/06/18  1437   WBC 9.0 10.3 13.0* 14.2* 6.4 4.8       Recent Labs   Lab Test 09/27/23  0723 09/26/23  1226 09/23/23  0650 09/22/23  0633   CR 0.92 1.19* 0.88  0.87 0.94   GFRESTIMATED 61 45* 64  65 60*         Hematology Studies  Recent Labs   Lab Test 09/26/23  1226 09/25/23  0744 09/22/23  0633 09/21/23  0750 09/20/23  0745 09/19/23  1517 08/27/20  1600 08/06/18  1437   WBC 9.0  --   --  10.3 13.0* 14.2* 6.4 4.8   HCT 35.6  --    --  36.6 35.7 35.6 43.1 40.7    235 202 212 177 193 215 227         Metabolic  Recent Labs   Lab Test 09/27/23  0723 09/26/23  1226 09/23/23  0650    135 139   BUN 26.7* 22.1 19.1   CO2 33* 32* 31*   CR 0.92 1.19* 0.88  0.87   GFRESTIMATED 61 45* 64  65         Hepatic Studies  Recent Labs   Lab Test 08/06/18  1437 02/12/18  1012   BILITOTAL 0.4 0.8   ALKPHOS 105 104   ALBUMIN 3.5 3.4*   AST 24 24   ALT 18 19         ImmunologlobulinsNo lab results found.         Imaging Data:   Reviewed   Study Result    Narrative & Impression   EXAM: MR CERVICAL SPINE W/O and W CONTRAST  LOCATION: [facility]  DATE: 9/25/2023     INDICATION: Neck pain in a patient with MSSA bacteremia of unclear source; fever and chills  COMPARISON: None.  CONTRAST: 10 mL Gadavist  TECHNIQUE: MRI Cervical Spine without and with IV contrast.     FINDINGS:   Normal vertebral body heights. Very mild marrow edema at the left C3-C4 facet. Otherwise, no suspicious marrow signal. Minimal retrolisthesis of C3 on C4 and C5 on C6. Minimal anterolisthesis of C7 on T1 and T1 on T2. Osseous fusion of the right C4-C5   facets. No abnormal cord signal. Epidural enhancement eccentrically to the left and posteriorly at C3-C4.       Major vascular flow voids in the neck are maintained.     Craniovertebral junction and C1-C2: Widely patent.     C2-C3: No significant canal or foraminal stenosis.      C3-C4: Broad-based disc and osteophyte. Bilateral facet arthropathy, right greater than left. Enhancement surrounding the left facet joint with trace fluid in the joint space. Mild canal stenosis. Mild right and severe left foraminal stenosis.      C4-C5: Broad-based disc and osteophyte. Bilateral facet arthropathy. Ventral cord flattening with mild canal stenosis. Mild right foraminal stenosis.     C5-C6: Broad-based disc and osteophyte. Bilateral uncovertebral spurring. Bilateral facet arthropathy. Ventral cord flattening with  moderate canal stenosis. Severe bilateral foraminal stenosis.     C6-C7: Broad-based disc and osteophyte. Bilateral facet arthropathy. Moderate canal stenosis. Severe bilateral foraminal stenosis.      C7-T1: No significant canal or foraminal stenosis.                                                                      IMPRESSION:  1.  Fluid in the left C3-C4 facet joint with surrounding inflammatory change. Septic arthritis is a primary consideration as the degree of inflammation is is greater than expected for degenerative arthropathy.  2.  Epidural enhancement at C3-C4 could reflect reactive change versus early phlegmon formation. No epidural abscess/drainable collection.  3.  Multilevel degenerative changes with multilevel cord flattening and moderate canal stenosis at C5-C6 and C6-C7.  4.  No abnormal cord signal.

## 2023-09-28 NOTE — PLAN OF CARE
Problem: Pain Acute  Goal: Optimal Pain Control and Function  Intervention: Develop Pain Management Plan  Recent Flowsheet Documentation  Taken 9/28/2023 1318 by Zoila Ortiz RN  Pain Management Interventions: medication (see MAR)  Intervention: Prevent or Manage Pain  Recent Flowsheet Documentation  Taken 9/28/2023 0900 by Zoila Ortiz RN  Medication Review/Management:   high-risk medications identified   medications reviewed   Goal Outcome Evaluation:      Co pain left side of her neck medicated with Oxycodone  And Tylenol with good relief. Picc line placed for IV antibotics needs antibotics for 6 weeks.  Showered today up with assist of one and walker   Having abdominal ultrasound this afternoon

## 2023-09-28 NOTE — TELEPHONE ENCOUNTER
----- Message from Cash Tse MD sent at 9/28/2023 11:59 AM CDT -----  Please add this patient to my schedule in 3 to 4 weeks.  Thanks.  Rasheed

## 2023-09-28 NOTE — PLAN OF CARE
Goal Outcome Evaluation:      Plan of Care Reviewed With: patient    Overall Patient Progress: improvingOverall Patient Progress: improving    Outcome Evaluation: Pt reports mild neck pain. SBA up to the bathroom with walker and gait belt. No events this shift.

## 2023-09-28 NOTE — PROGRESS NOTES
Care Management Follow Up    Length of Stay (days): 9    Expected Discharge Date: 09/28/2023     Concerns to be Addressed:     Discharge planning  Patient plan of care discussed at interdisciplinary rounds: Yes    [facility]     Anticipated Discharge Services:  infusion  Anticipated Discharge DME:      Patient/family educated on Medicare website which has current facility and service quality ratings:  yes, from medicare/gov  Education Provided on the Discharge Plan:    Patient/Family in Agreement with the Plan: yes    Referrals Placed by CM/SW:  [facility].  Private pay costs discussed: Not applicable    Additional Information:  SW spoke to pt spouse regarding TCU, he is agreeable to [place] referrals or to to [facility] from medicare/gov list.   Referrals sent.  Pt accepted at [facility], pt agreeable, she requested SW speak to her . SW tried calling pt spouse with placement no answer on calls x2     EUSEBIO Herbert

## 2023-09-28 NOTE — PROGRESS NOTES
[facility]    Medicine Progress Note - Hospitalist Service    Date of Admission:  9/19/2023    Assessment & Plan   Makayla Dow is a 82 year old female admitted on 9/19/2023. She presented to the ER with fever, chest discomfort of 1 day duration.  She was found to have oxygen saturation in the high 80s.  Past medical history significant for HTN, Hypothyroidism, ELIZABETH (non compliant with CPAP)          9/27 :       Consulted IR  Plan for  Left cervical three-four facet joint aspiration for cultures with moderate IV sedation. Scheduled for this afternoon in IR around 1300  Spine surgery team signed off  Follow cultures, continue iv ancef, ID following      Not medically ready for discharge at this time.  Follow cultures from from aspiration from cervical spine, antibiotic plan        A/p :         Acute hypoxic respiratory failure secondary to HFpEF : resolved.  Cardiogenic pulmonary edema 2/2 HFpEF : resolved.  Metabolic alkalosis 2/2 diuresis    - CTA Chest: No pulmonary embolism,     - Echo: Normal left ventricular size and systolic performance with a visually, estimated ejection fraction of 60%. Mild biatrial enlargement    - cardio consulted  - s/p IV lasix - switched to oral bumex 2 mg oral daily, continue Spironolactone 12.5 mg daily, Losartan 25 mg daily  - Wean oxygen as tolerated  - Cardiology following       Sepsis secondary to MSSA Bacteremia    ID consulted  MSSA bacteremia 9/19 - 2/2 sets  Repeat blood cultures 9/21, 9/22, 9/23 : NGTD  RAFAEL 9/25 : no vegetations  MRI left knee 9/22 : post op changes    Neck pain, acute - for last few days, MRI cervical spine 9/25 : 1.  Fluid in the left C3-C4 facet joint with surrounding inflammatory change. Septic arthritis is a primary consideration as the degree of inflammation is is greater than expected for degenerative arthropathy. 2.  Epidural enhancement at C3-C4 could reflect reactive change versus early phlegmon formation. No  epidural abscess/drainable collection.3.  Multilevel degenerative changes with multilevel cord flattening and moderate canal stenosis at C5-C6 and C6-C7.4.  No abnormal cord signal.    Consulted neurosurgery : No surgical intervention recommended at this time, recommend evaluation by IR if aspiration of left C3-4 joint is possible to obtain cultures, consult order placed. Soft collar can be provided for comfort as needed. Recommend outpatient follow-up in neurosurgery clinic for evaluation of her cervical stenosis and discussion of symptoms of myelopathy as a nonurgent visit once her infectious symptoms have resolved. Neurosurgery  clinic will help arrange this visit. Neurosurgery will sign off at this time.     9/27 : Consulted IR  S/p :  Left cervical three-four facet joint aspiration for cultures with moderate IV sedation. Scheduled for this afternoon in IR around 1300    On iv ancef - started 9/21 - for MSSA bacteremia 9/19, discontinued iv vancomycin  Follow cultures sent today, ID following  Will need at least 6 weeks of IV Ancef outpatient         Elevated troponin  Chest discomfort, resolved  Initial troponin was elevated at 36 followed by 37  Downtrended to 29  No acute ST segment changes on EKG  RAFAEL : no wall motion changes      Pancreatic lesion vs mass  -exophytic density off the distal pancreatic tail; indeterminate for exophytic pancreatic parenchyma versus lesion. Recommend dedicated CT pancreas imaging   - CT Pancreas: no pancreatic mass       Hypothyroidism  TSH wnl  -PTA levothyroxine    Hyperlipidemia  - Statin    Severe Obesity  BMI: 44.74    Imaging finding  -Large hiatal hernia - follow up with surgery as outpatient  -7mm right lung base nodule - Follow up with PMD/ Pulm outpatient               Diet: Regular Diet Adult    DVT Prophylaxis: Enoxaparin (Lovenox) SQ  Wise Catheter: Not present  Lines: None     Cardiac Monitoring: None  Code Status: Full Code      Clinically Significant Risk  "Factors        # Hypokalemia: Lowest K = 3.2 mmol/L in last 2 days, will replace as needed                  # Severe Obesity: Estimated body mass index is 47.26 kg/m  as calculated from the following:    Height as of this encounter: 1.575 m (5' 2\").    Weight as of this encounter: 117.2 kg (258 lb 6.1 oz).     # Moderate Malnutrition: based on nutrition assessment           Disposition Plan      Expected Discharge Date: 09/28/2023    Discharge Delays: IV Medication - consider oral or Home Infusion  Destination: home with family  Discharge Comments: cultures/IV antibiotics  ID following  daily blood cutlures  infusion center          Jr Mendieta MD  Hospitalist Service  [facility]  Securely message with [employer] (more info)  Text page via [employer] Paging/Directory   ______________________________________________________________________        Physical Exam   Vital Signs: Temp: 98  F (36.7  C) Temp src: Oral BP: 133/63 Pulse: 69   Resp: 18 SpO2: 98 % O2 Device: Nasal cannula Oxygen Delivery: 1 LPM  Weight: 258 lbs 6.07 oz    General Appearance:  No distress noted  Respiratory: Diminished air entry bilaterally basilarly, + mild crackles bilaterally  Cardiovascular: S1 and S2 well heard, no murmur or gallop  GI: Soft abdomen, no tenderness, normoactive bowel sounds  Skin: Chronic venous stasis skin changes on bilateral legs, + edema b/l improving       Medical Decision Making       35 MINUTES SPENT BY ME on the date of service doing chart review, history, exam, documentation & further activities per the note.      Data     I have personally reviewed the following data over the past 24 hrs:    N/A  \   N/A   / N/A     136 93 (L) 26.7 (H) /  136 (H)   3.6 33 (H) 0.92 \       Imaging results reviewed over the past 24 hrs:   Recent Results (from the past 24 hour(s))   IR Bone Biopsy Vertebral    Narrative    [facility]  NEUROINTERVENTIONAL SURGERY  [facility]  9/27/2023 " 1:21 PM CDT    1. FLUOROSCOPICALLY-GUIDED PERCUTANEOUS FACET JOINT ASPIRATION (Left C3-4)    INDICATION: History of left side neck pain, elevated CRP, and fluid in the left C3-4 facet joint with clinical concern for infection. Request for facet aspiration and possible bone biopsy for further diagnosis and management.    CONSENT: The procedure and its indications, major risks, benefits, and alternatives were discussed. Risks including, but not limited to, pain, hemorrhage, infection, nerve damage, spinal cord damage, cardiac and pulmonary dysfunction related to sedation.   The possibility that the procedure may be nondiagnostic was discussed. Understanding was acknowledged and a signed informed consent was obtained.    MEDICATIONS:  None    FLUOROSCOPIC TIME: 1.8 minutes (2 images)    DOSE: Air Kerma: 30 mGy    ESTIMATED BLOOD LOSS: Minimal     PERFORMING PHYSICIAN(S):  Ciro Tan M.D.      PROCEDURE: The patient was placed in prone position upon the fluoroscopic table. The skin of the back was prepped and draped in sterile fashion. Comparing with the recent cervical spine MRI scan, the C3-4 level was fluoroscopically localized. The skin   over the left C3-4 facet joint trajectory was infiltrated with 1% lidocaine. A 22 G needle was then advanced into the facet joint under fluoroscopic guidance. A total of 0.5 cc of serosanguinous fluid was aspirated, mixed with preservative saline, and   sent for further analysis. The needle was then removed.     A dressing was applied. The procedure appeared well-tolerated. There was no apparent complication.      Impression    CONCLUSION:     1. Technically successful fluoroscopically-guided percutaneous left C3-4 facet joint aspiration with retrieval of 0.5 cc of serosanguinous fluid which was mixed with preservative free saline and sent for further analysis.     _____________________________________________  Ciro Tan M.D.  Neurointerventional Surgery  [place]  [facility]  Office: [phone]  Pager: [phone]  Cell: [phone]    CPT codes included for physician reference only:    Cervical/Thoracic: 00101

## 2023-09-28 NOTE — PLAN OF CARE
Problem: Plan of Care - These are the overarching goals to be used throughout the patient stay.    Goal: Plan of Care Review  Description: The Plan of Care Review/Shift note should be completed every shift.  The Outcome Evaluation is a brief statement about your assessment that the patient is improving, declining, or no change.  This information will be displayed automatically on your shift note.  Outcome: Progressing   Goal Outcome Evaluation:         PRN oxy given X2 for neck pain. Purewick in place at pt request. PICC placed new IV in RFA, SL. A1 GB walker.

## 2023-09-29 VITALS
DIASTOLIC BLOOD PRESSURE: 53 MMHG | HEIGHT: 62 IN | RESPIRATION RATE: 18 BRPM | HEART RATE: 81 BPM | WEIGHT: 258.38 LBS | OXYGEN SATURATION: 90 % | SYSTOLIC BLOOD PRESSURE: 112 MMHG | BODY MASS INDEX: 47.55 KG/M2 | TEMPERATURE: 98.5 F

## 2023-09-29 LAB
ANION GAP SERPL CALCULATED.3IONS-SCNC: 8 MMOL/L (ref 7–15)
BUN SERPL-MCNC: 25.6 MG/DL (ref 8–23)
CALCIUM SERPL-MCNC: 8.7 MG/DL (ref 8.8–10.2)
CHLORIDE SERPL-SCNC: 94 MMOL/L (ref 98–107)
CREAT SERPL-MCNC: 0.88 MG/DL (ref 0.51–0.95)
CRP SERPL-MCNC: 94.7 MG/L
DEPRECATED HCO3 PLAS-SCNC: 34 MMOL/L (ref 22–29)
EGFRCR SERPLBLD CKD-EPI 2021: 64 ML/MIN/1.73M2
ERYTHROCYTE [DISTWIDTH] IN BLOOD BY AUTOMATED COUNT: 13.1 % (ref 10–15)
FOLATE SERPL-MCNC: 24.7 NG/ML (ref 4.6–34.8)
GLUCOSE SERPL-MCNC: 108 MG/DL (ref 70–99)
HCT VFR BLD AUTO: 35.6 % (ref 35–47)
HGB BLD-MCNC: 11.4 G/DL (ref 11.7–15.7)
MAGNESIUM SERPL-MCNC: 2 MG/DL (ref 1.7–2.3)
MCH RBC QN AUTO: 29.8 PG (ref 26.5–33)
MCHC RBC AUTO-ENTMCNC: 32 G/DL (ref 31.5–36.5)
MCV RBC AUTO: 93 FL (ref 78–100)
PHOSPHATE SERPL-MCNC: 3.4 MG/DL (ref 2.5–4.5)
PLATELET # BLD AUTO: 308 10E3/UL (ref 150–450)
POTASSIUM SERPL-SCNC: 3.6 MMOL/L (ref 3.4–5.3)
RBC # BLD AUTO: 3.82 10E6/UL (ref 3.8–5.2)
SODIUM SERPL-SCNC: 136 MMOL/L (ref 135–145)
WBC # BLD AUTO: 6 10E3/UL (ref 4–11)

## 2023-09-29 PROCEDURE — 250N000011 HC RX IP 250 OP 636: Mod: JZ | Performed by: STUDENT IN AN ORGANIZED HEALTH CARE EDUCATION/TRAINING PROGRAM

## 2023-09-29 PROCEDURE — 86140 C-REACTIVE PROTEIN: CPT | Performed by: STUDENT IN AN ORGANIZED HEALTH CARE EDUCATION/TRAINING PROGRAM

## 2023-09-29 PROCEDURE — 36415 COLL VENOUS BLD VENIPUNCTURE: CPT | Performed by: STUDENT IN AN ORGANIZED HEALTH CARE EDUCATION/TRAINING PROGRAM

## 2023-09-29 PROCEDURE — 85027 COMPLETE CBC AUTOMATED: CPT | Performed by: STUDENT IN AN ORGANIZED HEALTH CARE EDUCATION/TRAINING PROGRAM

## 2023-09-29 PROCEDURE — 250N000013 HC RX MED GY IP 250 OP 250 PS 637: Performed by: STUDENT IN AN ORGANIZED HEALTH CARE EDUCATION/TRAINING PROGRAM

## 2023-09-29 PROCEDURE — 84100 ASSAY OF PHOSPHORUS: CPT | Performed by: STUDENT IN AN ORGANIZED HEALTH CARE EDUCATION/TRAINING PROGRAM

## 2023-09-29 PROCEDURE — 250N000013 HC RX MED GY IP 250 OP 250 PS 637: Performed by: INTERNAL MEDICINE

## 2023-09-29 PROCEDURE — 99239 HOSP IP/OBS DSCHRG MGMT >30: CPT | Performed by: STUDENT IN AN ORGANIZED HEALTH CARE EDUCATION/TRAINING PROGRAM

## 2023-09-29 PROCEDURE — 80048 BASIC METABOLIC PNL TOTAL CA: CPT | Performed by: STUDENT IN AN ORGANIZED HEALTH CARE EDUCATION/TRAINING PROGRAM

## 2023-09-29 PROCEDURE — 83735 ASSAY OF MAGNESIUM: CPT | Performed by: STUDENT IN AN ORGANIZED HEALTH CARE EDUCATION/TRAINING PROGRAM

## 2023-09-29 PROCEDURE — 82746 ASSAY OF FOLIC ACID SERUM: CPT | Performed by: STUDENT IN AN ORGANIZED HEALTH CARE EDUCATION/TRAINING PROGRAM

## 2023-09-29 RX ORDER — BUMETANIDE 2 MG/1
2 TABLET ORAL DAILY
Qty: 30 TABLET | Refills: 1 | DISCHARGE
Start: 2023-09-30

## 2023-09-29 RX ORDER — SPIRONOLACTONE 25 MG/1
12.5 TABLET ORAL DAILY
Qty: 15 TABLET | Refills: 1 | DISCHARGE
Start: 2023-09-30

## 2023-09-29 RX ORDER — LOSARTAN POTASSIUM 25 MG/1
25 TABLET ORAL DAILY
Qty: 30 TABLET | Refills: 1 | DISCHARGE
Start: 2023-09-30

## 2023-09-29 RX ORDER — METHOCARBAMOL 500 MG/1
500 TABLET, FILM COATED ORAL 3 TIMES DAILY PRN
Qty: 30 TABLET | Refills: 1 | DISCHARGE
Start: 2023-09-29 | End: 2023-10-24

## 2023-09-29 RX ADMIN — Medication 1 TABLET: at 08:52

## 2023-09-29 RX ADMIN — ENOXAPARIN SODIUM 40 MG: 40 INJECTION SUBCUTANEOUS at 08:53

## 2023-09-29 RX ADMIN — Medication 81 MG: at 08:53

## 2023-09-29 RX ADMIN — ACETAMINOPHEN 975 MG: 325 TABLET ORAL at 08:52

## 2023-09-29 RX ADMIN — SPIRONOLACTONE 12.5 MG: 25 TABLET ORAL at 08:53

## 2023-09-29 RX ADMIN — CEFAZOLIN SODIUM 2 G: 2 INJECTION, SOLUTION INTRAVENOUS at 05:25

## 2023-09-29 RX ADMIN — PRAVASTATIN SODIUM 20 MG: 20 TABLET ORAL at 08:53

## 2023-09-29 RX ADMIN — MICONAZOLE NITRATE: 20 POWDER TOPICAL at 08:54

## 2023-09-29 RX ADMIN — BUMETANIDE 2 MG: 2 TABLET ORAL at 08:53

## 2023-09-29 RX ADMIN — LOSARTAN POTASSIUM 25 MG: 25 TABLET, FILM COATED ORAL at 08:53

## 2023-09-29 RX ADMIN — OXYCODONE HYDROCHLORIDE 2.5 MG: 5 TABLET ORAL at 05:45

## 2023-09-29 RX ADMIN — OXYBUTYNIN CHLORIDE 10 MG: 10 TABLET, EXTENDED RELEASE ORAL at 08:53

## 2023-09-29 RX ADMIN — LEVOTHYROXINE SODIUM 50 MCG: 0.03 TABLET ORAL at 05:45

## 2023-09-29 ASSESSMENT — ACTIVITIES OF DAILY LIVING (ADL)
ADLS_ACUITY_SCORE: 38

## 2023-09-29 NOTE — PLAN OF CARE
Goal Outcome Evaluation:      Plan of Care Reviewed With: patient           Pt discharged to [facility] with her  as

## 2023-09-29 NOTE — PROGRESS NOTES
Care Management Follow Up    Length of Stay (days): 10    Expected Discharge Date: 09/29/2023     Concerns to be Addressed: all concerns addressed in this encounter  TCU  Patient plan of care discussed at interdisciplinary rounds: Yes    Anticipated Discharge Disposition: Transitional Care - [facility]     Anticipated Discharge Services: rehab provided at the TCU  Anticipated Discharge DME: None    Patient/family educated on Medicare website which has current facility and service quality ratings: yes  Education Provided on the Discharge Plan:    Patient/Family in Agreement with the Plan: yes    Referrals Placed by CM/SW: Post Acute Facilities  Private pay costs discussed: Not applicable    Additional Information:  Patient is ready for discharge today and she has been accepted at [facility] in [place]. She will complete a six week course of antibiotics there. She is ready for discharge today. This writer spoke with her  [facility] via phone. He is in agreement with plan and can transport her today at 1300. This writer updated patient's nurse Sylwia. Will send orders to facility as soon as they are completed. PAS [phone].     CHEO BlackwellSW

## 2023-09-29 NOTE — DISCHARGE SUMMARY
"[facility]  Hospitalist Discharge Summary      Date of Admission:  9/19/2023  Date of Discharge:  9/29/2023  Discharging Provider: Saad J. Gondal, MD  Discharge Service: Hospitalist Service    Discharge Diagnoses   Acute hypoxic respiratory failure secondary to heart failure with preserved ejection fraction, cardiogenic pulmonary edema, mild LAURE, sepsis secondary to MSSA bacteremia, demand ischemia    Clinically Significant Risk Factors     # Severe Obesity: Estimated body mass index is 47.26 kg/m  as calculated from the following:    Height as of this encounter: 1.575 m (5' 2\").    Weight as of this encounter: 117.2 kg (258 lb 6.1 oz).  # Moderate Malnutrition: based on nutrition assessment      Follow-ups Needed After Discharge   Follow-up Appointments     Follow Up and recommended labs and tests      Follow up with longterm physician.  The following labs/tests are   recommended: CBC, BMP, CRP weekly until patient is on cefazolin        {Additional follow-up instructions/to-do's for PCP:    Unresulted Labs Ordered in the Past 30 Days of this Admission       Date and Time Order Name Status Description    9/29/2023 12:02 AM Folate In process     9/26/2023  4:22 PM Fungal or Yeast Culture Routine Preliminary     9/26/2023  4:22 PM Anaerobic Bacterial Culture Routine Preliminary     9/26/2023  4:22 PM Synovial fluid Aerobic Bacterial Culture Routine with Gram Stain Preliminary         These results will be followed up by PCP    Discharge Disposition   Discharged to short-term care facility  Condition at discharge: Stable    Hospital Course   Assessment:  Makayla Dow is a 82 year old female admitted on 9/19/2023. She presented to the ER with fever, chest discomfort of 1 day duration.  She was found to have oxygen saturation in the high 80s.  Past medical history significant for HTN, Hypothyroidism, ELIZABETH (non compliant with CPAP)    Problem list and plan:  Acute hypoxic respiratory " failure secondary to HFpEF resolved  Cardiogenic pulmonary edema 2/2 HFpEF resolved  Metabolic alkalosis 2/2 diuresis  Patient presented to the ER with fever, chest discomfort of 1 day duration.  She was found to have oxygen saturation in the high 80s  CT chest PE study negative for pulmonary embolism, some evidence of pulmonary hypertension, atelectasis and small airway disease/gas trapping.  Echo: Normal left ventricular size and systolic performance with a visually, estimated ejection fraction of 60%. Mild biatrial enlargement  BNP up trended  Cardiology following  Currently on goal-directed medical therapy for CHF on losartan 25 mg daily, spironolactone 12.5 mg daily and Bumex 2 mg oral daily  Was initially on IV Lasix now switched to oral bumex 2 mg oral daily  Currently saturating in the low to mid 90s On 1 liter of oxygen ( not on home oxygen) wean off oxygen as tolerated  Respiratory viral panel negative for COVID, flu, respiratory syncytial virus  Uptrending inflammatory markers(CRP), now downtrending  PT evaluated and recommended home with assist home with home care physical therapy but as patient would need 6 weeks of IV antibiotics suspecting will need to be discharged to TCU, this was discussed with both patient and case management team.  BNP trended down and normalized to 318  Patient is clinically and hemodynamically stable and is appropriate for discharge    Mild LAURE resolved  Renal function trended up to 1.19 now back to 0.88  Monitored renal function  Avoided nephrotoxic meds and renally dosed all meds    Electrolyte abnormality/hypokalemia  Repleted and resolved    Sepsis secondary to MSSA Bacteremia  Leukocytosis secondary to above resolved  Acute neck pain/Septic arthritis resolving  ID consulted  MSSA bacteremia 9/19 - 2/2 sets  Procalcitonin 0.88  Repeat blood cultures 9/21, 9/22, 9/23 showing no growth to date  RAFAEL 9/25 : Limited views showed no vegetations  MRI left knee 9/22 post op  changes  MRI cervical spine 9/25: Fluid in the left C3-C4 facet joint with surrounding inflammatory change. Septic arthritis is a primary consideration as the degree of inflammation is is greater than expected for degenerative arthropathy. Epidural enhancement at C3-C4 could reflect reactive change versus early phlegmon formation. No epidural abscess/drainable collection. Multilevel degenerative changes with multilevel cord flattening and moderate canal stenosis at C5-C6 and C6-C7.4.  No abnormal cord signal.  Consulted neurosurgery: No surgical intervention recommended at this time, recommend evaluation by IR if aspiration of left C3-4 joint is possible to obtain cultures, consult order placed. Soft collar can be provided for comfort as needed. Recommend outpatient follow-up in neurosurgery clinic for evaluation of her cervical stenosis and discussion of symptoms of myelopathy as a nonurgent visit once her infectious symptoms have resolved. Neurosurgery clinic will help arrange this visit. Neurosurgery signed off.  9/27 Consulted IR. S/p Left cervical three-four facet joint aspiration.  Specimen sent for further analysis  On iv ancef - started 9/21 - for MSSA bacteremia 9/19, discontinued iv vancomycin  Follow synovial cultures sent 9/27/2023, ID following  Will need at least 6 weeks of IV Ancef outpatient   Monitored fever and WBC curve  As per ID patient can be discharged on IV Ancef with weekly CBC with differential, BMP, CRP, total duration of 6 weeks, PICC line order placed, follow-up in ID clinic in 3 to 4 weeks which has been arranged by ID, monitor for rash.  Case management working on placement  S/p PICC line placement 9/28/2023  CRP trending down, patient would need to have repeat labs as per ID recommendations weekly until on IV antibiotics.  Fluid cultures currently showing no growth, full results to follow.    Normocytic anemia  Monitored hemoglobin currently stable  Iron panel suggesting anemia of  chronic disease    Dilated gallbladder with mild pericholecystic fat stranding on imaging  Mildly dilated gallbladder with mild pericholecystic fat stranding suggesting cholecystitis. Small gallstones on ct abd.  Patient does not have any abdominal pain on physical examination and Chin sign was negative, suspecting dilated gallbladder from congestion in the setting of CHF, Followed-up with liver sono as CHF is improved.   Serial abdominal examination benign  Ultrasound abdomen showed Mild gallbladder distention and mural thickening with mobile stone and sludge material unchanged allowing for difference in technique. Negative sonographic Chin's sign. No bile duct dilatation. Mild diffuse hepatic steatosis.  Abdominal examination is completely benign and patient denied any complaints or pain in the abdomen, no further work-up at current time, advised the patient that if she develop any abdominal pain may need to be evaluated again for gallbladder etiology specifically if the pain is in the right upper quadrant which the patient was in agreement with.     Elevated troponin most likely setting of type II NSTEMI/demand ischemia most likely 2/2 CHF exacerbation  Chest discomfort, resolved  Initial troponin was elevated at 36 followed by 37  Downtrended to 29  Could not appreciate any ischemic changes on EKG  RAFAEL : no wall motion changes  9/22/2023 Stress EKG is negative for inducible myocardial ischemia, on stress test there is no reversible change on stress and rest images indicating inducible myocardial ischemia, normal left ventricular size, wall motion, systolic function, the calculated left ventricular ejection fraction 70%, the patient is low risk of future cardiac ischemic events as per stress test.    Pancreatic lesion vs mass  Exophytic density off the distal pancreatic tail; indeterminate for exophytic pancreatic parenchyma versus lesion. Recommended dedicated CT pancreas imaging   CT Pancreas showed  tortuous pancreatic tail accounting for the exophytic appearance on prior chest CT. No pancreatic masses.      History of hypothyroidism  TSH 2.98  Continue with levothyroxine 50 mcg oral daily    History of hyperlipidemia  Prediabetes  Continue with statin  Hemoglobin A1c 5.9    Morbid obesity  BMI: 47    Imaging finding  Large hiatal hernia - follow up with surgery as outpatient  7mm right lung base nodule - Follow up with PMD/ Pulm outpatient    Consultations This Hospital Stay   CARE MANAGEMENT / SOCIAL WORK IP CONSULT  PHARMACY TO DOSE VANCO  INFECTIOUS DISEASES IP CONSULT  CARDIOLOGY IP CONSULT  PHYSICAL THERAPY ADULT IP CONSULT  SPINE SURGERY ADULT IP CONSULT  INTERVENTIONAL RADIOLOGY ADULT/PEDS IP CONSULT  VASCULAR ACCESS ADULT IP CONSULT  PHYSICAL THERAPY ADULT IP CONSULT  OCCUPATIONAL THERAPY ADULT IP CONSULT    Code Status   Full Code    Time Spent on this Encounter   I, Saad J. Gondal, MD, personally saw the patient today and spent greater than 30 minutes discharging this patient.       Saad J. Gondal, MD  [address] [address]  Phone: [phone]  Fax: [phone]  ______________________________________________________________________    Physical Exam   Vital Signs: Temp: 98.2  F (36.8  C) Temp src: Oral BP: 129/61 Pulse: 70   Resp: 18 SpO2: 93 % O2 Device: None (Room air)    Weight: 258 lbs 6.07 oz    GENERAL: Patient was seen and examined at bedside with no acute distress  HENT:  Head is normocephalic, atraumatic.  Decreased range of motion of the neck.  EYES:  Eyes have anicteric sclerae without conjunctival injection   LUNGS: Bilateral air entry, decreased breath sounds at the bilateral bases  CARDIOVASCULAR:  Regular rate and rhythm with no murmurs, gallops or rubs.  ABDOMEN:  Normal bowel sounds. Soft; nontender   EXT: Chronic venous stasis skin changes on bilateral legs, no edema appreciated  SKIN:  No acute rashes.   NEUROLOGIC:   Grossly nonfocal.       Primary Care Physician   Jerzy Spencer    Discharge Orders      Lipid Profile     Adult Infectious Disease  Referral      Adult Cardiology Eval [facility] Referral      General info for SNF    Length of Stay Estimate: Short Term Care: Estimated # of Days <30  Condition at Discharge: Stable  Level of care:skilled   Rehabilitation Potential: Good  Admission H&P remains valid and up-to-date: Yes  Recent Chemotherapy: N/A  Use Nursing Home Standing Orders: Yes     Mantoux instructions    Give two-step Mantoux (PPD) Per Facility Policy Yes     Follow Up and recommended labs and tests    Follow up with [facility].  The following labs/tests are recommended: CBC, BMP, CRP weekly until patient is on cefazolin     Reason for your hospital stay    Patient presented to the ER with fever, chest discomfort of 1 day duration and was found to be hypoxic, received treatment for acute hypoxic respiratory failure secondary to acute heart failure exacerbation, Also was found to be septic with MSSA bacteremia.     Activity - Up with nursing assistance     Physical Therapy Adult Consult    Evaluate and treat as clinically indicated.    Reason:  Weakness     Occupational Therapy Adult Consult    Evaluate and treat as clinically indicated.    Reason:  Weakness     Fall precautions     Diet    Follow this diet upon discharge: Orders Placed This Encounter      Regular Diet Adult       Significant Results and Procedures   Most Recent 3 CBC's:  Recent Labs   Lab Test 09/29/23  0718 09/26/23  1226 09/25/23  0744 09/22/23  0633 09/21/23  0750   WBC 6.0 9.0  --   --  10.3   HGB 11.4* 11.3*  --   --  11.5*   MCV 93 93  --   --  94    270 235   < > 212    < > = values in this interval not displayed.     Most Recent 3 BMP's:  Recent Labs   Lab Test 09/29/23  0718 09/27/23  0723 09/26/23  1226    136 135   POTASSIUM 3.6 3.6 3.2*   CHLORIDE 94* 93* 91*   CO2 34* 33* 32*   BUN 25.6* 26.7*  22.1   CR 0.88 0.92 1.19*   ANIONGAP 8 10 12   ALVARO 8.7* 8.8 8.8   * 136* 137*   ,   Results for orders placed or performed during the hospital encounter of 09/19/23   CT Chest Pulmonary Embolism w Contrast    Narrative    EXAM: CT CHEST PULMONARY EMBOLISM W CONTRAST  LOCATION: [facility]  DATE: 9/19/2023    INDICATION: Chest pain. Hypoxia. Edema.  COMPARISON: None.  TECHNIQUE: CT chest pulmonary angiogram during arterial phase injection of IV contrast. Multiplanar reformats and MIP reconstructions were performed. Dose reduction techniques were used.   CONTRAST: isovue 370 90ml    FINDINGS:  ANGIOGRAM CHEST: Mild motion artifact. No pulmonary embolism identified. Pulmonary trunk enlargement at 3.1 cm. Nonaneurysmal aorta without dissection.    LUNGS AND PLEURA: Expiratory phase of imaging and mosaicism. Mild atelectasis. No pleural effusion or pneumothorax.    MEDIASTINUM/AXILLAE: No adenopathy. Borderline cardiac enlargement. No pericardial effusion. Large hiatus hernia.    CORONARY ARTERY CALCIFICATION: Motion artifact. At least mild.    UPPER ABDOMEN: Motion artifact, questionable exophytic density off the distal pancreatic tail (series 6, image 85).    MUSCULOSKELETAL: Bony demineralization and degenerative changes.      Impression    IMPRESSION:    1.  Motion artifact.    2.  No pulmonary embolism identified.    3.  Slight pulmonary trunk enlargement; correlate for pulmonary hypertension.    4.  Nonaneurysmal aorta without dissection.    5.  Mild opacities in the lungs, likely atelectasis. Pulmonary mosaicism, suggesting small airways disease / gas trapping.    6.  Large hiatus hernia.    7.  Borderline cardiac silhouette enlargement.    8.  Motion artifact compromises evaluation, though exophytic density off the distal pancreatic tail; indeterminate for exophytic pancreatic parenchyma versus lesion. Recommend dedicated CT pancreas imaging (can be done nonemergent).     CT  Pancreas w Contrast    Narrative    EXAM: CT PANCREAS W CONTRAST  LOCATION: [facility]  DATE: 9/20/2023    INDICATION: exophytic pancreatic parenchyma versus lesion in distal pancreatic tail  COMPARISON: Chest CTA 09/19/2023  TECHNIQUE: CT abdomen using pancreas protocol before and after injection of IV contrast. Precontrast images through pancreas and dynamic post contrast through the abdomen during arterial and portal venous phases. Multiplanar reformats were obtained. Dose   reduction techniques were used.  CONTRAST: 90ml isovue 370    FINDINGS:   LOWER CHEST: Partially visualized large hiatal hernia with protrusion of the stomach into the mediastinum. Stable, 7 mm nodular opacity at the posterior aspect of the right lung base (image #16 series 6). Subsegmental atelectasis, left lung base.    HEPATOBILIARY: Mildly distended gallbladder with mild pericholecystic fat stranding. Small gallstones. Liver is within normal limits.    PANCREAS: Moderate fatty atrophy of the pancreatic head and uncinate process. Tortuous pancreatic tail, accounting for the exophytic appearance on yesterday's chest CT. No pancreatic masses.    SPLEEN: Calcified splenic granulomas. Splenic size is within normal limits.    ADRENAL GLANDS: Normal.    KIDNEYS/BLADDER: 1.8 cm simple cyst, left kidney. No follow-up necessary for this. Both kidneys are otherwise negative. Moderately distended bladder.    BOWEL: No obstruction or inflammatory change. Left colonic diverticulosis. Appendix is within normal limits.    LYMPH NODES: Normal.    VASCULATURE: Moderate calcified atherosclerotic changes of a normal caliber abdominal aorta.    PELVIC ORGANS: Coarse uterine calcifications which most likely represent small, calcified fibroids.    MUSCULOSKELETAL: Multilevel degenerative disc disease. Degenerative changes lower lumbar facet joints.      Impression    IMPRESSION:   1.  Tortuous pancreatic tail accounting for the  exophytic appearance on yesterday's chest CT. No pancreatic masses.  2.  Mildly dilated gallbladder with mild pericholecystic fat stranding suggesting cholecystitis. Small gallstones.  3.  Partially visualized large hiatal hernia.  4.  Stable 7 mm right lung base nodule.    REFERENCE:  Guidelines for Management of Incidental Pulmonary Nodules Detected on CT Images: From the Fleischner Society 2017.   Guidelines apply to incidental nodules in patients who are 35 years or older.  Guidelines do not apply to lung cancer screening, patients with immunosuppression, or patients with known primary cancer.    SINGLE NODULE    Nodule size 6-8 mm  Low-risk patients: Follow-up CT at 6-12 months, then consider CT at 18-24 months.  High-risk patients: Follow-up CT at 6-12 months, then at 18-24 months if no change.         MR Knee Left w/o & w Contrast    Narrative    EXAM: MR KNEE LEFT W/O and W CONTRAST  LOCATION: [facility]  DATE: 9/22/2023    INDICATION: Left prosthetic knee pain in a patient with Staph aureus bacteremia. Look for evidence of prosthetic septic arthritis.  COMPARISON: None.  TECHNIQUE: Metal artifact reduction protocol. 10 mL Gadavist.    FINDINGS:  MEDIAL COMPARTMENT:   -Extensive metallic susceptibility artifact from total knee arthroplasty. No evidence for reactive osteolysis or loosening.    LATERAL COMPARTMENT:   -Extensive metallic susceptibility artifact from total knee arthroplasty. No evidence for reactive osteolysis or loosening.    PATELLOFEMORAL COMPARTMENT:   -Postoperative changes patellar resurfacing. No evidence for loosening.     LIGAMENTS:   -Obscured by artifact.    POSTEROMEDIAL CORNER:   -Distal semimembranosus tendon and pes anserine tendons are intact.    POSTEROLATERAL CORNER:   -Popliteal tendon and biceps femoris tendon are intact.    EXTENSOR MECHANISM:   -Quadriceps tendon: Tendinopathy. No tearing.  -Patellar tendon: Tendinopathy. No  tearing.  -Patellofemoral ligaments and retinacula: Intact.    JOINT:  -No significant effusion. No evidence for septic arthropathy.    BONES:  -No fracture or bone marrow signal abnormality.    SOFT TISSUES:   -No periprosthetic fluid collection. No acute muscular injury or soft tissue mass.       Impression    IMPRESSION:  1.  Postoperative changes of total knee arthroplasty and patellar resurfacing. No evidence for reactive osteolysis or loosening. No evidence for osteomyelitis.  2.  No significant effusion to suggest septic arthropathy.  3.  Quadriceps and patellar tendinopathy without tearing.  4.  Exam otherwise negative.   MR CERVICAL SPINE W/O & W CONTRAST    Narrative    EXAM: MR CERVICAL SPINE W/O and W CONTRAST  LOCATION: [facility]  DATE: 9/25/2023    INDICATION: Neck pain in a patient with MSSA bacteremia of unclear source; fever and chills  COMPARISON: None.  CONTRAST: 10 mL Gadavist  TECHNIQUE: MRI Cervical Spine without and with IV contrast.    FINDINGS:   Normal vertebral body heights. Very mild marrow edema at the left C3-C4 facet. Otherwise, no suspicious marrow signal. Minimal retrolisthesis of C3 on C4 and C5 on C6. Minimal anterolisthesis of C7 on T1 and T1 on T2. Osseous fusion of the right C4-C5   facets. No abnormal cord signal. Epidural enhancement eccentrically to the left and posteriorly at C3-C4.      Major vascular flow voids in the neck are maintained.    Craniovertebral junction and C1-C2: Widely patent.    C2-C3: No significant canal or foraminal stenosis.     C3-C4: Broad-based disc and osteophyte. Bilateral facet arthropathy, right greater than left. Enhancement surrounding the left facet joint with trace fluid in the joint space. Mild canal stenosis. Mild right and severe left foraminal stenosis.     C4-C5: Broad-based disc and osteophyte. Bilateral facet arthropathy. Ventral cord flattening with mild canal stenosis. Mild right foraminal  stenosis.    C5-C6: Broad-based disc and osteophyte. Bilateral uncovertebral spurring. Bilateral facet arthropathy. Ventral cord flattening with moderate canal stenosis. Severe bilateral foraminal stenosis.    C6-C7: Broad-based disc and osteophyte. Bilateral facet arthropathy. Moderate canal stenosis. Severe bilateral foraminal stenosis.     C7-T1: No significant canal or foraminal stenosis.      Impression    IMPRESSION:  1.  Fluid in the left C3-C4 facet joint with surrounding inflammatory change. Septic arthritis is a primary consideration as the degree of inflammation is is greater than expected for degenerative arthropathy.  2.  Epidural enhancement at C3-C4 could reflect reactive change versus early phlegmon formation. No epidural abscess/drainable collection.  3.  Multilevel degenerative changes with multilevel cord flattening and moderate canal stenosis at C5-C6 and C6-C7.  4.  No abnormal cord signal.   IR Bone Biopsy Vertebral    [facility]  NEUROINTERVENTIONAL SURGERY  [facility]  9/27/2023 1:21 PM CDT    1. FLUOROSCOPICALLY-GUIDED PERCUTANEOUS FACET JOINT ASPIRATION (Left C3-4)    INDICATION: History of left side neck pain, elevated CRP, and fluid in the left C3-4 facet joint with clinical concern for infection. Request for facet aspiration and possible bone biopsy for further diagnosis and management.    CONSENT: The procedure and its indications, major risks, benefits, and alternatives were discussed. Risks including, but not limited to, pain, hemorrhage, infection, nerve damage, spinal cord damage, cardiac and pulmonary dysfunction related to sedation.   The possibility that the procedure may be nondiagnostic was discussed. Understanding was acknowledged and a signed informed consent was obtained.    MEDICATIONS:  None    FLUOROSCOPIC TIME: 1.8 minutes (2 images)    DOSE: Air Kerma: 30 mGy    ESTIMATED BLOOD LOSS: Minimal     PERFORMING  PHYSICIAN(S):  Ciro Tan M.D.      PROCEDURE: The patient was placed in prone position upon the fluoroscopic table. The skin of the back was prepped and draped in sterile fashion. Comparing with the recent cervical spine MRI scan, the C3-4 level was fluoroscopically localized. The skin   over the left C3-4 facet joint trajectory was infiltrated with 1% lidocaine. A 22 G needle was then advanced into the facet joint under fluoroscopic guidance. A total of 0.5 cc of serosanguinous fluid was aspirated, mixed with preservative saline, and   sent for further analysis. The needle was then removed.     A dressing was applied. The procedure appeared well-tolerated. There was no apparent complication.      Impression    CONCLUSION:     1. Technically successful fluoroscopically-guided percutaneous left C3-4 facet joint aspiration with retrieval of 0.5 cc of serosanguinous fluid which was mixed with preservative free saline and sent for further analysis.     _____________________________________________  Ciro Tan M.D.  Neurointerventional Surgery  [facility]  Office: [phone]  Pager: [phone]  Cell: [phone]    CPT codes included for physician reference only:    Cervical/Thoracic: 49797     US Abdomen Limited    Narrative    EXAM: US ABDOMEN LIMITED  LOCATION: [facility]  DATE: 9/28/2023    INDICATION: dilated gall bladder on previous imaging  COMPARISON: 9/20/2023 CT AP  TECHNIQUE: Limited abdominal ultrasound.    FINDINGS:    GALLBLADDER: Mild gallbladder distention and mural thickening with mobile stone and sludge material unchanged allowing for difference in technique. Negative sonographic Chin's sign.    BILE DUCTS: No biliary dilatation. The common duct measures 4 mm.    LIVER: Mild diffuse hepatic steatosis. Homogeneous parenchyma with smooth contour. No focal mass.    RIGHT KIDNEY: No hydronephrosis.    PANCREAS: The pancreas is completely obscured by  overlying gas.    No ascites.      Impression    IMPRESSION:  1.  Mild gallbladder distention and mural thickening with mobile stone and sludge material unchanged allowing for difference in technique. Negative sonographic Chin's sign.  2.  No bile duct dilatation.  3.  Mild diffuse hepatic steatosis.       Echocardiogram Complete    Narrative    [id number]  [id number]  [id number]  [id number]^STEPHIE^[place] [address]     Name: EUGENE CRUZ  MRN: [id number]  : 1940  Study Date: 2023 09:32 AM  Age: 82 yrs  Gender: Female  Patient Location: [facility]  Reason For Study: SOB  Ordering Physician: VANNA CARD  Performed By: AT     BSA: 2.1 m2  Height: 62 in  Weight: 244 lb  HR: 75  BP: 146/64 mmHg  ______________________________________________________________________________  Procedure  Complete Echo Adult. Definity (NDC #64091-354) given intravenously.  ______________________________________________________________________________  Interpretation Summary     1. Normal left ventricular size and systolic performance with a visually  estimated ejection fraction of 60%.  2. No significant valvular heart disease is identified on this study.  3. Normal right ventricular size and systolic performance.  4. There is mild biatrial enlargement.  ______________________________________________________________________________  Left ventricle:  Normal left ventricular size and systolic performance with a visually  estimated ejection fraction of 60%. There is normal regional wall motion. Left  ventricular wall thickness is normal.     Assessment of LV Diastolic Function: The cumulative findings suggest impaired  diastolic filling [The septal e' velocity is < 7 cm/s & lateral e' velocity  is  < 10 cm/s. The average E/e' is >14. TR velocity is < 2.8 m/s. Left atrial  volume index is greater than 34 mL/mÂ ].     Assessment of left atrial pressure (LAP): The  cumulative findings suggest  moderately elevated left atrial pressure (the E/A is > 0.8 and <2.0 plus 2 or  3 of 3 of the following present: Average E/e' > 14, TRvel > 2.8 m/s, and/or LA  vol. index > 34 ml/mÂ  ).     Right ventricle:  Probable normal right ventricular size and systolic performance though right-  sided structures are not clearly visualized on all views on this study.     Left atrium:  There is mild left atrial enlargement.     Right atrium:  There is mild right atrial enlargement.     IVC:  The IVC is of normal caliber.     Aortic valve:  The aortic valve is comprised of three cusps. No significant aortic stenosis  or aortic insufficiency is detected on this study.     Mitral valve:  The mitral valve appears morphologically normal. There is trace mitral  insufficiency.     Tricuspid valve:  The tricuspid valve is grossly morphologically normal. There is trace  tricuspid insufficiency.     Pulmonic valve:  The pulmonic valve is grossly morphologically normal.     Thoracic aorta:  The aortic root and proximal ascending aorta are of normal dimension.     Pericardium:  There is no significant pericardial effusion.  ______________________________________________________________________________  ______________________________________________________________________________  MMode/2D Measurements & Calculations  IVSd: 0.88 cm  LVIDd: 4.4 cm  LVIDs: 2.1 cm  LVPWd: 0.83 cm  FS: 53.0 %  LV mass(C)d: 120.0 grams  LV mass(C)dI: 57.7 grams/m2  Ao root diam: 2.7 cm  LA dimension: 3.5 cm  asc Aorta Diam: 3.4 cm  LA/Ao: 1.3  LVOT diam: 2.0 cm  LVOT area: 3.0 cm2  Ao root diam index Ht(cm/m): 1.7  Ao root diam index BSA (cm/m2): 1.3  Asc Ao diam index BSA (cm/m2): 1.6  Asc Ao diam index Ht(cm/m): 2.2     LA Volume Indexed (AL/bp): 34.6 ml/m2  RV Base: 4.2 cm  RWT: 0.37  TAPSE: 2.3 cm     Time Measurements  MM HR: 75.0 BPM     Doppler Measurements & Calculations  MV E max stanton: 107.0 cm/sec  MV A max stanton: 93.6  cm/sec  MV E/A: 1.1  MV max P.7 mmHg  MV mean P.9 mmHg  MV V2 VTI: 30.5 cm  MVA(VTI): 3.4 cm2  MV dec slope: 480.1 cm/sec2  MV dec time: 0.22 sec  Ao V2 max: 196.6 cm/sec  Ao max PG: 15.0 mmHg  Ao V2 mean: 134.8 cm/sec  Ao mean P.7 mmHg  Ao V2 VTI: 45.5 cm  LUZ(I,D): 2.3 cm2  LUZ(V,D): 2.5 cm2     LV V1 max PG: 10.3 mmHg  LV V1 max: 160.4 cm/sec  LV V1 VTI: 34.0 cm  SV(LVOT): 103.4 ml  SI(LVOT): 49.7 ml/m2  PA acc time: 0.09 sec  TR max henri: 212.4 cm/sec  TR max P.0 mmHg  AV Henri Ratio (DI): 0.82  LUZ Index (cm2/m2): 1.1  E/E': 16.5  E/E' av.7  Lateral E/e': 17.0  Medial E/e': 16.4  Peak E' Henri: 6.5 cm/sec  RV S Henri: 14.7 cm/sec     ______________________________________________________________________________  Report approved by: Wes Gunderson 2023 10:46 AM         Echocardiogram RAFAEL    Narrative    [id number]  [facility]  [id number]  [id number] [address]     Name: EUGENE CRUZ  MRN: [id number]  : 1940  Study Date: 2023 08:51 AM  Age: 82 yrs  Gender: Female  Patient Location: [facility]  Reason For Study: Endocarditis  Ordering Physician: CHARO PARIKH  Performed By: WZ-BP     BSA: 2.0 m2  Height: 62 in  Weight: 231 lb  HR: 70  BP: 138/64 mmHg  ______________________________________________________________________________  Procedure  Complete RAFAEL Adult.  ______________________________________________________________________________  Interpretation Summary     Very poor acustic windows due to large hiatal hernia and heart shift. Limited  views showed no vegetations.  ______________________________________________________________________________  RAFAEL  Patient was sedated using Versed 2 mg. The heart rate, respiratory rate,  oxygen saturations, blood pressure, and response to care were monitored  throughout the procedure with the assistance of the nurse. Patient was sedated  using Fentanyl 50 mcg.  15 ml viscous Lidocaine, 0.5 ml Benzocaine spray. I  determined this patient to be an appropriate candidate for the planned  sedation and procedure and have reassessed the patient immediately prior to  sedation and procedure. Total sedation time: 17 minutes of continuous bedside  1:1 monitoring.     Left Ventricle  The left ventricle is not well visualized.     Right Ventricle  The right ventricle is not well visualized.     Atria  The left atrium is not well visualized.     Mitral Valve  The mitral valve is not well visualized.     Tricuspid Valve  The tricuspid valve is not well visualized.     Aortic Valve  Normal tricuspid aortic valve.     Pulmonic Valve  The pulmonic valve is normal in structure and function.     Vessels  Normal size ascending aorta.     Pericardial/Pleural  The pericardium is not well visualized.  ______________________________________________________________________________  Report approved by: Wes Bishop 09/25/2023 10:11 AM     ______________________________________________________________________________      NM Lexiscan stress test     Value    Pharmacologic Protocol Lexiscan    Test Type Pharmacological    Baseline HR 73    Baseline Systolic     Baseline Diastolic BP 63    Last Stress HR 84    Last Stress Systolic     Last Stress Diastolic BP 57    Target     PERCENT HR 85%    ST Deviation Elevation III 0.3mm    Deviation Time I -0.4mm    ST Elevation Amount V2 0.7mm    ST Deviation Amount he aVR -0.5mm    Final Resting /62    Final Resting HR 81    Max Treadmill Speed 0.0    Max Treadmill Grade 0.0    Peak Systolic /62    Peak Diastolic /62    Max HR  88    Stress Phase Resting    Stress Resting Pt Position SUPINE    Current HR 70    Current /63    Stress Phase Stress    Stage Minute EXE 00:00    Exercise Stage STAGE 2    Current HR 73    Current /63    Stress Phase Stress    Stage Minute EXE 01:00    Exercise Stage STAGE 3     Current HR 73    Current /63    Stress Phase Stress    Stage Minute EXE 02:00    Exercise Stage STAGE 4    Current HR 88    Current /63    Stress Phase Stress    Stage Minute EXE 03:00    Exercise Stage STAGE 5    Current HR 83    Current /63    Stress Phase Stress    Stage Minute EXE 03:18    Exercise Stage STAGE 5    Current HR 83    Current BP 97/56    Stress Phase Stress    Stage Minute EXE 03:49    Exercise Stage STAGE 5    Current HR 85    Current /57    Stress Phase Stress    Stage Minute EXE 04:00    Exercise Stage STAGE 6    Current HR 84    Current /57    Stress Phase Stress    Stage Minute EXE 04:01    Exercise Stage STAGE 6    Current HR 84    Current /57    Stress Phase Recovery    Stage Minute REC 00:40    Exercise Stage Recovery    Current HR 83    Current /59    Stress Phase Recovery    Stage Minute REC 00:58    Exercise Stage Recovery    Current HR 84    Current /59    Stress Phase Recovery    Stage Minute REC 01:58    Exercise Stage Recovery    Current HR 85    Current /59    Stress Phase Recovery    Stage Minute REC 02:03    Exercise Stage Recovery    Current HR 84    Current /59    Stress Phase Recovery    Stage Minute REC 02:58    Exercise Stage Recovery    Current HR 84    Current /59    Stress Phase Recovery    Stage Minute REC 03:05    Exercise Stage Recovery    Current HR 84    Current /62    Stress Phase Recovery    Stage Minute REC 03:58    Exercise Stage Recovery    Current HR 81    Current /62    Stress Phase Recovery    Stage Minute REC 04:02    Exercise Stage Recovery    Current HR 81    Current /62    Max Predicted HR  65    Rate Pressure Product 10,824.0    Left Ventricular EF 70    Narrative      A prior study was conducted on 8/27/2020.  This study has no change   when compared with the prior study.    Pharmacological regadenoson stress ECG is negative for inducible   myocardial ischemia.     Pharmacological Regadenonson nuclear stress test is probably abnormal.    There is mildly reduced radiotracer uptake in inferolateral segments from   basal to mid segments which is consistent with previous nontransmural   myocardial infarction.  There is no reversible change on stress and rest   images indicating inducible myocardial ischemia.    Normal left ventricular size, wall motion and systolic function.  The   calculated left ventricular ejection fraction is 70%.    The patient is at a low risk of future cardiac ischemic events.       Discharge Medications   Current Discharge Medication List        START taking these medications    Details   bumetanide (BUMEX) 2 MG tablet Take 1 tablet (2 mg) by mouth daily  Qty: 30 tablet, Refills: 1    Associated Diagnoses: Congestive heart failure, unspecified HF chronicity, unspecified heart failure type (H)      ceFAZolin (ANCEF) intermittent infusion 2 g in 100 mL dextrose PRE-MIX Inject 100 mLs (2 g) into the vein every 8 hours for 42 days  Qty: 51677 mL, Refills: 0    Comments: Weekly CBC with differential, BMP, CRP.  Associated Diagnoses: MSSA bacteremia; Cervical discitis      losartan (COZAAR) 25 MG tablet Take 1 tablet (25 mg) by mouth daily  Qty: 30 tablet, Refills: 1    Associated Diagnoses: Congestive heart failure, unspecified HF chronicity, unspecified heart failure type (H)      methocarbamol (ROBAXIN) 500 MG tablet Take 1 tablet (500 mg) by mouth 3 times daily as needed for muscle spasms  Qty: 30 tablet, Refills: 1    Associated Diagnoses: Cervical discitis      spironolactone (ALDACTONE) 25 MG tablet Take 0.5 tablets (12.5 mg) by mouth daily  Qty: 15 tablet, Refills: 1    Associated Diagnoses: Congestive heart failure, unspecified HF chronicity, unspecified heart failure type (H)           CONTINUE these medications which have NOT CHANGED    Details   aspirin (ASPIRIN LOW DOSE) 81 MG EC tablet [ASPIRIN (ASPIRIN LOW DOSE) 81 MG EC TABLET] Take 81 mg by  mouth daily.      levothyroxine (SYNTHROID, LEVOTHROID) 50 MCG tablet [LEVOTHYROXINE (SYNTHROID, LEVOTHROID) 50 MCG TABLET] Take 50 mcg by mouth daily.      lovastatin (MEVACOR) 20 MG tablet [LOVASTATIN (MEVACOR) 20 MG TABLET] Take 20 mg by mouth at bedtime.      multivitamin therapeutic tablet [MULTIVITAMIN THERAPEUTIC TABLET] Take 1 tablet by mouth daily.      omega 3-dha-epa-fish oil (FISH OIL) 1,000 mg (120 mg-180 mg) cap [OMEGA 3-DHA-EPA-FISH OIL (FISH OIL) 1,000 MG (120 MG-180 MG) CAP] Take by mouth.      oxyBUTYnin ER (DITROPAN XL) 10 MG 24 hr tablet Take 10 mg by mouth daily           STOP taking these medications       ibuprofen (ADVIL/MOTRIN) 200 MG tablet Comments:   Reason for Stopping:             Allergies   Allergies   Allergen Reactions    Hydroxychloroquine Itching    Ampicillin Rash    Naproxen Itching

## 2023-09-29 NOTE — PLAN OF CARE
Physical Therapy Discharge Summary    Reason for therapy discharge:    Discharged to transitional care facility.    Progress towards therapy goal(s). See goals on Care Plan in [facility] electronic health record for goal details.  Goals partially met.  Barriers to achieving goals:   discharge from facility.    Therapy recommendation(s):    Recommend continued therapies to improve overall strength, balance and mobility.       Jelly Lind, PT, DPT  9/29/2023

## 2023-09-29 NOTE — PROGRESS NOTES
Care Management Discharge Note    Discharge Date: 09/29/2023       Discharge Disposition: Transitional Care - [facility] [place]    Discharge Services: None    Discharge DME: None    Discharge Transportation: family or friend will provide    Private pay costs discussed: Not applicable    Does the patient's insurance plan have a 3 day qualifying hospital stay waiver?  No    PAS Confirmation Code: [id number]  Patient/family educated on Medicare website which has current facility and service quality ratings: yes    Education Provided on the Discharge Plan:  yes  Persons Notified of Discharge Plans: patient,  Davidson  Patient/Family in Agreement with the Plan: yes    Handoff Referral Completed: Yes    Additional Information:  Patient is ready for discharge today and she has been accepted at [facility] in [place]. She will complete a six week course of antibiotics there. She is ready for discharge today. This writer spoke with her  Davidson via phone. He is in agreement with plan and can transport her today at 1300. This writer updated patient's nurse Sylwia. Will send orders to facility as soon as they are completed. PAS [id number].      CHEO BlackwellSW

## 2023-09-29 NOTE — DISCHARGE INSTRUCTIONS
Patient is advised to have a follow-up with PCP, cardiology, infectious disease within 2 to 4 weeks s/p discharge, advised to have a follow-up repeat CBC, BMP and CRP done weekly until patient is on IV antibiotics s/p discharge, is advised to have increased activity and ambulation as tolerated, is advised if she develop any significant worsening of symptoms that initially led to hospitalization or if she develop any significant chest pain, shortness of breath, palpitation, weakness, fever or chills associated with fatigue or bleeding from any source to immediately come back to the ER, would be discharged on cefazolin every 8 hourly for 6 weeks course along with Bumex, losartan, spironolactone, may use Robaxin for muscle spasm as needed, holding ibuprofen, continue rest of the medication as prescribed until follow-up with PCP for further medication recommendations, reconciliation and refills.  Patient has a large hiatal hernia and would need to follow-up with surgery as outpatient along with following up with PCP for lung nodule follow-up with repeat CT scan of the chest in 4 to 6 weeks as outpatient s/p discharge, would advise patient to return to the hospital if developing any right upper quadrant abdominal pain for further evaluation and management.

## 2023-09-29 NOTE — PLAN OF CARE
Problem: Plan of Care - These are the overarching goals to be used throughout the patient stay.    Goal: Absence of Hospital-Acquired Illness or Injury  Intervention: Identify and Manage Fall Risk  Recent Flowsheet Documentation  Taken 9/29/2023 0006 by Mel Dao, RN  Safety Promotion/Fall Prevention: activity supervised  Taken 9/28/2023 2058 by Mel Dao, RN  Safety Promotion/Fall Prevention: activity supervised   Goal Outcome Evaluation:       Patient continued on IV antibiotic therapy for bacteremia. Afebrile. Patient managed pain with scheduled Tylenol, lidocaine patch and heating pad. Patient appeared alert and oriented x4, able to interact with staff and communicate her needs appropriately.

## 2023-09-30 ENCOUNTER — TRANSITIONAL CARE UNIT VISIT (OUTPATIENT)
Dept: GERIATRICS | Facility: CLINIC | Age: 83
End: 2023-09-30
Payer: COMMERCIAL

## 2023-09-30 VITALS
HEART RATE: 85 BPM | SYSTOLIC BLOOD PRESSURE: 112 MMHG | OXYGEN SATURATION: 92 % | WEIGHT: 258.4 LBS | BODY MASS INDEX: 47.55 KG/M2 | DIASTOLIC BLOOD PRESSURE: 60 MMHG | TEMPERATURE: 98.2 F | RESPIRATION RATE: 17 BRPM | HEIGHT: 62 IN

## 2023-09-30 DIAGNOSIS — Z78.9 IMPAIRED MOBILITY AND ACTIVITIES OF DAILY LIVING: ICD-10-CM

## 2023-09-30 DIAGNOSIS — R52 PAIN: ICD-10-CM

## 2023-09-30 DIAGNOSIS — K59.00 CONSTIPATION, UNSPECIFIED CONSTIPATION TYPE: ICD-10-CM

## 2023-09-30 DIAGNOSIS — M46.42 CERVICAL DISCITIS: Primary | ICD-10-CM

## 2023-09-30 DIAGNOSIS — Z74.09 IMPAIRED MOBILITY AND ACTIVITIES OF DAILY LIVING: ICD-10-CM

## 2023-09-30 DIAGNOSIS — R78.81 BACTEREMIA: ICD-10-CM

## 2023-09-30 PROCEDURE — 99309 SBSQ NF CARE MODERATE MDM 30: CPT | Performed by: NURSE PRACTITIONER

## 2023-09-30 NOTE — PROGRESS NOTES
[facility] GERIATRICS    PRIMARY CARE PROVIDER AND CLINIC:  Jerzy Spencer MD, [address] / [place] [address]  Chief Complaint   Patient presents with    Hospital F/U      [place] Medical Record Number:  [id number]  Place of Service where encounter took place:  [facility] ([facility]) [[id number]]    Makayla Dow  is a 83 year old  (1940), admitted to the above facility from  [facility]. Hospital stay 9/19/23 through 9/29/23..     HPI:    Brief Summary of Hospital Course: Treated for sepsis secondary to MSSA bacteremia.  Complicated by pulmonary edema and acute hypoxic respiratory failure, mild LAURE, acute neck pain.     MRI cervical spine 9/25: Fluid in the left C3-C4 facet joint with surrounding inflammatory change. Septic arthritis is a primary consideration as the degree of inflammation is is greater than expected for degenerative arthropathy. Epidural enhancement at C3-C4 could reflect reactive change versus early phlegmon formation. No epidural abscess/drainable collection. Multilevel degenerative changes with multilevel cord flattening and moderate canal stenosis at C5-C6 and C6-C7.4.  No abnormal cord signal.  Consulted neurosurgery: No surgical intervention recommended at this time, recommend evaluation by IR if aspiration of left C3-4 joint is possible to obtain cultures, consult order placed. Soft collar can be provided for comfort as needed. Recommend outpatient follow-up in neurosurgery clinic for evaluation of her cervical stenosis and discussion of symptoms of myelopathy as a nonurgent visit once her infectious symptoms have resolved. Neurosurgery clinic will help arrange this visit. Neurosurgery signed off.  9/27 Consulted IR. S/p Left cervical three-four facet joint aspiration.  Specimen sent for further analysis  On iv ancef - started 9/21 - for MSSA bacteremia 9/19, discontinued iv vancomycin  Follow synovial cultures sent 9/27/2023, ID  following  Will need at least 6 weeks of IV Ancef outpatient     MEDICATION CHANGES: Start Bumex, Ancef, losartan, methocarbamol, spironolactone.  Stop ibuprofen.  RECOMMENDED FOLLOW UP: 3 to 4 weeks with infectious disease / neurosurgery for neck pain  PCP for Large hiatal hernia - follow up with surgery as outpatient  7mm right lung base nodule - Follow up with PMD/ Pulm outpatient  Updates on Status Since Skilled nursing Admission: None    Today: Patient reports slight pain in the neck.  Agrees to scheduled Tylenol.  Complains of constipation and agrees to scheduled senna.  Denies problems with sleeping or appetite.  Denies problems with shortness of breath or chest pain.  Denies skin concerns.    External notes reviewed: Facility EHR including progress notes, vital signs. Hospital discharge summary reviewed. Hospital discharge orders reviewed.     CODE STATUS/ADVANCE DIRECTIVES DISCUSSION: Full code    ALLERGIES:   Allergies   Allergen Reactions    Hydroxychloroquine Itching    Ampicillin Rash    Naproxen Itching      PAST MEDICAL HISTORY: No past medical history on file.   PAST SURGICAL HISTORY:   has a past surgical history that includes IR Bone Biopsy Vertebral (9/27/2023) and PICC/Midline Placement (9/28/2023).  FAMILY HISTORY: family history is not on file.  SOCIAL HISTORY:   reports that she has never smoked. She does not have any smokeless tobacco history on file. She reports that she does not currently use alcohol.    Post Discharge Medication Reconciliation Status: discharge medications reconciled, continue medications without change  Current Outpatient Medications   Medication Sig    aspirin (ASPIRIN LOW DOSE) 81 MG EC tablet [ASPIRIN (ASPIRIN LOW DOSE) 81 MG EC TABLET] Take 81 mg by mouth daily.    bumetanide (BUMEX) 2 MG tablet Take 1 tablet (2 mg) by mouth daily    ceFAZolin (ANCEF) intermittent infusion 2 g in 100 mL dextrose PRE-MIX Inject 100 mLs (2 g) into the vein every 8 hours for 42 days     "levothyroxine (SYNTHROID, LEVOTHROID) 50 MCG tablet [LEVOTHYROXINE (SYNTHROID, LEVOTHROID) 50 MCG TABLET] Take 50 mcg by mouth daily.    losartan (COZAAR) 25 MG tablet Take 1 tablet (25 mg) by mouth daily    lovastatin (MEVACOR) 20 MG tablet [LOVASTATIN (MEVACOR) 20 MG TABLET] Take 20 mg by mouth at bedtime.    methocarbamol (ROBAXIN) 500 MG tablet Take 1 tablet (500 mg) by mouth 3 times daily as needed for muscle spasms    multivitamin therapeutic tablet [MULTIVITAMIN THERAPEUTIC TABLET] Take 1 tablet by mouth daily.    omega 3-dha-epa-fish oil (FISH OIL) 1,000 mg (120 mg-180 mg) cap [OMEGA 3-DHA-EPA-FISH OIL (FISH OIL) 1,000 MG (120 MG-180 MG) CAP] Take by mouth.    oxyBUTYnin ER (DITROPAN XL) 10 MG 24 hr tablet Take 10 mg by mouth daily    spironolactone (ALDACTONE) 25 MG tablet Take 0.5 tablets (12.5 mg) by mouth daily     No current facility-administered medications for this visit.       ROS:  4 point ROS including Respiratory, CV, GI and , other than that noted in the HPI,  is negative    Vitals:  /60   Pulse 85   Temp 98.2  F (36.8  C)   Resp 17   Ht 1.575 m (5' 2\")   Wt 117.2 kg (258 lb 6.4 oz)   SpO2 92%   BMI 47.26 kg/m    Exam:  GENERAL APPEARANCE:  Alert, in no distress  RESP:  respiratory effort normal   M/S:  Gait and station sitting in chair.   SKIN:  Inspection and palpation of skin and subcutaneous tissue at baseline  PSYCH:  insight and judgement, memory seems intact, affect and mood normal    Lab/Diagnostic data: Pertinent hospital labs: 9/29 creatinine 0.88 GFR 64 hemoglobin 11.4    ASSESSMENT/PLAN:  Cervical discitis  Admitted to the TCU for therapy and nursing care following a hospital stay for sepsis.  -Follow-up with neurosurgery  -Add acetaminophen at 1000 mg 3 times a day  -Continue PT/Ocupational Therapy  -Nursing for monitoring and IV antibiotics  - following for discharge planning    Constipation, unspecified constipation type  -Start senna S     Impaired " mobility and activities of daily living  -Continue PT/OT    Bacteremia  -Continue IV antibiotics  -Follow-up with infectious disease as directed by them  -Weekly labs as ordered by infectious disease.     Pain  Reviewed by ibuprofen was stopped and we recommend Tylenol.  - Acetaminophen 1,000 mg by mouth three times daily and 1,000 mg by mouth once daily as needed.    -Continue Robaxin as needed for pain    Orders:  - Acetaminophen 1,000 mg by mouth three times daily and 1,000 mg by mouth once daily as needed.   -Senna 1 tab p.o. twice daily as needed    Electronically signed by: Elva Cota NP

## 2023-09-30 NOTE — LETTER
9/30/2023        RE: Makayla Dow  [address] [place] [address]        [facility] GERIATRICS    PRIMARY CARE PROVIDER AND CLINIC:  Jerzy Spencer MD, [address] [facility] / [place] [address]  Chief Complaint   Patient presents with     Hospital F/U      [facility] Medical Record Number:  [id number]  Place of Service where encounter took place:  [facility] ([facility]) [[id number]]    Makayla Dow  is a 83 year old  (1940), admitted to the above facility from  [facility]. Hospital stay 9/19/23 through 9/29/23..     HPI:    Brief Summary of Hospital Course: Treated for sepsis secondary to MSSA bacteremia.  Complicated by pulmonary edema and acute hypoxic respiratory failure, mild LAURE, acute neck pain.     MRI cervical spine 9/25: Fluid in the left C3-C4 facet joint with surrounding inflammatory change. Septic arthritis is a primary consideration as the degree of inflammation is is greater than expected for degenerative arthropathy. Epidural enhancement at C3-C4 could reflect reactive change versus early phlegmon formation. No epidural abscess/drainable collection. Multilevel degenerative changes with multilevel cord flattening and moderate canal stenosis at C5-C6 and C6-C7.4.  No abnormal cord signal.  Consulted neurosurgery: No surgical intervention recommended at this time, recommend evaluation by IR if aspiration of left C3-4 joint is possible to obtain cultures, consult order placed. Soft collar can be provided for comfort as needed. Recommend outpatient follow-up in neurosurgery clinic for evaluation of her cervical stenosis and discussion of symptoms of myelopathy as a nonurgent visit once her infectious symptoms have resolved. Neurosurgery clinic will help arrange this visit. Neurosurgery signed off.  9/27 Consulted IR. S/p Left cervical three-four facet joint aspiration.  Specimen sent for further analysis  On iv ancef - started 9/21 -  for MSSA bacteremia 9/19, discontinued iv vancomycin  Follow synovial cultures sent 9/27/2023, ID following  Will need at least 6 weeks of IV Ancef outpatient     MEDICATION CHANGES: Start Bumex, Ancef, losartan, methocarbamol, spironolactone.  Stop ibuprofen.  RECOMMENDED FOLLOW UP: 3 to 4 weeks with infectious disease / neurosurgery for neck pain  PCP for Large hiatal hernia - follow up with surgery as outpatient  7mm right lung base nodule - Follow up with PMD/ Pulm outpatient  Updates on Status Since Skilled nursing Admission: None    Today: Patient reports slight pain in the neck.  Agrees to scheduled Tylenol.  Complains of constipation and agrees to scheduled senna.  Denies problems with sleeping or appetite.  Denies problems with shortness of breath or chest pain.  Denies skin concerns.    External notes reviewed: Facility EHR including progress notes, vital signs. Hospital discharge summary reviewed. Hospital discharge orders reviewed.     CODE STATUS/ADVANCE DIRECTIVES DISCUSSION: Full code    ALLERGIES:   Allergies   Allergen Reactions     Hydroxychloroquine Itching     Ampicillin Rash     Naproxen Itching      PAST MEDICAL HISTORY: No past medical history on file.   PAST SURGICAL HISTORY:   has a past surgical history that includes IR Bone Biopsy Vertebral (9/27/2023) and PICC/Midline Placement (9/28/2023).  FAMILY HISTORY: family history is not on file.  SOCIAL HISTORY:   reports that she has never smoked. She does not have any smokeless tobacco history on file. She reports that she does not currently use alcohol.    Post Discharge Medication Reconciliation Status: discharge medications reconciled, continue medications without change  Current Outpatient Medications   Medication Sig     aspirin (ASPIRIN LOW DOSE) 81 MG EC tablet [ASPIRIN (ASPIRIN LOW DOSE) 81 MG EC TABLET] Take 81 mg by mouth daily.     bumetanide (BUMEX) 2 MG tablet Take 1 tablet (2 mg) by mouth daily     ceFAZolin (ANCEF) intermittent  "infusion 2 g in 100 mL dextrose PRE-MIX Inject 100 mLs (2 g) into the vein every 8 hours for 42 days     levothyroxine (SYNTHROID, LEVOTHROID) 50 MCG tablet [LEVOTHYROXINE (SYNTHROID, LEVOTHROID) 50 MCG TABLET] Take 50 mcg by mouth daily.     losartan (COZAAR) 25 MG tablet Take 1 tablet (25 mg) by mouth daily     lovastatin (MEVACOR) 20 MG tablet [LOVASTATIN (MEVACOR) 20 MG TABLET] Take 20 mg by mouth at bedtime.     methocarbamol (ROBAXIN) 500 MG tablet Take 1 tablet (500 mg) by mouth 3 times daily as needed for muscle spasms     multivitamin therapeutic tablet [MULTIVITAMIN THERAPEUTIC TABLET] Take 1 tablet by mouth daily.     omega 3-dha-epa-fish oil (FISH OIL) 1,000 mg (120 mg-180 mg) cap [OMEGA 3-DHA-EPA-FISH OIL (FISH OIL) 1,000 MG (120 MG-180 MG) CAP] Take by mouth.     oxyBUTYnin ER (DITROPAN XL) 10 MG 24 hr tablet Take 10 mg by mouth daily     spironolactone (ALDACTONE) 25 MG tablet Take 0.5 tablets (12.5 mg) by mouth daily     No current facility-administered medications for this visit.       ROS:  4 point ROS including Respiratory, CV, GI and , other than that noted in the HPI,  is negative    Vitals:  /60   Pulse 85   Temp 98.2  F (36.8  C)   Resp 17   Ht 1.575 m (5' 2\")   Wt 117.2 kg (258 lb 6.4 oz)   SpO2 92%   BMI 47.26 kg/m    Exam:  GENERAL APPEARANCE:  Alert, in no distress  RESP:  respiratory effort normal   M/S:  Gait and station sitting in chair.   SKIN:  Inspection and palpation of skin and subcutaneous tissue at baseline  PSYCH:  insight and judgement, memory seems intact, affect and mood normal    Lab/Diagnostic data: Pertinent hospital labs: 9/29 creatinine 0.88 GFR 64 hemoglobin 11.4    ASSESSMENT/PLAN:  Cervical discitis  Admitted to the TCU for therapy and nursing care following a hospital stay for sepsis.  -Follow-up with neurosurgery  -Add acetaminophen at 1000 mg 3 times a day  -Continue PT/Ocupational Therapy  -Nursing for monitoring and IV antibiotics  - " following for discharge planning    Constipation, unspecified constipation type  -Start senna S     Impaired mobility and activities of daily living  -Continue PT/OT    Bacteremia  -Continue IV antibiotics  -Follow-up with infectious disease as directed by them  -Weekly labs as ordered by infectious disease.     Pain  Reviewed by ibuprofen was stopped and we recommend Tylenol.  - Acetaminophen 1,000 mg by mouth three times daily and 1,000 mg by mouth once daily as needed.    -Continue Robaxin as needed for pain    Orders:  - Acetaminophen 1,000 mg by mouth three times daily and 1,000 mg by mouth once daily as needed.   -Senna 1 tab p.o. twice daily as needed    Electronically signed by: Elva Cota NP      Sincerely,        Elva Cota NP

## 2023-10-02 ENCOUNTER — LAB REQUISITION (OUTPATIENT)
Dept: LAB | Facility: CLINIC | Age: 83
End: 2023-10-02
Payer: COMMERCIAL

## 2023-10-02 DIAGNOSIS — Z11.1 ENCOUNTER FOR SCREENING FOR RESPIRATORY TUBERCULOSIS: ICD-10-CM

## 2023-10-02 DIAGNOSIS — R78.81 BACTEREMIA: ICD-10-CM

## 2023-10-02 LAB
BACTERIA SNV CULT: NO GROWTH
GRAM STAIN RESULT: NORMAL
GRAM STAIN RESULT: NORMAL

## 2023-10-02 NOTE — TELEPHONE ENCOUNTER
10.02- lmtcb x1 with spouse to have patient call to schedule follow up. Spouse number is listed as the cell number  and the primary contact number is no longer in service.

## 2023-10-03 ENCOUNTER — TRANSITIONAL CARE UNIT VISIT (OUTPATIENT)
Dept: GERIATRICS | Facility: CLINIC | Age: 83
End: 2023-10-03
Payer: COMMERCIAL

## 2023-10-03 VITALS
DIASTOLIC BLOOD PRESSURE: 72 MMHG | HEART RATE: 86 BPM | RESPIRATION RATE: 17 BRPM | SYSTOLIC BLOOD PRESSURE: 159 MMHG | TEMPERATURE: 97.8 F | OXYGEN SATURATION: 91 %

## 2023-10-03 DIAGNOSIS — Z78.9 IMPAIRED MOBILITY AND ACTIVITIES OF DAILY LIVING: ICD-10-CM

## 2023-10-03 DIAGNOSIS — R78.81 BACTEREMIA: ICD-10-CM

## 2023-10-03 DIAGNOSIS — I50.30 HEART FAILURE WITH PRESERVED EJECTION FRACTION, NYHA CLASS I (H): ICD-10-CM

## 2023-10-03 DIAGNOSIS — R52 PAIN: ICD-10-CM

## 2023-10-03 DIAGNOSIS — M46.42 CERVICAL DISCITIS: Primary | ICD-10-CM

## 2023-10-03 DIAGNOSIS — I10 ESSENTIAL HYPERTENSION: ICD-10-CM

## 2023-10-03 DIAGNOSIS — Z74.09 IMPAIRED MOBILITY AND ACTIVITIES OF DAILY LIVING: ICD-10-CM

## 2023-10-03 DIAGNOSIS — K59.00 CONSTIPATION, UNSPECIFIED CONSTIPATION TYPE: ICD-10-CM

## 2023-10-03 LAB
ANION GAP SERPL CALCULATED.3IONS-SCNC: 10 MMOL/L (ref 7–15)
BASO+EOS+MONOS # BLD AUTO: ABNORMAL 10*3/UL
BASO+EOS+MONOS NFR BLD AUTO: ABNORMAL %
BASOPHILS # BLD AUTO: 0.1 10E3/UL (ref 0–0.2)
BASOPHILS NFR BLD AUTO: 1 %
BUN SERPL-MCNC: 22.7 MG/DL (ref 8–23)
CALCIUM SERPL-MCNC: 8.7 MG/DL (ref 8.8–10.2)
CHLORIDE SERPL-SCNC: 102 MMOL/L (ref 98–107)
CHOLEST SERPL-MCNC: 155 MG/DL
CREAT SERPL-MCNC: 0.76 MG/DL (ref 0.51–0.95)
CRP SERPL-MCNC: 18.3 MG/L
DEPRECATED HCO3 PLAS-SCNC: 30 MMOL/L (ref 22–29)
EGFRCR SERPLBLD CKD-EPI 2021: 76 ML/MIN/1.73M2
EOSINOPHIL # BLD AUTO: 0.5 10E3/UL (ref 0–0.7)
EOSINOPHIL NFR BLD AUTO: 9 %
ERYTHROCYTE [DISTWIDTH] IN BLOOD BY AUTOMATED COUNT: 13.1 % (ref 10–15)
GLUCOSE SERPL-MCNC: 88 MG/DL (ref 70–99)
HCT VFR BLD AUTO: 33.9 % (ref 35–47)
HDLC SERPL-MCNC: 45 MG/DL
HGB BLD-MCNC: 10.5 G/DL (ref 11.7–15.7)
IMM GRANULOCYTES # BLD: 0.1 10E3/UL
IMM GRANULOCYTES NFR BLD: 1 %
LDLC SERPL CALC-MCNC: 86 MG/DL
LYMPHOCYTES # BLD AUTO: 1.3 10E3/UL (ref 0.8–5.3)
LYMPHOCYTES NFR BLD AUTO: 23 %
MCH RBC QN AUTO: 30 PG (ref 26.5–33)
MCHC RBC AUTO-ENTMCNC: 31 G/DL (ref 31.5–36.5)
MCV RBC AUTO: 97 FL (ref 78–100)
MONOCYTES # BLD AUTO: 0.7 10E3/UL (ref 0–1.3)
MONOCYTES NFR BLD AUTO: 13 %
NEUTROPHILS # BLD AUTO: 3 10E3/UL (ref 1.6–8.3)
NEUTROPHILS NFR BLD AUTO: 53 %
NONHDLC SERPL-MCNC: 110 MG/DL
NRBC # BLD AUTO: 0 10E3/UL
NRBC BLD AUTO-RTO: 0 /100
PLATELET # BLD AUTO: 351 10E3/UL (ref 150–450)
POTASSIUM SERPL-SCNC: 3.7 MMOL/L (ref 3.4–5.3)
RBC # BLD AUTO: 3.5 10E6/UL (ref 3.8–5.2)
SODIUM SERPL-SCNC: 142 MMOL/L (ref 135–145)
TRIGL SERPL-MCNC: 122 MG/DL
WBC # BLD AUTO: 5.6 10E3/UL (ref 4–11)

## 2023-10-03 PROCEDURE — 85025 COMPLETE CBC W/AUTO DIFF WBC: CPT | Mod: ORL | Performed by: FAMILY MEDICINE

## 2023-10-03 PROCEDURE — 80061 LIPID PANEL: CPT | Performed by: FAMILY MEDICINE

## 2023-10-03 PROCEDURE — 99309 SBSQ NF CARE MODERATE MDM 30: CPT | Performed by: NURSE PRACTITIONER

## 2023-10-03 PROCEDURE — 86140 C-REACTIVE PROTEIN: CPT | Performed by: FAMILY MEDICINE

## 2023-10-03 PROCEDURE — 80048 BASIC METABOLIC PNL TOTAL CA: CPT | Mod: ORL | Performed by: FAMILY MEDICINE

## 2023-10-03 PROCEDURE — 86481 TB AG RESPONSE T-CELL SUSP: CPT | Mod: ORL | Performed by: FAMILY MEDICINE

## 2023-10-03 PROCEDURE — P9604 ONE-WAY ALLOW PRORATED TRIP: HCPCS | Mod: ORL | Performed by: FAMILY MEDICINE

## 2023-10-03 PROCEDURE — 36415 COLL VENOUS BLD VENIPUNCTURE: CPT | Mod: ORL | Performed by: FAMILY MEDICINE

## 2023-10-03 NOTE — TELEPHONE ENCOUNTER
Patient is currently scheduled as New Patient on Dr. Reinoso's schedule. Patient was seen by Dr Carmencita BEAL and should follow up with Dr. Tse instead.

## 2023-10-03 NOTE — LETTER
10/3/2023        RE: Makayla Dow  [address] [place] [address]        [facility]    Chief Complaint   Patient presents with     RECHECK     HPI:  Makayla Dow is a 83 year old  (1940), who is being seen today for an episodic care visit at: [facility] ([facility]) [[id number]].    Brief Summary of Hospital Course: Treated for sepsis secondary to MSSA bacteremia.  Complicated by pulmonary edema and acute hypoxic respiratory failure, mild LAURE, acute neck pain. MRI of cervical spine 9/25 showed fluid in the left C3-C4 facet joint with surrounding inflammatory change. Septic arthritis is a primary consideration as the degree of inflammation is is greater than expected for degenerative arthropathy. Neurosurgery was consulted. No surgical intervention was recommended, but IR was able to perform aspiration of left C3-4 joint. IV antibiotics were narrowed to ancef. ID recommended at least 6 weeks of IV Ancef outpatient.   MEDICATION CHANGES: Started Bumex, Ancef, losartan, methocarbamol, spironolactone. Stopped ibuprofen.  RECOMMENDED FOLLOW UP: 3 to 4 weeks with infectious disease / neurosurgery for neck pain  PCP for Large hiatal hernia - follow up with surgery as outpatient  7mm right lung base nodule - Follow up with PMD/ Pulm outpatient    Updates on Status Since Skilled nursing Admission:   9/30  - Acetaminophen 1,000 mg by mouth three times daily and 1,000 mg by mouth once daily as needed.   -Senna 1 tab p.o. twice daily as needed    Today, she is feeling well. Reports that her neck pain is improved and states she has better range of motion. Denies fevers, chills, or other worrisome constitutional symptoms. No shortness of breath or chest pain. Disappointed that she needs to stay at [facility] until her antibiotic therapy is completed, but is amenable to this. States working with therapy is going well. Having more regular bowel movements with the addition of senna last  week. No other acute concerns expressed.   Therapy reports transfers are with standby assist.  Ambulating 300 feet with 2 wheeled walker.  Upper body dressing is independent lower body dressing is standby assist.  Slums 14/30.  They plan to do a CPT.,  Patient lives at home with spouse and has multiple levels.  Due to patient's cognition and unclear support at home will need ongoing care at the TCU for IV antibiotics.    Allergies, and PMH/PSH reviewed in [facility] today.  REVIEW OF SYSTEMS:  10 point ROS of systems including Constitutional, Eyes, Respiratory, Cardiovascular, Gastroenterology, Genitourinary, Integumentary, Musculoskeletal, Psychiatric were all negative except for pertinent positives noted in my HPI.    Objective:   BP (!) 159/72   Pulse 86   Temp 97.8  F (36.6  C)   Resp 17   SpO2 91%   GENERAL APPEARANCE:  Alert, in no distress, oriented, cooperative  ENT:  normal hearing acuity, oral mucous membranes moist  EYES:  EOM normal, conjunctiva and lids normal  RESP:  respiratory effort and palpation of chest normal, lungs clear to auscultation   CV:  Palpation and auscultation of heart done , regular rate and rhythm, no murmur, rub, or gallop, no edema, +2 pedal pulses, no murmur  ABDOMEN:  no guarding or rebound, bowel sounds normal  M/S:   Sitting comfortably in chair, uses front wheeled walker  SKIN:  Inspection of skin and subcutaneous tissue baseline, Palpation of skin and subcutaneous tissue baseline  PSYCH:  oriented X 3, normal insight, judgement and memory, affect and mood normal    Recent labs in [facility] reviewed by me today.     Assessment/Plan:  (M46.42) Cervical discitis  (primary encounter diagnosis)  (R78.81) Bacteremia  (R52) Pain  Comment: Improving. Blood cultures were positive upon admission, but subsequent draws have all been no growth to date. S/p aspiration of Left C3/C4 facet joint 9/27. Pain and mobility are improving with ongoing antibiotic therapy.   Plan:   -Continue 6 week course  of ancef  -Follow up with ID as scheduled  -Continue scheduled tylenol TID  -Continue PRN methocarbamol; she has not needed this since TCU admit, consider discontinuing at following visits.     (K59.00) Constipation, unspecified constipation type  Comment: Improved, soft/formed BM today. Continue PRN senna.    (Z74.09,  Z78.9) Impaired mobility and activities of daily living  Comment: Admitted to TCU for ongoing ancef treatment and deconditioning. Therapy staff report that she is doing well and making progress.   Plan:   -Continue PT/Ocupational Therapy  -Nursing following for monitoring  - following for discharge planning    (I10) Essential hypertension  (I50.30) Heart failure with preserved ejection fraction, NYHA class I (H)  Comment: Chronic, stable. Well compensated. She does not appear to be fluid overloaded. BP trends show controlled BP with readings ranging 100s-120s systolic.  Plan:   -Continue Losartan 25mg daily  -Continue spironolactone 12.5 mg daily  -Continue maintenance Bumex 2 mg daily      Orders:  No new orders.     Electronically signed by:  Kev Siegel, RN, BSN, Student Nurse Practitioner  [facility]    I (the provider) was present with professional student who participated in this service and in the documentation of this service. I have verified the HPI/ROS, history, and personally performed the physical exam and the healthcare decision making. I agree with all elements as documented above.      Electronically signed by:  Elva Cota NP        Sincerely,        Elva Cota NP

## 2023-10-03 NOTE — PROGRESS NOTES
[facility] GERIATRICS    Chief Complaint   Patient presents with    RECHECK     HPI:  Makayla Dow is a 83 year old  (1940), who is being seen today for an episodic care visit at: [facility] ([facility]) [[id number]].    Brief Summary of Hospital Course: Treated for sepsis secondary to MSSA bacteremia.  Complicated by pulmonary edema and acute hypoxic respiratory failure, mild LAURE, acute neck pain. MRI of cervical spine 9/25 showed fluid in the left C3-C4 facet joint with surrounding inflammatory change. Septic arthritis is a primary consideration as the degree of inflammation is is greater than expected for degenerative arthropathy. Neurosurgery was consulted. No surgical intervention was recommended, but IR was able to perform aspiration of left C3-4 joint. IV antibiotics were narrowed to ancef. ID recommended at least 6 weeks of IV Ancef outpatient.   MEDICATION CHANGES: Started Bumex, Ancef, losartan, methocarbamol, spironolactone. Stopped ibuprofen.  RECOMMENDED FOLLOW UP: 3 to 4 weeks with infectious disease / neurosurgery for neck pain  PCP for Large hiatal hernia - follow up with surgery as outpatient  7mm right lung base nodule - Follow up with PMD/ Pulm outpatient    Updates on Status Since Skilled nursing Admission:   9/30  - Acetaminophen 1,000 mg by mouth three times daily and 1,000 mg by mouth once daily as needed.   -Senna 1 tab p.o. twice daily as needed    Today, she is feeling well. Reports that her neck pain is improved and states she has better range of motion. Denies fevers, chills, or other worrisome constitutional symptoms. No shortness of breath or chest pain. Disappointed that she needs to stay at [facility] until her antibiotic therapy is completed, but is amenable to this. States working with therapy is going well. Having more regular bowel movements with the addition of senna last week. No other acute concerns expressed.   Therapy reports transfers are with standby assist.   Ambulating 300 feet with 2 wheeled walker.  Upper body dressing is independent lower body dressing is standby assist.  Slums 14/30.  They plan to do a CPT.,  Patient lives at home with spouse and has multiple levels.  Due to patient's cognition and unclear support at home will need ongoing care at the TCU for IV antibiotics.    Allergies, and PMH/PSH reviewed in [facility] today.  REVIEW OF SYSTEMS:  10 point ROS of systems including Constitutional, Eyes, Respiratory, Cardiovascular, Gastroenterology, Genitourinary, Integumentary, Musculoskeletal, Psychiatric were all negative except for pertinent positives noted in my HPI.    Objective:   BP (!) 159/72   Pulse 86   Temp 97.8  F (36.6  C)   Resp 17   SpO2 91%   GENERAL APPEARANCE:  Alert, in no distress, oriented, cooperative  ENT:  normal hearing acuity, oral mucous membranes moist  EYES:  EOM normal, conjunctiva and lids normal  RESP:  respiratory effort and palpation of chest normal, lungs clear to auscultation   CV:  Palpation and auscultation of heart done , regular rate and rhythm, no murmur, rub, or gallop, no edema, +2 pedal pulses, no murmur  ABDOMEN:  no guarding or rebound, bowel sounds normal  M/S:   Sitting comfortably in chair, uses front wheeled walker  SKIN:  Inspection of skin and subcutaneous tissue baseline, Palpation of skin and subcutaneous tissue baseline  PSYCH:  oriented X 3, normal insight, judgement and memory, affect and mood normal    Recent labs in [facility] reviewed by me today.     Assessment/Plan:  (M46.42) Cervical discitis  (primary encounter diagnosis)  (R78.81) Bacteremia  (R52) Pain  Comment: Improving. Blood cultures were positive upon admission, but subsequent draws have all been no growth to date. S/p aspiration of Left C3/C4 facet joint 9/27. Pain and mobility are improving with ongoing antibiotic therapy.   Plan:   -Continue 6 week course of ancef  -Follow up with ID as scheduled  -Continue scheduled tylenol TID  -Continue PRN  methocarbamol; she has not needed this since TCU admit, consider discontinuing at following visits.     (K59.00) Constipation, unspecified constipation type  Comment: Improved, soft/formed BM today. Continue PRN senna.    (Z74.09,  Z78.9) Impaired mobility and activities of daily living  Comment: Admitted to TCU for ongoing ancef treatment and deconditioning. Therapy staff report that she is doing well and making progress.   Plan:   -Continue PT/Ocupational Therapy  -Nursing following for monitoring  - following for discharge planning    (I10) Essential hypertension  (I50.30) Heart failure with preserved ejection fraction, NYHA class I (H)  Comment: Chronic, stable. Well compensated. She does not appear to be fluid overloaded. BP trends show controlled BP with readings ranging 100s-120s systolic.  Plan:   -Continue Losartan 25mg daily  -Continue spironolactone 12.5 mg daily  -Continue maintenance Bumex 2 mg daily      Orders:  No new orders.     Electronically signed by:  Kev Siegel, RN, BSN, Student Nurse Practitioner  [facility]    I (the provider) was present with professional student who participated in this service and in the documentation of this service. I have verified the HPI/ROS, history, and personally performed the physical exam and the healthcare decision making. I agree with all elements as documented above.      Electronically signed by:  Elva Cota NP

## 2023-10-04 LAB
GAMMA INTERFERON BACKGROUND BLD IA-ACNC: 0 IU/ML
M TB IFN-G BLD-IMP: NEGATIVE
M TB IFN-G CD4+ BCKGRND COR BLD-ACNC: 6.35 IU/ML
MITOGEN IGNF BCKGRD COR BLD-ACNC: 0 IU/ML
MITOGEN IGNF BCKGRD COR BLD-ACNC: 0.01 IU/ML
QUANTIFERON MITOGEN: 6.35 IU/ML
QUANTIFERON NIL TUBE: 0 IU/ML
QUANTIFERON TB1 TUBE: 0.01 IU/ML
QUANTIFERON TB2 TUBE: 0

## 2023-10-04 NOTE — TELEPHONE ENCOUNTER
No answer on cell #, phone rang multiple times and hung up. Per Epic note, Pt is in TCU at [facility], left message for Margaret RIVERA to call back and schedule follow-up with Dr. Tse.     See message below.

## 2023-10-05 ENCOUNTER — APPOINTMENT (OUTPATIENT)
Dept: CARDIOLOGY | Facility: CLINIC | Age: 83
End: 2023-10-05
Payer: COMMERCIAL

## 2023-10-05 ENCOUNTER — OFFICE VISIT (OUTPATIENT)
Dept: CARDIOLOGY | Facility: CLINIC | Age: 83
End: 2023-10-05
Payer: COMMERCIAL

## 2023-10-05 ENCOUNTER — TELEPHONE (OUTPATIENT)
Dept: GERIATRICS | Facility: CLINIC | Age: 83
End: 2023-10-05

## 2023-10-05 VITALS
HEART RATE: 70 BPM | OXYGEN SATURATION: 96 % | RESPIRATION RATE: 16 BRPM | BODY MASS INDEX: 42.95 KG/M2 | HEIGHT: 62 IN | WEIGHT: 233.4 LBS | SYSTOLIC BLOOD PRESSURE: 138 MMHG | DIASTOLIC BLOOD PRESSURE: 70 MMHG

## 2023-10-05 DIAGNOSIS — I10 PRIMARY HYPERTENSION: ICD-10-CM

## 2023-10-05 DIAGNOSIS — I50.32 CHRONIC HEART FAILURE WITH PRESERVED EJECTION FRACTION (H): Primary | ICD-10-CM

## 2023-10-05 DIAGNOSIS — E66.01 CLASS 3 SEVERE OBESITY DUE TO EXCESS CALORIES WITHOUT SERIOUS COMORBIDITY WITH BODY MASS INDEX (BMI) OF 45.0 TO 49.9 IN ADULT (H): ICD-10-CM

## 2023-10-05 DIAGNOSIS — M46.42 CERVICAL DISCITIS: Primary | ICD-10-CM

## 2023-10-05 DIAGNOSIS — E66.813 CLASS 3 SEVERE OBESITY DUE TO EXCESS CALORIES WITHOUT SERIOUS COMORBIDITY WITH BODY MASS INDEX (BMI) OF 45.0 TO 49.9 IN ADULT (H): ICD-10-CM

## 2023-10-05 PROCEDURE — 99214 OFFICE O/P EST MOD 30 MIN: CPT | Performed by: NURSE PRACTITIONER

## 2023-10-05 RX ORDER — CEFTRIAXONE SODIUM 1 G
1 VIAL (EA) INJECTION ONCE
Status: DISCONTINUED | OUTPATIENT
Start: 2023-10-05 | End: 2023-10-24

## 2023-10-05 NOTE — PATIENT INSTRUCTIONS
Makayla Dow,    It was a pleasure to see you today at [facility].     My recommendations after this visit include:  - Please follow up with Dr Rios in 2 months   - Continue current medications    Kasia Acosta, CNP

## 2023-10-05 NOTE — LETTER
10/5/2023    Jerzy Spencer MD  [address]  [place] [address]    RE: Makayla Dow       Dear Colleague,     I had the pleasure of seeing Makayla Dow in the [facility].        Assessment/Recommendations   Assessment:    1.  Heart failure with preserved ejection fraction, NYHA class II: Compensated.  She denies any acute heart failure symptoms.  Her dyspnea on exertion has improved.  Her lower extremity edema has resolved.  Her weight has decreased at TCU.  She is watching her sodium intake.  2.  Hypertension: Controlled.  Blood pressure 138/70  3.  Obesity: BMI 42.69.  She has lost weight since discharge    Plan:  1.  Continue current medications  2.  Continue low-salt diet and monitoring weights  3.  Continue regular activity    Makayla Dow will follow up with Dr. Rios in 2 months.     History of Present Illness/Subjective    Ms. Makayla Dow is a 83 year old female seen at [facility] heart failure clinic today for continued follow-up.  She follows up for heart failure with preserved ejection fraction.  She was hospitalized September 19 to September 29 with acute respiratory failure.  She also had a fever and was found to be septic due to MSSA bacteremia.  She was started on IV Lasix and symptoms improved.  She was started on IV antibiotics and will need 6 weeks of treatment.  She had an echocardiogram which showed ejection fraction of 60%.  She also had a Lexiscan due to elevated troponin which was negative for ischemia and low risk of future cardiac events.  She was discharged to TCU.  Past medical history significant for hypertension, hyperlipidemia, anemia, hypothyroidism, obesity.    Today, she states her dyspnea on exertion has improved.  Her lower extremity edema has resolved.  She denies lightheadedness, shortness of breath, orthopnea, PND, palpitations, chest pain, abdominal fullness/bloating, and lower extremity edema.   "    Her TCU is monitoring her weights and her weight was 232 pounds.  She is following a low sodium diet.  She participates in regular physical activity including rehab.  She normally lives with her  in her own home.      ECHOCARDIOGRAM: 9/20/2023  Interpretation Summary     1. Normal left ventricular size and systolic performance with a visually  estimated ejection fraction of 60%.  2. No significant valvular heart disease is identified on this study.  3. Normal right ventricular size and systolic performance.  4. There is mild biatrial enlargement.    Lexiscan: 9/22/2023  Result Text       A prior study was conducted on 8/27/2020.  This study has no change when compared with the prior study.    Pharmacological regadenoson stress ECG is negative for inducible myocardial ischemia.    Pharmacological Regadenonson nuclear stress test is probably abnormal.  There is mildly reduced radiotracer uptake in inferolateral segments from basal to mid segments which is consistent with previous nontransmural myocardial infarction.  There is no reversible change on stress and rest images indicating inducible myocardial ischemia.    Normal left ventricular size, wall motion and systolic function.  The calculated left ventricular ejection fraction is 70%.    The patient is at a low risk of future cardiac ischemic events.     Physical Examination Review of Systems   /70 (BP Location: Left arm, Patient Position: Sitting, Cuff Size: Adult Large)   Pulse 70   Resp 16   Ht 1.575 m (5' 2\")   Wt 105.9 kg (233 lb 6.4 oz)   SpO2 96%   BMI 42.69 kg/m    Body mass index is 42.69 kg/m .  Wt Readings from Last 3 Encounters:   10/05/23 105.9 kg (233 lb 6.4 oz)   09/30/23 117.2 kg (258 lb 6.4 oz)   09/27/23 117.2 kg (258 lb 6.1 oz)       General Appearance:   no acute distress   ENT/Mouth: No abnormalities   EYES:  no scleral icterus, normal conjunctivae   Neck: no thyromegaly   Chest/Lungs:   lungs are clear to auscultation, " "no rales or wheezing, equal chest wall expansion    Cardiovascular:   Regular. Normal first and second heart sounds with no murmurs, rubs, or gallops, no edema bilaterally    Abdomen:  Obese, bowel sounds are present   Extremities: no cyanosis or clubbing   Skin: warm   Neurologic: no tremors     Psychiatric: alert and oriented x3    Enc Vitals  BP: 138/70  Pulse: 70  Resp: 16  SpO2: 96 %  Weight: 105.9 kg (233 lb 6.4 oz)  Height: 157.5 cm (5' 2\")                                         Medical History  Surgical History Family History Social History   Past Medical History:   Diagnosis Date    Chronic heart failure with preserved ejection fraction (H) 10/05/2023    Congestive heart failure (H)     Hyperlipidemia     Hypertension     Obese     Primary hypertension 10/05/2023    Past Surgical History:   Procedure Laterality Date    IR BONE BIOPSY VERTEBRAL  9/27/2023    PICC SINGLE LUMEN PLACEMENT  9/28/2023    No family history on file. Social History     Socioeconomic History    Marital status:      Spouse name: Not on file    Number of children: Not on file    Years of education: Not on file    Highest education level: Not on file   Occupational History    Not on file   Tobacco Use    Smoking status: Never    Smokeless tobacco: Not on file   Substance and Sexual Activity    Alcohol use: Not Currently    Drug use: Not on file    Sexual activity: Not on file   Other Topics Concern    Not on file   Social History Narrative    Not on file     Social Determinants of Health     Financial Resource Strain: Not on file   Food Insecurity: Not on file   Transportation Needs: Not on file   Physical Activity: Not on file   Stress: Not on file   Social Connections: Not on file   Interpersonal Safety: Not on file   Housing Stability: Not on file          Medications  Allergies   Current Outpatient Medications   Medication Sig Dispense Refill    aspirin (ASPIRIN LOW DOSE) 81 MG EC tablet [ASPIRIN (ASPIRIN LOW DOSE) 81 MG EC " TABLET] Take 81 mg by mouth daily.      bumetanide (BUMEX) 2 MG tablet Take 1 tablet (2 mg) by mouth daily 30 tablet 1    ceFAZolin (ANCEF) intermittent infusion 2 g in 100 mL dextrose PRE-MIX Inject 100 mLs (2 g) into the vein every 8 hours for 42 days 36304 mL 0    levothyroxine (SYNTHROID, LEVOTHROID) 50 MCG tablet [LEVOTHYROXINE (SYNTHROID, LEVOTHROID) 50 MCG TABLET] Take 50 mcg by mouth daily.      losartan (COZAAR) 25 MG tablet Take 1 tablet (25 mg) by mouth daily 30 tablet 1    lovastatin (MEVACOR) 20 MG tablet [LOVASTATIN (MEVACOR) 20 MG TABLET] Take 20 mg by mouth at bedtime.      methocarbamol (ROBAXIN) 500 MG tablet Take 1 tablet (500 mg) by mouth 3 times daily as needed for muscle spasms 30 tablet 1    multivitamin therapeutic tablet [MULTIVITAMIN THERAPEUTIC TABLET] Take 1 tablet by mouth daily.      omega 3-dha-epa-fish oil (FISH OIL) 1,000 mg (120 mg-180 mg) cap [OMEGA 3-DHA-EPA-FISH OIL (FISH OIL) 1,000 MG (120 MG-180 MG) CAP] Take by mouth.      oxyBUTYnin ER (DITROPAN XL) 10 MG 24 hr tablet Take 10 mg by mouth daily      spironolactone (ALDACTONE) 25 MG tablet Take 0.5 tablets (12.5 mg) by mouth daily 15 tablet 1    Allergies   Allergen Reactions    Hydroxychloroquine Itching    Ampicillin Rash    Naproxen Itching         Lab Results    Chemistry/lipid CBC Cardiac Enzymes/BNP/TSH/INR   Lab Results   Component Value Date    CHOL 155 10/03/2023    HDL 45 (L) 10/03/2023    TRIG 122 10/03/2023    BUN 22.7 10/03/2023     10/03/2023    CO2 30 (H) 10/03/2023    Lab Results   Component Value Date    WBC 5.6 10/03/2023    HGB 10.5 (L) 10/03/2023    HCT 33.9 (L) 10/03/2023    MCV 97 10/03/2023     10/03/2023    Lab Results   Component Value Date    TROPONINI <0.01 08/27/2020     02/10/2022    TSH 2.98 09/20/2023    INR 1.14 09/26/2023             This note has been dictated using voice recognition software. Any grammatical, typographical, or context distortions are unintentional and  inherent to the software    30 minutes spent on the date of encounter doing chart review, review of outside records, review of test results, interpretation with above tests, patient visit, and documentation.                Thank you for allowing me to participate in the care of your patient.      Sincerely,     BECKY [facility]  cc:   Delfina Rios MD  [address] [address]

## 2023-10-06 NOTE — TELEPHONE ENCOUNTER
CC and HPI:  Nurse reports that PICC line clotted, can't flush--they tried for 1 hr is on Ancef q 8hr iv    VS: stable    ASSESSMENT and PLAN:    Give 1 gram I'm with lidocaine rocephin now  Order new PICC or iv access ASAP in the am  Update murray Cota NP    Call if further concerns as well as update patient's  provider/NP.    2145:   Got call back from nurse, picc line now working so hold off on Rocephin  and dont give.  Notify NP in am

## 2023-10-10 ENCOUNTER — TRANSITIONAL CARE UNIT VISIT (OUTPATIENT)
Dept: GERIATRICS | Facility: CLINIC | Age: 83
End: 2023-10-10
Payer: COMMERCIAL

## 2023-10-10 VITALS
TEMPERATURE: 98 F | HEIGHT: 62 IN | WEIGHT: 231.8 LBS | HEART RATE: 77 BPM | SYSTOLIC BLOOD PRESSURE: 121 MMHG | RESPIRATION RATE: 18 BRPM | BODY MASS INDEX: 42.65 KG/M2 | DIASTOLIC BLOOD PRESSURE: 72 MMHG | OXYGEN SATURATION: 93 %

## 2023-10-10 DIAGNOSIS — R78.81 BACTEREMIA: ICD-10-CM

## 2023-10-10 DIAGNOSIS — R52 PAIN: ICD-10-CM

## 2023-10-10 DIAGNOSIS — Z74.09 IMPAIRED MOBILITY AND ACTIVITIES OF DAILY LIVING: ICD-10-CM

## 2023-10-10 DIAGNOSIS — M46.42 CERVICAL DISCITIS: Primary | ICD-10-CM

## 2023-10-10 DIAGNOSIS — R91.8 PULMONARY NODULES: ICD-10-CM

## 2023-10-10 DIAGNOSIS — Z78.9 IMPAIRED MOBILITY AND ACTIVITIES OF DAILY LIVING: ICD-10-CM

## 2023-10-10 DIAGNOSIS — I10 ESSENTIAL HYPERTENSION: ICD-10-CM

## 2023-10-10 DIAGNOSIS — K44.9 HIATAL HERNIA: ICD-10-CM

## 2023-10-10 DIAGNOSIS — K59.00 CONSTIPATION, UNSPECIFIED CONSTIPATION TYPE: ICD-10-CM

## 2023-10-10 DIAGNOSIS — I50.30 HEART FAILURE WITH PRESERVED EJECTION FRACTION, NYHA CLASS I (H): ICD-10-CM

## 2023-10-10 PROCEDURE — 99309 SBSQ NF CARE MODERATE MDM 30: CPT | Performed by: NURSE PRACTITIONER

## 2023-10-10 ASSESSMENT — PAIN SCALES - GENERAL: PAINLEVEL: NO PAIN (0)

## 2023-10-10 NOTE — PROGRESS NOTES
[facility] GERIATRICS    Chief Complaint   Patient presents with    RECHECK     HPI:  Makayla Dow is a 83 year old  (1940), who is being seen today for an episodic care visit at: [facility] ([facility]) [[id number]].     Brief Summary of Hospital Course: Treated for MSSA bacteremia/Septic arthritis in C3-4 joint. Complicated by pulmonary edema and acute hypoxic respiratory failure, mild LAURE, acute neck pain. Neurosurgery deferred surgical intervention, however IR was able to aspirate left C3-4 joint. IV antibiotics were narrowed to ancef. ID recommended at least 6 weeks of IV Ancef outpatient.     MEDICATION CHANGES AT HOSPITAL DISMISSAL: Started Bumex, Ancef, losartan, methocarbamol, spironolactone. Stopped ibuprofen.    RECOMMENDED FOLLOW UP: 3 to 4 weeks with infectious disease / neurosurgery for neck pain  PCP for large hiatal hernia - consider follow up with surgery as outpatient (non-urgent); 7mm right lung base nodule - Follow up with PMD/Pulm outpatient in 6-12 months (~April 2024).    Updates on Status Since Skilled nursing Admission:   9/30 - Acetaminophen 1,000 mg by mouth three times daily and 1,000 mg by mouth once daily as needed.   -Senna 1 tab p.o. twice daily as needed    Today, Makayla is feeling quite well. Reports that her neck pain is well controlled with scheduled tylenol. She has not needed to use methocarbamol for spasm. Reports that she has been increasing her range of motion in the neck as well. She endorses no other acute concerns. She denies chest pain, shortness of breath, or orthopnea. Having regular bowel movements daily. No dysuria or urgency. States she really enjoys working with therapy.     Therapy reports patient is standby assist for transfers.  Able to do some stairs.  Ambulating 200 to 250 feet with a single cane.  Independent for toileting and dressing.    Facility EHR reviewed including bm record, progress notes, vital signs and MAR.       Allergies, and PMH/PSH  "reviewed in EPIC today.  REVIEW OF SYSTEMS:  4 point ROS including Respiratory, CV, GI and , other than that noted in the HPI,  is negative    Objective:   /72   Pulse 77   Temp 98  F (36.7  C)   Resp 18   Ht 1.575 m (5' 2\")   Wt 105.1 kg (231 lb 12.8 oz)   SpO2 93%   BMI 42.40 kg/m    GENERAL APPEARANCE:  Alert, in no distress, oriented, cooperative  EYES:  EOM normal, conjunctiva and lids normal  RESP:  respiratory effort and palpation of chest normal, lungs clear to auscultation , no respiratory distress  CV:  Palpation and auscultation of heart done , regular rate and rhythm, no murmur, rub, or gallop, no edema  ABDOMEN:  no guarding or rebound, bowel sounds normal  M/S:   Gait and station normal  PSYCH:  oriented X 3, normal insight, judgement and memory    Recent labs in [facility] reviewed by me today.     Assessment/Plan:  (M46.42) Cervical discitis  (primary encounter diagnosis)  (R78.81) Bacteremia  (R52) Pain  Comment: Stable, improving. Monitoring CBC, BMP, CRP weekly while on ancef.  Plan:   -Continue ancef q8h.   -Follow up with ID scheduled 10/30.   -Continue scheduled tylenol 1000mg TID  -Continue PRN methocarbamol; she has not needed this since TCU admit, consider discontinuing at following visits.    - weekly labs not done yet will need to be reordered. Discussed with nursing.     (K59.00) Constipation, unspecified constipation type  Comment: stable, having 1-2 bowel movements per day.  Plan:   -Continue PRN senna.    (Z74.09,  Z78.9) Impaired mobility and activities of daily living  Comment: Admitted to TCU for ongoing antibiotic treatment and deconditioning. She enjoys going down to therapy every day and is making progress.   Plan:   -Continue PT/Ocupational Therapy  -Nursing following for monitoring  - following for discharge planning    (I10) Essential hypertension  (I50.30) Heart failure with preserved ejection fraction, NYHA class I (H)  Comment: Chronic, " well-compensated. Weight is stable. Recently followed up with HF clinic without new changes to GDMT. BP stable.   Plan:   -Bumex 2mg daily and daily weights  -Losartan 25 mg daily  -Spironolactone 12.5mg daily    (K44.9) Hiatal hernia  Comment: Large hiatus hernia noted on CT scans 9/19 and 9/20. She is currently not having symptoms and would prefer to avoid surgical intervention given her age.  Plan:   -Monitor serially. Consider referral to GI surgery if she develops bothersome symptoms.     (R91.8) Pulmonary nodules  Comment: Stable 7 mm right lung base nodule noted on CT scan 9/2023. No prior CT available to correlate changes. According to Fleischner Society 2017, both low- and high-risk patients should have follow up follow-up CT in 6-12 months.      Plan:  -Consider follow up CT scan in 6-12 months (not ordered at this time)    Orders:  No new orders    Electronically signed by:   Kev Siegel RN, BSN, Student Nurse Practitioner  [facility]    I (the provider) was present with professional student who participated in this service and in the documentation of this service. I have verified the HPI/ROS, history, and personally performed the physical exam and the healthcare decision making. I agree with all elements as documented above.      Electronically signed by:  Elva Cota NP

## 2023-10-10 NOTE — LETTER
10/10/2023        RE: Makayla Dow  [address] [place] [address]        [facility]    Chief Complaint   Patient presents with     RECHECK     HPI:  Makayla Dow is a 83 year old  (1940), who is being seen today for an episodic care visit at: [facility] ([facility]) [[id number]].     Brief Summary of Hospital Course: Treated for MSSA bacteremia/Septic arthritis in C3-4 joint. Complicated by pulmonary edema and acute hypoxic respiratory failure, mild LAURE, acute neck pain. Neurosurgery deferred surgical intervention, however IR was able to aspirate left C3-4 joint. IV antibiotics were narrowed to ancef. ID recommended at least 6 weeks of IV Ancef outpatient.     MEDICATION CHANGES AT HOSPITAL DISMISSAL: Started Bumex, Ancef, losartan, methocarbamol, spironolactone. Stopped ibuprofen.    RECOMMENDED FOLLOW UP: 3 to 4 weeks with infectious disease / neurosurgery for neck pain  PCP for large hiatal hernia - consider follow up with surgery as outpatient (non-urgent); 7mm right lung base nodule - Follow up with PMD/Pulm outpatient in 6-12 months (~April 2024).    Updates on Status Since Skilled nursing Admission:   9/30 - Acetaminophen 1,000 mg by mouth three times daily and 1,000 mg by mouth once daily as needed.   -Senna 1 tab p.o. twice daily as needed    Today, Makayla is feeling quite well. Reports that her neck pain is well controlled with scheduled tylenol. She has not needed to use methocarbamol for spasm. Reports that she has been increasing her range of motion in the neck as well. She endorses no other acute concerns. She denies chest pain, shortness of breath, or orthopnea. Having regular bowel movements daily. No dysuria or urgency. States she really enjoys working with therapy.     Therapy reports patient is standby assist for transfers.  Able to do some stairs.  Ambulating 200 to 250 feet with a single cane.  Independent for toileting and dressing.    Facility  Inpatient consult to Nephrology  Consult performed by: Bertin Estrada MD  Consult ordered by: Esequiel Garcia        History Of Present Illness  John is a 77 year old male presenting with fevers and SOB. He is noted to have necrotic tissue over his L TMA.  Per discussion with ER Housestaff/ Dr Garcia, he is concerned about his requirement of 1 LPM/ NC and CXR findings.  CXR (4/9/22): Suggestion of pulmonary vascular congestion and bilateral pleural effusion. No apparent pneumothorax.  Chest CTA (4/9/22): No filling defect in visualized pulmonary artery to suggest pulmonary embolism. Pulmonary trunk has normal caliber. Small to moderate bilateral pleural effusion with underlying atelectasis or consolidation. Diffuse increased interstitial markings.  L Foot Xray (4/9/22): Stable post transmetatarsal amputation. Relatively clean osteotomy margins. No apparent erosive changes to radiographically suggest osteomyelitis.  L LE US (4/9/22): Nonocclusive DVT at within the left superficial femoral and popliteal  veins.  CTA (4/9/22): Moderate to severe atherosclerotic changes as described above. Intraluminal thrombus in distal thoracic aorta.  Circumferential mural  thrombus just above the origin of celiac trunk. Grossly patent right superficial femoral and popliteal arteries. Nondiagnostic evaluation of right infrapopliteal arterial structures due to extensive calcifications. Occluded left superficial femoral and popliteal arteries. Nondiagnostic evaluation of left infrapopliteal arterial structures due to extensive calcifications.  EMILE (4/9/22): Occluded left superficial femoral artery.  He is referred to vascular Surgery and podiatry for further evaluation and management.  From the renal standpoint, I am going to schedule him for Isolated UF tonight 4/9/22 Saturday.  See below for further discussion and recommendations.    Past Medical History  Past Medical History:   Diagnosis Date   • Anemia    • Chronic kidney  "EHR reviewed including bm record, progress notes, vital signs and MAR.       Allergies, and PMH/PSH reviewed in [facility] today.  REVIEW OF SYSTEMS:  4 point ROS including Respiratory, CV, GI and , other than that noted in the HPI,  is negative    Objective:   /72   Pulse 77   Temp 98  F (36.7  C)   Resp 18   Ht 1.575 m (5' 2\")   Wt 105.1 kg (231 lb 12.8 oz)   SpO2 93%   BMI 42.40 kg/m    GENERAL APPEARANCE:  Alert, in no distress, oriented, cooperative  EYES:  EOM normal, conjunctiva and lids normal  RESP:  respiratory effort and palpation of chest normal, lungs clear to auscultation , no respiratory distress  CV:  Palpation and auscultation of heart done , regular rate and rhythm, no murmur, rub, or gallop, no edema  ABDOMEN:  no guarding or rebound, bowel sounds normal  M/S:   Gait and station normal  PSYCH:  oriented X 3, normal insight, judgement and memory    Recent labs in [facility] reviewed by me today.     Assessment/Plan:  (M46.42) Cervical discitis  (primary encounter diagnosis)  (R78.81) Bacteremia  (R52) Pain  Comment: Stable, improving. Monitoring CBC, BMP, CRP weekly while on ancef.  Plan:   -Continue ancef q8h.   -Follow up with ID scheduled 10/30.   -Continue scheduled tylenol 1000mg TID  -Continue PRN methocarbamol; she has not needed this since TCU admit, consider discontinuing at following visits.    - weekly labs not done yet will need to be reordered. Discussed with nursing.     (K59.00) Constipation, unspecified constipation type  Comment: stable, having 1-2 bowel movements per day.  Plan:   -Continue PRN senna.    (Z74.09,  Z78.9) Impaired mobility and activities of daily living  Comment: Admitted to TCU for ongoing antibiotic treatment and deconditioning. She enjoys going down to therapy every day and is making progress.   Plan:   -Continue PT/Ocupational Therapy  -Nursing following for monitoring  - following for discharge planning    (I10) Essential hypertension  (I50.30) " disease    • Coronary artery disease    • Critical lower limb ischemia (CMS/[facility])    • ESRD (end stage renal disease) (CMS/[facility])    • Essential (primary) hypertension    • Gangrene (CMS/[facility])    • Gout    • Hemodialysis patient (CMS/[facility])    • History of COVID-19     hospitalized in    • Hyperlipidemia    • Hyperlipoproteinemia    • Obesity    • Osteoarthritis    • Other intervertebral disc displacement, lumbar region    • PAD (peripheral artery disease) (CMS/[facility])    • S/P debridement     left TMA   • Sleep apnea    • Ulcer of foot (CMS/[facility])     left TMA   • Unsteadiness on feet    • Unsteady gait    • Wears dentures     upper and lower denture   • Wears glasses    • Wears glasses       Surgical History  Past Surgical History:   Procedure Laterality Date   • Amputation foot,transmetatarsal Left 10/2021    atherectomy, DCB, STent to Left SFA   • Back surgery     • Colonoscopy  2020   • Debridement      left TMA   • Foot surgery     • Foot surgery  10/27/2021    revision of left TMA   • Hip surgery Bilateral    • Knee surgery Left    • Nephrectomy Left    • Peripheral vascular intervention Left 2021    lle revascularization/Left atherectomy with Shockwave and lithotripsy: left SFA   • Peripheral vascular intervention Left 10/26/2021    left peroneal artery revascularization   • Peripheral vascular intervention  10/2021    Left Peroneal  orbital atherectomy and shochwave lithotripsy     Social History  Social History     Tobacco Use   • Smoking status: Former Smoker     Years: 50.00     Quit date:      Years since quittin.2   • Smokeless tobacco: Never Used   Vaping Use   • Vaping Use: never used   Substance Use Topics   • Alcohol use: Never   • Drug use: Never     Family History  Family History   Family history unknown: Yes     Allergies  ALLERGIES:  Patient has no known allergies.    Medications  (Not in a hospital admission)    Review of Systems  Review of Systems   Constitutional: Positive for  Heart failure with preserved ejection fraction, NYHA class I (H)  Comment: Chronic, well-compensated. Weight is stable. Recently followed up with HF clinic without new changes to GDMT. BP stable.   Plan:   -Bumex 2mg daily and daily weights  -Losartan 25 mg daily  -Spironolactone 12.5mg daily    (K44.9) Hiatal hernia  Comment: Large hiatus hernia noted on CT scans 9/19 and 9/20. She is currently not having symptoms and would prefer to avoid surgical intervention given her age.  Plan:   -Monitor serially. Consider referral to GI surgery if she develops bothersome symptoms.     (R91.8) Pulmonary nodules  Comment: Stable 7 mm right lung base nodule noted on CT scan 9/2023. No prior CT available to correlate changes. According to Fleischner Society 2017, both low- and high-risk patients should have follow up follow-up CT in 6-12 months.      Plan:  -Consider follow up CT scan in 6-12 months (not ordered at this time)    Orders:  No new orders    Electronically signed by:   Kev Siegel RN, BSN, Student Nurse Practitioner  [facility]    I (the provider) was present with professional student who participated in this service and in the documentation of this service. I have verified the HPI/ROS, history, and personally performed the physical exam and the healthcare decision making. I agree with all elements as documented above.      Electronically signed by:  Elva Cota NP      Sincerely,        Elva Cota NP       fever.   HENT: Negative for ear discharge and ear pain.    Eyes: Negative for pain and discharge.   Respiratory: Positive for cough and shortness of breath. Negative for hemoptysis, sputum production and wheezing.    Cardiovascular: Negative for chest pain and palpitations.   Gastrointestinal: Negative for abdominal pain, blood in stool, constipation, diarrhea, melena, nausea and vomiting.   Musculoskeletal: Negative for falls.   Neurological: Positive for weakness. Negative for seizures and loss of consciousness.     Physical Exam  Physical Exam  Vitals and nursing note reviewed.   HENT:      Head: Normocephalic.      Neck: Neck supple.   Eyes:      Conjunctiva/sclera: Conjunctivae normal.   Cardiovascular:      Rate and Rhythm: Normal rate and regular rhythm.      Heart sounds: Normal heart sounds.   Pulmonary:      Comments: Decreased BS  Abdominal:      General: Bowel sounds are normal.      Palpations: Abdomen is soft.   Musculoskeletal:         General: Deformity present.      Comments: L TMA with necrotic tissue   Skin:     General: Skin is warm and dry.       Last Recorded Vitals  Blood pressure (!) 159/89, pulse 88, temperature 99.9 °F (37.7 °C), temperature source Oral, resp. rate (!) 22, SpO2 99 %.    Relevant Results  Results for SULLY SNOWDEN (MRN [id number]) as of 4/9/2022 21:05   Ref. Range 4/9/2022 20:30   WBC Latest Ref Range: 4.2 - 11.0 K/mcL 15.9 (H)   RBC Latest Ref Range: 4.50 - 5.90 mil/mcL 2.59 (L)   HGB Latest Ref Range: 13.0 - 17.0 g/dL 7.8 (L)   HCT Latest Ref Range: 39.0 - 51.0 % 26.6 (L)   MCV Latest Ref Range: 78.0 - 100.0 fl 102.7 (H)   MCH Latest Ref Range: 26.0 - 34.0 pg 30.1   MCHC Latest Ref Range: 32.0 - 36.5 g/dL 29.3 (L)   RDW-SD Latest Ref Range: 39.0 - 50.0 fL 62.5 (H)   RDW-CV Latest Ref Range: 11.0 - 15.0 % 16.6 (H)   PLT Latest Ref Range: 140 - 450 K/mcL 232   NRBC Latest Ref Range: <=0 /100 WBC 0   Neutrophil Latest Units: % 80   LYMPH Latest Units: % 8   MONO Latest Units:  % 10   EOSIN Latest Units: % 1   BASO Latest Units: % 0   Absolute Neutrophil Latest Ref Range: 1.8 - 7.7 K/mcL 12.8 (H)   Absolute Lymph Latest Ref Range: 1.0 - 4.0 K/mcL 1.3   Absolute Mono Latest Ref Range: 0.3 - 0.9 K/mcL 1.5 (H)   Absolute Eos Latest Ref Range: 0.0 - 0.5 K/mcL 0.1   Absolute Baso Latest Ref Range: 0.0 - 0.3 K/mcL 0.1   Immature Granulocytes Latest Units: % 1   Absolute Immature Granulocytes Latest Ref Range: 0.0 - 0.2 K/mcL 0.2     Assessment & Plan   Patient Active Problem List   Diagnosis   • Pneumonia   • ESRD (end stage renal disease) on dialysis (CMS/[facility])   • MGUS (monoclonal gammopathy of unknown significance)   • Anemia due to chronic kidney disease, on chronic dialysis (CMS/[facility])   • Thrombocythemia   • Gout   • Hyperparathyroidism, secondary renal (CMS/[facility])   • HLD (hyperlipidemia)   • Hypoalbuminemia due to protein-calorie malnutrition (CMS/[facility])   • Clotted renal dialysis arteriovenous graft (CMS/[facility])   • COVID-19 virus not detected   • Critical lower limb ischemia (CMS/[facility])   • PAD (peripheral artery disease) (CMS/[facility])   • Gangrene of left foot (CMS/[facility])   • Critical lower limb ischemia (CMS/[facility])   • Obesity (BMI 30-39.9)   • ODALIS (obstructive sleep apnea)   • Long term (current) use of anticoagulants   • Essential (primary) hypertension   • Coronary artery disease     1. CRF/ ESRD (on maintenance hemodialysis: MWF at [facility] [place] [facility] under Dr Satnam Ochoa)   A. HTN  B. 2D ECHO (3/21/20): LVEF 50-55% with Grade1 Diastolic Dysfunction    Isolated UF tonight 4/9/22 Saturday as ordered   Next hemodialysis on 4/11/22 Monday  2. Anemia secondary to Chronic illness/ CKD  Hgb 7.8 (4/9/22)  - Check Fe studies  - Start (4/11/22) EPO 4000 q MWF  3. 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 SPEP (4/8/20); IgG Kappa monoclonal protein  5. Chest CTA (3/20/20): There is an apparent intraluminal defect within a posterior basal segmental branch of the right lower lobe pulmonary arteries consistent presence of an  embolus. The main, bilateral central, bilateral upper lobe, right middle lobe and left lower lobe pulmonary arteries appear of normal caliber, without demonstrable defect or other evidence of additional pulmonary thromboembolism. The bilateral lourdes appear otherwise  intrinsically intact, without evidence of mass, lymphadenopathy or attenuation of the central tracheobronchial tree. There are infiltrative changes involving the anterior and apicoposterior segments of the left upper lobe, the posterior segment of the right upper lobe, the lateral segment of the right middle lobe and the basal segments of the bilateral (right side considerably more pronounced than left) lower lobes with associated tiny  bilateral pleural effusions. There is no evidence of non-calcified pulmonary parenchymal nodule, bronchiectasis or diffuse interstitial fibrosis.  Chest CTA (4/9/22): No filling defect in visualized pulmonary artery to suggest pulmonary embolism. Pulmonary trunk has normal caliber. Small to moderate bilateral pleural effusion with underlying atelectasis or consolidation. Diffuse increased interstitial markings.  6. COVID-19 positive (3/23/20)  COVID-19 negative (4/21/20)  COVID-19 negative (9/2/21)  COVID-19 negative (9/13/21)  COVID-19 negative (10/26/21)  COVID-19 negative (10/29/21)  COVID-19 negative (2/22/22)  COVID-19 negative (2/25/22)  CXR (4/9/22): Suggestion of pulmonary vascular congestion and bilateral pleural effusion. No apparent pneumothorax.  7. Hypoalbuminemia  8. Cholelithiasis (CT, 3/20/20)  9. Goiter  CT (3/20/20): There is slight to moderate diffuse enlargement of the thyroid gland, which extends into the upper anterior and middle mediastinum and is consistent with the appearance of a mediastinal goiter, which is associated with slight to moderate attenuation in the caliber of the adjacent trachea.  10. Dyslipidemia  11. HBsAb positive (4/1/20)  12. B LE US (3/23/20): Normal bilateral lower extremity  venous duplex.  No deep vein thrombosis  13. L UE AVG  Duplex US (4/21/20): The left brachial artery proximal to the anastomosis is patent with normal waveform with peak systolic velocity of 183 cm/s.  The left brachial artery distal to the arterial anastomosis measures 64 cm/s with triphasic waveform.  The arteriovenous graft is completely occluded throughout.  There is extension of clot into the left axillary vein, just distal to the anastomosis.    S/P Open thrombectomy of L UE AVG; Graftogram with central venogram; Stent angioplasty of the axillary anastomosis using a 9 mm self-expanding Viabahn stent (4/22/20, Dr Gore)    - Tunneled R IJ HD Permcath  14. L Foot MRI (9/2/21): Reactive osteitis versus early osteomyelitis in the 2nd middle phalanx, 3rd proximal through terminal phalanges, and 4th proximal through terminal phalanges. The signal changes are borderline for osteomyelitis and accurate differentiation is limited. Reactive osteitis in the remainder of the phalanges. Paucity of soft tissues overlies the 2nd through 4th toes, possibly due to ulceration or soft tissue necrosis. Cellulitis. No discrete abscess.   Angiogram (9/2/21): Severe stenosis of the left superficial femoral artery with heavy calcification with successful percutaneous revascularization. Chronic total occlusion of all infrapopliteal vessels.  - S/P Prox L LELAND to Dist L LELAND lesion with 20% stenosis; Dist R LELAND to Mid R EIA lesion with 25% stenosis; Mid L SFA to Prox L Popliteal lesion with 99% stenosis; Prox L TP Trunk to Mid L Peroneal lesion with 100% stenosis; Ost L LIANNE to Dist L LIANNE lesion with 100% stenosis (9/2/21)  Duplex US (9/4/21):   Right: Nonvisualization of the anterior tibial artery, mid and distal posterior tibial artery, and the peroneal artery concerning for occlusion. Patent dorsalis pedis artery likely supplied by collateral with parvus tardus waveform consistent with upstream stenosis. Patent superficial femoral  artery with calcified plaque in the mid SFA and distal parvus tardus waveform compatible with stenosis  Left: Nonvisualization of the anterior tibial, posterior tibial, and peroneal arteries concerning for occlusion; Patent superficial femoral and popliteal arteries  L Foot Xray (9/8/21): Status post amputation of the toes at the distal metatarsal level. Tarsometatarsal articulations are intact. No immediate complication. Bones are osteopenic. No bone destruction to suggest osteomyelitis. No soft tissue concerning abnormality. Vascular calcification.  Angiogram (10/26/21): Prox L LELAND to Dist L LELAND lesion with 20% stenosis. Ost L LIANNE to Dist L LIANNE lesion with 100% stenosis. Prox L TP Trunk to Mid L Peroneal lesion with 100% stenosis. Dist R LELAND to Mid R EIA lesion with 25% stenosis. Prox L SFA to Prox L Popliteal lesion with 30% stenosis. Ost L PTA to Dist L PTA lesion with 100% stenosis.  - S/P Percutaneous revascularization with antegrade retrograde using peroneal retrograde access with orbital atherectomy and shockwave intravascular lithotripsy (10/26/21)  L Foot Xray (10/27/21): Osteopenia. Previous transmetatarsal amputation of all 5 toes without complication  L Foot Xray (10/28/21): Status post transmetatarsal revision amputation. No immediate complication  Angiogram (2/24/22): Prox L LELAND to Dist L LELAND lesion with 20% stenosis. Prox L SFA to Prox L Popliteal lesion with 100% stenosis. Ost L LIANNE to Dist L LIANNE lesion with 100% stenosis. Prox L TP Trunk to Mid L Peroneal lesion with 100% stenosis. Ost L PTA to Dist L PTA lesion with 100% stenosis. Dist R LELAND to Mid R EIA lesion with 25% stenosis.  - S/P Aspiration thrombectomy with penumbra CAT7. Suction rotational atherectomy to the left femoral artery, left popliteal artery, left peroneal artery. Scoring balloon angioplasty to the left popliteal artery and left SFA artery. Spider filter insertion and removal. TPA 2 mg injection to the peroneal artery.  Drug-eluting stents deployment x2 to the SFA artery (2/24/22)  L Foot Xray (2/25/22): Stable appearance of the transmetatarsal amputation of the left foot. No  radiographic evidence of osteomyelitis.  - Admitted with fevers, necrotic tissue over L TMA  L Foot Xray (4/9/22): Stable post transmetatarsal amputation. Relatively clean osteotomy margins. No apparent erosive changes to radiographically suggest osteomyelitis.  L LE US (4/9/22): Nonocclusive DVT at within the left superficial femoral and popliteal  veins.  CTA (4/9/22): Moderate to severe atherosclerotic changes as described above. Intraluminal thrombus in distal thoracic aorta. Circumferential mural thrombus just above the origin of celiac trunk. Grossly patent right superficial femoral and popliteal arteries. Nondiagnostic evaluation of right infrapopliteal arterial structures due to extensive calcifications. Occluded left superficial femoral and popliteal arteries. Nondiagnostic evaluation of left infrapopliteal arterial structures due to extensive calcifications.  EMILE (4/9/22): Occluded left superficial femoral artery.  A. Leukocytosis  WBC 15.9 (4/29/22)  F/U Pancultures  IV Abx  ID Consult/ F/U    Code Status    Code Status: Prior

## 2023-10-11 ENCOUNTER — LAB REQUISITION (OUTPATIENT)
Dept: LAB | Facility: CLINIC | Age: 83
End: 2023-10-11
Payer: COMMERCIAL

## 2023-10-11 DIAGNOSIS — R78.81 BACTEREMIA: ICD-10-CM

## 2023-10-11 LAB — BACTERIA SNV CULT: NORMAL

## 2023-10-11 NOTE — TELEPHONE ENCOUNTER
[facility] Call Center    Phone Message    May a detailed message be left on voicemail: yes     Reason for Call: Other: Call back to schedule with Dr. Carmencita Juarez, RN at [facility], calling back to schedule the Follow up with Dr. Tse.     Next available with Dr. Tse is not until 11/7/23; notes below stated Pt needs to follow up with 2-3 week, 11/7/23 is past this time frame.    Please contact Ann back if Dr. Tse is able to get the Pt in sooner for a follow up appointment, Ph: [phone], ok to leave a msg if it goes to a .

## 2023-10-12 LAB
ANION GAP SERPL CALCULATED.3IONS-SCNC: 9 MMOL/L (ref 7–15)
BUN SERPL-MCNC: 23.6 MG/DL (ref 8–23)
CALCIUM SERPL-MCNC: 9.1 MG/DL (ref 8.8–10.2)
CHLORIDE SERPL-SCNC: 104 MMOL/L (ref 98–107)
CREAT SERPL-MCNC: 0.82 MG/DL (ref 0.51–0.95)
CRP SERPL-MCNC: 3.96 MG/L
DEPRECATED HCO3 PLAS-SCNC: 29 MMOL/L (ref 22–29)
EGFRCR SERPLBLD CKD-EPI 2021: 70 ML/MIN/1.73M2
ERYTHROCYTE [DISTWIDTH] IN BLOOD BY AUTOMATED COUNT: 13.8 % (ref 10–15)
GLUCOSE SERPL-MCNC: 90 MG/DL (ref 70–99)
HCT VFR BLD AUTO: 34.6 % (ref 35–47)
HGB BLD-MCNC: 10.5 G/DL (ref 11.7–15.7)
MCH RBC QN AUTO: 30.3 PG (ref 26.5–33)
MCHC RBC AUTO-ENTMCNC: 30.3 G/DL (ref 31.5–36.5)
MCV RBC AUTO: 100 FL (ref 78–100)
PLATELET # BLD AUTO: 256 10E3/UL (ref 150–450)
POTASSIUM SERPL-SCNC: 4.2 MMOL/L (ref 3.4–5.3)
RBC # BLD AUTO: 3.47 10E6/UL (ref 3.8–5.2)
SODIUM SERPL-SCNC: 142 MMOL/L (ref 135–145)
WBC # BLD AUTO: 4.6 10E3/UL (ref 4–11)

## 2023-10-12 PROCEDURE — 85027 COMPLETE CBC AUTOMATED: CPT | Performed by: FAMILY MEDICINE

## 2023-10-12 PROCEDURE — 86140 C-REACTIVE PROTEIN: CPT | Performed by: FAMILY MEDICINE

## 2023-10-12 PROCEDURE — 36415 COLL VENOUS BLD VENIPUNCTURE: CPT | Performed by: FAMILY MEDICINE

## 2023-10-12 PROCEDURE — P9604 ONE-WAY ALLOW PRORATED TRIP: HCPCS | Performed by: FAMILY MEDICINE

## 2023-10-12 PROCEDURE — 80048 BASIC METABOLIC PNL TOTAL CA: CPT | Performed by: FAMILY MEDICINE

## 2023-10-17 ENCOUNTER — TELEPHONE (OUTPATIENT)
Dept: INFECTIOUS DISEASES | Facility: CLINIC | Age: 83
End: 2023-10-17

## 2023-10-17 ENCOUNTER — OFFICE VISIT (OUTPATIENT)
Dept: INFECTIOUS DISEASES | Facility: CLINIC | Age: 83
End: 2023-10-17
Attending: STUDENT IN AN ORGANIZED HEALTH CARE EDUCATION/TRAINING PROGRAM
Payer: COMMERCIAL

## 2023-10-17 VITALS
TEMPERATURE: 98.3 F | BODY MASS INDEX: 42.05 KG/M2 | SYSTOLIC BLOOD PRESSURE: 132 MMHG | HEART RATE: 90 BPM | WEIGHT: 229.9 LBS | DIASTOLIC BLOOD PRESSURE: 78 MMHG | OXYGEN SATURATION: 95 %

## 2023-10-17 DIAGNOSIS — R78.81 MSSA BACTEREMIA: ICD-10-CM

## 2023-10-17 DIAGNOSIS — B95.61 MSSA BACTEREMIA: ICD-10-CM

## 2023-10-17 PROCEDURE — 99213 OFFICE O/P EST LOW 20 MIN: CPT | Performed by: STUDENT IN AN ORGANIZED HEALTH CARE EDUCATION/TRAINING PROGRAM

## 2023-10-17 NOTE — PROGRESS NOTES
[facility] post-hospital stay follow up.    Name: Makayla Dow  :   1940  MRN:   [id number]  PCP:    Jerzy Spencer  DOS:    10/17/23      CC: Post Hospital Stay follow up for MSSA bacteremia with C3-C4 cervical discitis.    HPI/Interval History:  Makayla Dow is a 83 year old old female with the history of congestive heart failure, bilateral TKA who was admitted to the hospital with sepsis and found to have MSSA bacteremia.  Blood cultures positive on 2023.  Subsequent blood culture from 2023 no growth.  She was also having some knee pain but MRI did not show any evidence of septic arthritis of prosthetic joint.  She subsequently started to complain of neck pain and MRI showed C3-C4 cervical discitis.  The patient was discharged on IV Ancef.  In clinic today, she is feeling much better.  Her neck pain has totally resolved.  She is tolerating IV Ancef well.  She is very pleased with how well she is doing.  She is currently at a TCU.    ===========================  Past Medical History:  Past Medical History:   Diagnosis Date    Chronic heart failure with preserved ejection fraction (H) 10/05/2023    Congestive heart failure (H)     Hyperlipidemia     Hypertension     Obese     Primary hypertension 10/05/2023       Past Surgical History:  Past Surgical History:   Procedure Laterality Date    IR BONE BIOPSY VERTEBRAL  2023    PICC SINGLE LUMEN PLACEMENT  2023       Social History:  Social History     Socioeconomic History    Marital status:      Spouse name: Not on file    Number of children: Not on file    Years of education: Not on file    Highest education level: Not on file   Occupational History    Not on file   Tobacco Use    Smoking status: Never    Smokeless tobacco: Not on file   Substance and Sexual Activity    Alcohol use: Not Currently    Drug use: Not on file    Sexual activity: Not on file   Other Topics Concern    Not on file    Social History Narrative    Not on file     Social Determinants of Health     Financial Resource Strain: Not on file   Food Insecurity: Not on file   Transportation Needs: Not on file   Physical Activity: Not on file   Stress: Not on file   Social Connections: Not on file   Interpersonal Safety: Not on file   Housing Stability: Not on file       Family Medical History:  No family history recurrent infections.    Allergies:     Allergies   Allergen Reactions    Hydroxychloroquine Itching    Ampicillin Rash    Naproxen Itching       Medications:  Current Outpatient Medications   Medication Sig Dispense Refill    aspirin (ASPIRIN LOW DOSE) 81 MG EC tablet [ASPIRIN (ASPIRIN LOW DOSE) 81 MG EC TABLET] Take 81 mg by mouth daily.      bumetanide (BUMEX) 2 MG tablet Take 1 tablet (2 mg) by mouth daily 30 tablet 1    ceFAZolin (ANCEF) intermittent infusion 2 g in 100 mL dextrose PRE-MIX Inject 100 mLs (2 g) into the vein every 8 hours for 42 days 27364 mL 0    levothyroxine (SYNTHROID, LEVOTHROID) 50 MCG tablet [LEVOTHYROXINE (SYNTHROID, LEVOTHROID) 50 MCG TABLET] Take 50 mcg by mouth daily.      losartan (COZAAR) 25 MG tablet Take 1 tablet (25 mg) by mouth daily 30 tablet 1    lovastatin (MEVACOR) 20 MG tablet [LOVASTATIN (MEVACOR) 20 MG TABLET] Take 20 mg by mouth at bedtime.      methocarbamol (ROBAXIN) 500 MG tablet Take 1 tablet (500 mg) by mouth 3 times daily as needed for muscle spasms 30 tablet 1    multivitamin therapeutic tablet [MULTIVITAMIN THERAPEUTIC TABLET] Take 1 tablet by mouth daily.      omega 3-dha-epa-fish oil (FISH OIL) 1,000 mg (120 mg-180 mg) cap [OMEGA 3-DHA-EPA-FISH OIL (FISH OIL) 1,000 MG (120 MG-180 MG) CAP] Take by mouth.      oxyBUTYnin ER (DITROPAN XL) 10 MG 24 hr tablet Take 10 mg by mouth daily      spironolactone (ALDACTONE) 25 MG tablet Take 0.5 tablets (12.5 mg) by mouth daily 15 tablet 1       Immunizations:  Immunization History   Administered Date(s) Administered    COVID-19  Monovalent 18+ (Moderna) 03/18/2021, 04/15/2021, 01/20/2022    Influenza Vaccine 65+ (FLUAD) 09/23/2021, 02/10/2023    Pneumococcal 23 valent 1940    Td,adult,historic,unspecified 1940       ==================    Review of System:  12 points review of system is negative except for findings in the HPI.    Exam  VS: /78 (BP Location: Right arm, Patient Position: Sitting)   Pulse 90   Temp 98.3  F (36.8  C)   Wt 104.3 kg (229 lb 14.4 oz)   SpO2 95%   BMI 42.05 kg/m      Gen: Pleasant in no acute distress.  HEENT: NCAT. EOMI.  Neck: No LAD.  Full range of motion.  Lungs: Clear to auscultation bilaterally with no crackles or wheezes.   Card: RRR. No RMG. Peripheral pulses present and symmetrical. No edema.   Abd: Soft NT ND. No hepatomegaly or splenomegaly.  Ext: No evidence of septic arthritis.  Lymph: No cervical or supraclavicular adenopathy.  Skin: No rash  Neuro: Alert and oriented to place time and person. Cranial nerves grossly intact.     Labs:  CRP inflammation  Order: [id number]  Status: Final result       Visible to patient: No (inaccessible in MyChart)       Dx: Bacteremia    0 Result Notes             Component  Ref Range & Units 5 d ago  (10/12/23) 2 wk ago  (10/3/23) 2 wk ago  (9/29/23) 3 wk ago  (9/25/23) 4 wk ago  (9/19/23)    CRP Inflammation  <5.00 mg/L 3.96 18.30 High  94.70 High  118.20 High  35.70 High    Resulting Agency [facility]              Specimen Collected: 10/12/23  5:52 AM Last Resulted: 10/12/23 11:02 AM             Imaging:  Reviewed.    Assessment:  Makayla Dow is a 83 year old old female with MSSA bacteremia complicated with cervical discitis at C3-C4.  On IV Ancef.  CRP normalized on 10/12/2023 from a peak of 118.  6 weeks of IV antibiotic will end on 11/1/2023.      Recommendations:  Continue IV Ancef until 11/1/2023.  Same weekly labs.  Remove PICC line after last dose of IV antibiotic.  Okay from the ID standpoint to discharge  the patient home with IV antibiotic can be arranged for outpatient.  Patient to monitor knee pain and swelling.  If this were to happen, patient should have the knee aspirated looking for evidence of infection.      Cash Tse MD  [facility]  [facility]  Office Telephone [phone].  Fax [phone]  [facility] paging

## 2023-10-18 ENCOUNTER — LAB REQUISITION (OUTPATIENT)
Dept: LAB | Facility: CLINIC | Age: 83
End: 2023-10-18
Payer: COMMERCIAL

## 2023-10-18 DIAGNOSIS — R78.81 BACTEREMIA: ICD-10-CM

## 2023-10-18 NOTE — TELEPHONE ENCOUNTER
Spoke to nurse ([email or web address]) from [facility] and informed of below. Verbalized understanding took VO    Spoke to Pharmacy- [facility]  [phone]-   Informed of end date as well.

## 2023-10-19 LAB
ANION GAP SERPL CALCULATED.3IONS-SCNC: 12 MMOL/L (ref 7–15)
BUN SERPL-MCNC: 29.8 MG/DL (ref 8–23)
CALCIUM SERPL-MCNC: 9.5 MG/DL (ref 8.8–10.2)
CHLORIDE SERPL-SCNC: 101 MMOL/L (ref 98–107)
CREAT SERPL-MCNC: 0.89 MG/DL (ref 0.51–0.95)
CRP SERPL-MCNC: 3.25 MG/L
DEPRECATED HCO3 PLAS-SCNC: 27 MMOL/L (ref 22–29)
EGFRCR SERPLBLD CKD-EPI 2021: 63 ML/MIN/1.73M2
ERYTHROCYTE [DISTWIDTH] IN BLOOD BY AUTOMATED COUNT: 13.7 % (ref 10–15)
GLUCOSE SERPL-MCNC: 124 MG/DL (ref 70–99)
HCT VFR BLD AUTO: 38.8 % (ref 35–47)
HGB BLD-MCNC: 12 G/DL (ref 11.7–15.7)
MCH RBC QN AUTO: 30.2 PG (ref 26.5–33)
MCHC RBC AUTO-ENTMCNC: 30.9 G/DL (ref 31.5–36.5)
MCV RBC AUTO: 98 FL (ref 78–100)
PLATELET # BLD AUTO: 197 10E3/UL (ref 150–450)
POTASSIUM SERPL-SCNC: 4.5 MMOL/L (ref 3.4–5.3)
RBC # BLD AUTO: 3.97 10E6/UL (ref 3.8–5.2)
SODIUM SERPL-SCNC: 140 MMOL/L (ref 135–145)
WBC # BLD AUTO: 5 10E3/UL (ref 4–11)

## 2023-10-19 PROCEDURE — 36415 COLL VENOUS BLD VENIPUNCTURE: CPT | Performed by: FAMILY MEDICINE

## 2023-10-19 PROCEDURE — P9604 ONE-WAY ALLOW PRORATED TRIP: HCPCS | Performed by: FAMILY MEDICINE

## 2023-10-19 PROCEDURE — 80048 BASIC METABOLIC PNL TOTAL CA: CPT | Performed by: FAMILY MEDICINE

## 2023-10-19 PROCEDURE — 86140 C-REACTIVE PROTEIN: CPT | Performed by: FAMILY MEDICINE

## 2023-10-19 PROCEDURE — 85027 COMPLETE CBC AUTOMATED: CPT | Performed by: FAMILY MEDICINE

## 2023-10-24 ENCOUNTER — TRANSITIONAL CARE UNIT VISIT (OUTPATIENT)
Dept: GERIATRICS | Facility: CLINIC | Age: 83
End: 2023-10-24
Payer: COMMERCIAL

## 2023-10-24 VITALS
OXYGEN SATURATION: 94 % | TEMPERATURE: 97.6 F | RESPIRATION RATE: 18 BRPM | HEART RATE: 87 BPM | HEIGHT: 62 IN | WEIGHT: 230 LBS | DIASTOLIC BLOOD PRESSURE: 70 MMHG | BODY MASS INDEX: 42.33 KG/M2 | SYSTOLIC BLOOD PRESSURE: 154 MMHG

## 2023-10-24 DIAGNOSIS — M46.50 SEPTIC ARTHRITIS OF VERTEBRA, DUE TO UNSPECIFIED ORGANISM (H): ICD-10-CM

## 2023-10-24 DIAGNOSIS — I50.32 CHRONIC HEART FAILURE WITH PRESERVED EJECTION FRACTION (H): ICD-10-CM

## 2023-10-24 DIAGNOSIS — M62.81 GENERALIZED MUSCLE WEAKNESS: ICD-10-CM

## 2023-10-24 DIAGNOSIS — M79.10 MUSCLE TENSION PAIN: Primary | ICD-10-CM

## 2023-10-24 DIAGNOSIS — I10 PRIMARY HYPERTENSION: ICD-10-CM

## 2023-10-24 DIAGNOSIS — M06.9 RHEUMATOID ARTHRITIS, INVOLVING UNSPECIFIED SITE, UNSPECIFIED WHETHER RHEUMATOID FACTOR PRESENT (H): ICD-10-CM

## 2023-10-24 PROCEDURE — 99309 SBSQ NF CARE MODERATE MDM 30: CPT | Performed by: NURSE PRACTITIONER

## 2023-10-24 RX ORDER — ACETAMINOPHEN 500 MG
1000 TABLET ORAL 3 TIMES DAILY
COMMUNITY

## 2023-10-24 RX ORDER — SENNOSIDES 8.6 MG
1 TABLET ORAL DAILY
COMMUNITY

## 2023-10-24 NOTE — PROGRESS NOTES
"                                                                                   [facility] GERIATR[facility]      Code Status:  FULL CODE  Visit Type: RECHECK     Facility:   [facility] ([facility]) [[id number]]         History of Present Illness: Makayla Dow is a 83 year old female with a past medical history for HFpEF, HTN, hypothyroidism, ELIZABETH (noncompliant with CPAP).  She was hospitalized in September for acute HfpEF, pulmonary edema, mild AKA and sepsis 2/2 to MSSA bacteremia and septic arthritis. She was seen by ID and recommended IV ancef through 10/31.  NSG recommended no surgical intervention.      Of note, CT showed dilated gallbladder with mild pericholecystic fat stranding.  She was asymptomatic and recommend to follow up as an outpatient if becomes symptomatic.     Today, I follow up with patient and she endorse neck \"aching\".  She is noted to have shoulder and neck muscle tension.  She is not using the Robaxin as she does not feel it it is that bad.  Weights stable at 230.  She will be done with IV ancef next Tuesday and then plans to discharge on 11/1.     Current Outpatient Medications   Medication Sig Dispense Refill    acetaminophen (TYLENOL) 500 MG tablet Take 1,000 mg by mouth 3 times daily      aspirin (ASPIRIN LOW DOSE) 81 MG EC tablet [ASPIRIN (ASPIRIN LOW DOSE) 81 MG EC TABLET] Take 81 mg by mouth daily.      bumetanide (BUMEX) 2 MG tablet Take 1 tablet (2 mg) by mouth daily 30 tablet 1    ceFAZolin (ANCEF) intermittent infusion 2 g in 100 mL dextrose PRE-MIX Inject 100 mLs (2 g) into the vein every 8 hours for 42 days 15653 mL 0    levothyroxine (SYNTHROID, LEVOTHROID) 50 MCG tablet [LEVOTHYROXINE (SYNTHROID, LEVOTHROID) 50 MCG TABLET] Take 50 mcg by mouth daily.      losartan (COZAAR) 25 MG tablet Take 1 tablet (25 mg) by mouth daily 30 tablet 1    lovastatin (MEVACOR) 20 MG tablet [LOVASTATIN (MEVACOR) 20 MG TABLET] Take 20 mg by mouth at bedtime.      methocarbamol (ROBAXIN) 500 MG " "tablet Take 1 tablet (500 mg) by mouth 3 times daily as needed for muscle spasms 30 tablet 1    multivitamin therapeutic tablet [MULTIVITAMIN THERAPEUTIC TABLET] Take 1 tablet by mouth daily.      oxyBUTYnin ER (DITROPAN XL) 10 MG 24 hr tablet Take 10 mg by mouth daily      sennosides (SENOKOT) 8.6 MG tablet Take 1 tablet by mouth daily      spironolactone (ALDACTONE) 25 MG tablet Take 0.5 tablets (12.5 mg) by mouth daily 15 tablet 1    omega 3-dha-epa-fish oil (FISH OIL) 1,000 mg (120 mg-180 mg) cap [OMEGA 3-DHA-EPA-FISH OIL (FISH OIL) 1,000 MG (120 MG-180 MG) CAP] Take by mouth. (Patient not taking: Reported on 10/24/2023)         Review of Systems   Patient denies fever, chills, headache, lightheadedness, dizziness, rhinorrhea, cough, congestion, shortness of breath, chest pain, palpitations, abdominal pain, n/v, diarrhea, constipation, change in appetite, change in sleep pattern, dysuria, frequency, burning or pain with urination.  Other than stated in HPI all other review of systems is negative.       Physical Exam  Vital signs:BP (!) 154/70   Pulse 87   Temp 97.6  F (36.4  C)   Resp 18   Ht 1.575 m (5' 2\")   Wt 104.3 kg (230 lb)   SpO2 94%   BMI 42.07 kg/m     GENERAL APPEARANCE: Well developed, well nourished, in no acute distress.  HEENT: normocephalic, atraumatic   sclerae anicteric, conjunctivae clear and moist, EOM intact  LUNGS: Lung sounds CTA, no adventitious sounds, respiratory effort normal.  CARD: RRR, S1, S2, without murmurs, gallops, rubs  BACK: neck and shoulder muscle tension with trigger points  ABD: Soft, nondistended and nontender with normal bowel sounds.   MSK: Muscle strength and tone were equal bilaterally. Moves all extremities easily and intentionally.  LUE PICC CDI  EXTREMITIES: No cyanosis, clubbing or edema.  NEURO: Alert and oriented x 3.  Face is symmetric.  SKIN: Inspection of the skin reveals no rashes, ulcerations or petechiae.  PSYCH: euthymic        Labs:    Last " Comprehensive Metabolic Panel:  Lab Results   Component Value Date     10/19/2023    POTASSIUM 4.5 10/19/2023    CHLORIDE 101 10/19/2023    CO2 27 10/19/2023    ANIONGAP 12 10/19/2023     (H) 10/19/2023    BUN 29.8 (H) 10/19/2023    CR 0.89 10/19/2023    GFRESTIMATED 63 10/19/2023    ALVARO 9.5 10/19/2023       Lab Results   Component Value Date    WBC 5.0 10/19/2023     Lab Results   Component Value Date    RBC 3.97 10/19/2023     Lab Results   Component Value Date    HGB 12.0 10/19/2023     Lab Results   Component Value Date    HCT 38.8 10/19/2023     Lab Results   Component Value Date    MCV 98 10/19/2023     Lab Results   Component Value Date    MCH 30.2 10/19/2023     Lab Results   Component Value Date    MCHC 30.9 10/19/2023     Lab Results   Component Value Date    RDW 13.7 10/19/2023     Lab Results   Component Value Date     10/19/2023           Assessment/Plan:  Muscle tension pain  Discontinue robaxin, add icy hot TID with massage.      Septic arthritis of vertebra, due to unspecified organism (H24)  Continue with IV ancef through 10/31.  Pull PICC on 10/31 and discharge to home on 11/1.     Generalized muscle weakness  PT/OT, endurance improving    Chronic heart failure with preserved ejection fraction (H)  Weights stable, compensated, continue with losartan, spironolactone and bumex.  Monitor labs as needed.     Primary hypertension  Controlled for age, continue with losartan.      Rheumatoid arthritis, involving unspecified site, unspecified whether rheumatoid factor present (H)  No complaints of pain.  Continue on Apap TID.  Has been on methotrexate in the past.  Follow up with rheumatology regarding restarting.      DISCHARGE PLAN/FACE TO FACE:  I certify that this patient is under my care and that I, or a nurse practitioner or physician's assistant working with me, had a face-to-face encounter that meets the physician face-to-face encounter requirements with this patient.   Date of  Face-to-Face Encounter: 10/24/2023    I certify that, based on my findings, the following services are medically necessary home health services: RN, PT/OT    My clinical findings support the need for the above skilled services because: RN for medication management and teaching, PT/OT for ongoing endurance and strengthening    This patient is homebound because: She requires maximum effort in order to get out into the community on a regular basis.     The patient is, or has been, under my care and I have initiated the establishment of the plan of care. This patient will be followed by a physician who will periodically review the plan of care.          Electronically signed by:Anna Mckeon NP

## 2023-10-24 NOTE — LETTER
"    10/24/2023        RE: Makayla Dow  [address] [place] [address]                                                                                           [facility] GERIATRICS      Code Status:  FULL CODE  Visit Type: RECHECK     Facility:   [facility]) [[id number]]         History of Present Illness: Makayla Dow is a 83 year old female with a past medical history for HFpEF, HTN, hypothyroidism, ELIZABETH (noncompliant with CPAP).  She was hospitalized in September for acute HfpEF, pulmonary edema, mild AKA and sepsis 2/2 to MSSA bacteremia and septic arthritis. She was seen by ID and recommended IV ancef through 10/31.  NSG recommended no surgical intervention.      Of note, CT showed dilated gallbladder with mild pericholecystic fat stranding.  She was asymptomatic and recommend to follow up as an outpatient if becomes symptomatic.     Today, I follow up with patient and she endorse neck \"aching\".  She is noted to have shoulder and neck muscle tension.  She is not using the Robaxin as she does not feel it it is that bad.  Weights stable at 230.  She will be done with IV ancef next Tuesday and then plans to discharge on 11/1.     Current Outpatient Medications   Medication Sig Dispense Refill     acetaminophen (TYLENOL) 500 MG tablet Take 1,000 mg by mouth 3 times daily       aspirin (ASPIRIN LOW DOSE) 81 MG EC tablet [ASPIRIN (ASPIRIN LOW DOSE) 81 MG EC TABLET] Take 81 mg by mouth daily.       bumetanide (BUMEX) 2 MG tablet Take 1 tablet (2 mg) by mouth daily 30 tablet 1     ceFAZolin (ANCEF) intermittent infusion 2 g in 100 mL dextrose PRE-MIX Inject 100 mLs (2 g) into the vein every 8 hours for 42 days 95549 mL 0     levothyroxine (SYNTHROID, LEVOTHROID) 50 MCG tablet [LEVOTHYROXINE (SYNTHROID, LEVOTHROID) 50 MCG TABLET] Take 50 mcg by mouth daily.       losartan (COZAAR) 25 MG tablet Take 1 tablet (25 mg) by mouth daily 30 tablet 1     lovastatin (MEVACOR) 20 MG tablet " "[LOVASTATIN (MEVACOR) 20 MG TABLET] Take 20 mg by mouth at bedtime.       methocarbamol (ROBAXIN) 500 MG tablet Take 1 tablet (500 mg) by mouth 3 times daily as needed for muscle spasms 30 tablet 1     multivitamin therapeutic tablet [MULTIVITAMIN THERAPEUTIC TABLET] Take 1 tablet by mouth daily.       oxyBUTYnin ER (DITROPAN XL) 10 MG 24 hr tablet Take 10 mg by mouth daily       sennosides (SENOKOT) 8.6 MG tablet Take 1 tablet by mouth daily       spironolactone (ALDACTONE) 25 MG tablet Take 0.5 tablets (12.5 mg) by mouth daily 15 tablet 1     omega 3-dha-epa-fish oil (FISH OIL) 1,000 mg (120 mg-180 mg) cap [OMEGA 3-DHA-EPA-FISH OIL (FISH OIL) 1,000 MG (120 MG-180 MG) CAP] Take by mouth. (Patient not taking: Reported on 10/24/2023)         Review of Systems   Patient denies fever, chills, headache, lightheadedness, dizziness, rhinorrhea, cough, congestion, shortness of breath, chest pain, palpitations, abdominal pain, n/v, diarrhea, constipation, change in appetite, change in sleep pattern, dysuria, frequency, burning or pain with urination.  Other than stated in HPI all other review of systems is negative.       Physical Exam  Vital signs:BP (!) 154/70   Pulse 87   Temp 97.6  F (36.4  C)   Resp 18   Ht 1.575 m (5' 2\")   Wt 104.3 kg (230 lb)   SpO2 94%   BMI 42.07 kg/m     GENERAL APPEARANCE: Well developed, well nourished, in no acute distress.  HEENT: normocephalic, atraumatic   sclerae anicteric, conjunctivae clear and moist, EOM intact  LUNGS: Lung sounds CTA, no adventitious sounds, respiratory effort normal.  CARD: RRR, S1, S2, without murmurs, gallops, rubs  BACK: neck and shoulder muscle tension with trigger points  ABD: Soft, nondistended and nontender with normal bowel sounds.   MSK: Muscle strength and tone were equal bilaterally. Moves all extremities easily and intentionally.  LUE PICC CDI  EXTREMITIES: No cyanosis, clubbing or edema.  NEURO: Alert and oriented x 3.  Face is symmetric.  SKIN: " Inspection of the skin reveals no rashes, ulcerations or petechiae.  PSYCH: euthymic        Labs:    Last Comprehensive Metabolic Panel:  Lab Results   Component Value Date     10/19/2023    POTASSIUM 4.5 10/19/2023    CHLORIDE 101 10/19/2023    CO2 27 10/19/2023    ANIONGAP 12 10/19/2023     (H) 10/19/2023    BUN 29.8 (H) 10/19/2023    CR 0.89 10/19/2023    GFRESTIMATED 63 10/19/2023    ALVARO 9.5 10/19/2023       Lab Results   Component Value Date    WBC 5.0 10/19/2023     Lab Results   Component Value Date    RBC 3.97 10/19/2023     Lab Results   Component Value Date    HGB 12.0 10/19/2023     Lab Results   Component Value Date    HCT 38.8 10/19/2023     Lab Results   Component Value Date    MCV 98 10/19/2023     Lab Results   Component Value Date    MCH 30.2 10/19/2023     Lab Results   Component Value Date    MCHC 30.9 10/19/2023     Lab Results   Component Value Date    RDW 13.7 10/19/2023     Lab Results   Component Value Date     10/19/2023           Assessment/Plan:  Muscle tension pain  Discontinue robaxin, add icy hot TID with massage.      Septic arthritis of vertebra, due to unspecified organism (H24)  Continue with IV ancef through 10/31.  Pull PICC on 10/31 and discharge to home on 11/1.     Generalized muscle weakness  PT/OT, endurance improving    Chronic heart failure with preserved ejection fraction (H)  Weights stable, compensated, continue with losartan, spironolactone and bumex.  Monitor labs as needed.     Primary hypertension  Controlled for age, continue with losartan.      Rheumatoid arthritis, involving unspecified site, unspecified whether rheumatoid factor present (H)  No complaints of pain.  Continue on Apap TID.  Has been on methotrexate in the past.  Follow up with rheumatology regarding restarting.          Electronically signed by:Anna Mckeon NP              Sincerely,        Anna Mckeon NP

## 2023-10-25 ENCOUNTER — LAB REQUISITION (OUTPATIENT)
Dept: LAB | Facility: CLINIC | Age: 83
End: 2023-10-25
Payer: COMMERCIAL

## 2023-10-25 DIAGNOSIS — R78.81 BACTEREMIA: ICD-10-CM

## 2023-10-25 LAB — BACTERIA SNV CULT: NO GROWTH

## 2023-10-26 LAB
ANION GAP SERPL CALCULATED.3IONS-SCNC: 10 MMOL/L (ref 7–15)
BUN SERPL-MCNC: 29.9 MG/DL (ref 8–23)
CALCIUM SERPL-MCNC: 9.2 MG/DL (ref 8.8–10.2)
CHLORIDE SERPL-SCNC: 102 MMOL/L (ref 98–107)
CREAT SERPL-MCNC: 0.84 MG/DL (ref 0.51–0.95)
CRP SERPL-MCNC: 3.74 MG/L
DEPRECATED HCO3 PLAS-SCNC: 28 MMOL/L (ref 22–29)
EGFRCR SERPLBLD CKD-EPI 2021: 68 ML/MIN/1.73M2
ERYTHROCYTE [DISTWIDTH] IN BLOOD BY AUTOMATED COUNT: 13.8 % (ref 10–15)
GLUCOSE SERPL-MCNC: 93 MG/DL (ref 70–99)
HCT VFR BLD AUTO: 35 % (ref 35–47)
HGB BLD-MCNC: 10.8 G/DL (ref 11.7–15.7)
MCH RBC QN AUTO: 30.2 PG (ref 26.5–33)
MCHC RBC AUTO-ENTMCNC: 30.9 G/DL (ref 31.5–36.5)
MCV RBC AUTO: 98 FL (ref 78–100)
PLATELET # BLD AUTO: 207 10E3/UL (ref 150–450)
POTASSIUM SERPL-SCNC: 4.3 MMOL/L (ref 3.4–5.3)
RBC # BLD AUTO: 3.58 10E6/UL (ref 3.8–5.2)
SODIUM SERPL-SCNC: 140 MMOL/L (ref 135–145)
WBC # BLD AUTO: 4.9 10E3/UL (ref 4–11)

## 2023-10-26 PROCEDURE — 80048 BASIC METABOLIC PNL TOTAL CA: CPT | Performed by: FAMILY MEDICINE

## 2023-10-26 PROCEDURE — 86140 C-REACTIVE PROTEIN: CPT | Performed by: FAMILY MEDICINE

## 2023-10-26 PROCEDURE — 36415 COLL VENOUS BLD VENIPUNCTURE: CPT | Performed by: FAMILY MEDICINE

## 2023-10-26 PROCEDURE — P9604 ONE-WAY ALLOW PRORATED TRIP: HCPCS | Performed by: FAMILY MEDICINE

## 2023-10-26 PROCEDURE — 85027 COMPLETE CBC AUTOMATED: CPT | Performed by: FAMILY MEDICINE

## 2023-11-10 ENCOUNTER — LAB REQUISITION (OUTPATIENT)
Dept: LAB | Facility: CLINIC | Age: 83
End: 2023-11-10

## 2023-11-10 ENCOUNTER — TRANSFERRED RECORDS (OUTPATIENT)
Dept: HEALTH INFORMATION MANAGEMENT | Facility: CLINIC | Age: 83
End: 2023-11-10
Payer: COMMERCIAL

## 2023-11-10 DIAGNOSIS — I50.42 CHRONIC COMBINED SYSTOLIC (CONGESTIVE) AND DIASTOLIC (CONGESTIVE) HEART FAILURE (H): ICD-10-CM

## 2023-11-10 DIAGNOSIS — E03.9 HYPOTHYROIDISM, UNSPECIFIED: ICD-10-CM

## 2023-11-10 LAB — HGB BLD-MCNC: 12.3 G/DL (ref 11.7–15.7)

## 2023-11-10 PROCEDURE — 80048 BASIC METABOLIC PNL TOTAL CA: CPT | Performed by: FAMILY MEDICINE

## 2023-11-10 PROCEDURE — 85018 HEMOGLOBIN: CPT | Performed by: FAMILY MEDICINE

## 2023-11-10 PROCEDURE — 84443 ASSAY THYROID STIM HORMONE: CPT | Performed by: FAMILY MEDICINE

## 2023-11-11 LAB
ANION GAP SERPL CALCULATED.3IONS-SCNC: 14 MMOL/L (ref 7–15)
BUN SERPL-MCNC: 24.4 MG/DL (ref 8–23)
CALCIUM SERPL-MCNC: 9.7 MG/DL (ref 8.8–10.2)
CHLORIDE SERPL-SCNC: 103 MMOL/L (ref 98–107)
CREAT SERPL-MCNC: 0.9 MG/DL (ref 0.51–0.95)
DEPRECATED HCO3 PLAS-SCNC: 25 MMOL/L (ref 22–29)
EGFRCR SERPLBLD CKD-EPI 2021: 62 ML/MIN/1.73M2
GLUCOSE SERPL-MCNC: 90 MG/DL (ref 70–99)
POTASSIUM SERPL-SCNC: 4.4 MMOL/L (ref 3.4–5.3)
SODIUM SERPL-SCNC: 142 MMOL/L (ref 135–145)
TSH SERPL DL<=0.005 MIU/L-ACNC: 9.95 UIU/ML (ref 0.3–4.2)

## 2024-04-08 ENCOUNTER — LAB REQUISITION (OUTPATIENT)
Dept: LAB | Facility: CLINIC | Age: 84
End: 2024-04-08

## 2024-04-08 DIAGNOSIS — E78.5 HYPERLIPIDEMIA, UNSPECIFIED: ICD-10-CM

## 2024-04-08 DIAGNOSIS — E03.9 HYPOTHYROIDISM, UNSPECIFIED: ICD-10-CM

## 2024-04-08 DIAGNOSIS — I10 ESSENTIAL (PRIMARY) HYPERTENSION: ICD-10-CM

## 2024-04-08 PROCEDURE — 80061 LIPID PANEL: CPT | Performed by: FAMILY MEDICINE

## 2024-04-08 PROCEDURE — 84443 ASSAY THYROID STIM HORMONE: CPT | Performed by: FAMILY MEDICINE

## 2024-04-08 PROCEDURE — 80048 BASIC METABOLIC PNL TOTAL CA: CPT | Performed by: FAMILY MEDICINE

## 2024-04-09 LAB
ANION GAP SERPL CALCULATED.3IONS-SCNC: 13 MMOL/L (ref 7–15)
BUN SERPL-MCNC: 23.3 MG/DL (ref 8–23)
CALCIUM SERPL-MCNC: 9.4 MG/DL (ref 8.8–10.2)
CHLORIDE SERPL-SCNC: 106 MMOL/L (ref 98–107)
CHOLEST SERPL-MCNC: 189 MG/DL
CREAT SERPL-MCNC: 0.86 MG/DL (ref 0.51–0.95)
DEPRECATED HCO3 PLAS-SCNC: 24 MMOL/L (ref 22–29)
EGFRCR SERPLBLD CKD-EPI 2021: 66 ML/MIN/1.73M2
FASTING STATUS PATIENT QL REPORTED: NORMAL
GLUCOSE SERPL-MCNC: 90 MG/DL (ref 70–99)
HDLC SERPL-MCNC: 90 MG/DL
LDLC SERPL CALC-MCNC: 82 MG/DL
NONHDLC SERPL-MCNC: 99 MG/DL
POTASSIUM SERPL-SCNC: 4.5 MMOL/L (ref 3.4–5.3)
SODIUM SERPL-SCNC: 143 MMOL/L (ref 135–145)
TRIGL SERPL-MCNC: 83 MG/DL
TSH SERPL DL<=0.005 MIU/L-ACNC: 3.02 UIU/ML (ref 0.3–4.2)

## 2024-10-04 ENCOUNTER — LAB REQUISITION (OUTPATIENT)
Dept: LAB | Facility: CLINIC | Age: 84
End: 2024-10-04

## 2024-10-04 DIAGNOSIS — I10 ESSENTIAL (PRIMARY) HYPERTENSION: ICD-10-CM

## 2024-10-04 PROCEDURE — 80048 BASIC METABOLIC PNL TOTAL CA: CPT | Performed by: FAMILY MEDICINE

## 2024-10-05 LAB
ANION GAP SERPL CALCULATED.3IONS-SCNC: 10 MMOL/L (ref 7–15)
BUN SERPL-MCNC: 15 MG/DL (ref 8–23)
CALCIUM SERPL-MCNC: 8.8 MG/DL (ref 8.8–10.4)
CHLORIDE SERPL-SCNC: 107 MMOL/L (ref 98–107)
CREAT SERPL-MCNC: 0.8 MG/DL (ref 0.51–0.95)
EGFRCR SERPLBLD CKD-EPI 2021: 71 ML/MIN/1.73M2
GLUCOSE SERPL-MCNC: 98 MG/DL (ref 70–99)
HCO3 SERPL-SCNC: 26 MMOL/L (ref 22–29)
POTASSIUM SERPL-SCNC: 4.4 MMOL/L (ref 3.4–5.3)
SODIUM SERPL-SCNC: 143 MMOL/L (ref 135–145)

## 2025-04-04 ENCOUNTER — LAB REQUISITION (OUTPATIENT)
Dept: LAB | Facility: CLINIC | Age: 85
End: 2025-04-04

## 2025-04-04 DIAGNOSIS — E78.5 HYPERLIPIDEMIA, UNSPECIFIED: ICD-10-CM

## 2025-04-04 DIAGNOSIS — E03.9 HYPOTHYROIDISM, UNSPECIFIED: ICD-10-CM

## 2025-04-04 DIAGNOSIS — R60.0 LOCALIZED EDEMA: ICD-10-CM

## 2025-04-04 PROCEDURE — 80051 ELECTROLYTE PANEL: CPT | Performed by: FAMILY MEDICINE

## 2025-04-04 PROCEDURE — 82465 ASSAY BLD/SERUM CHOLESTEROL: CPT | Performed by: FAMILY MEDICINE

## 2025-04-04 PROCEDURE — 80048 BASIC METABOLIC PNL TOTAL CA: CPT | Performed by: FAMILY MEDICINE

## 2025-04-04 PROCEDURE — 84443 ASSAY THYROID STIM HORMONE: CPT | Performed by: FAMILY MEDICINE

## 2025-04-05 LAB
ANION GAP SERPL CALCULATED.3IONS-SCNC: 10 MMOL/L (ref 7–15)
BUN SERPL-MCNC: 20.4 MG/DL (ref 8–23)
CALCIUM SERPL-MCNC: 9.2 MG/DL (ref 8.8–10.4)
CHLORIDE SERPL-SCNC: 105 MMOL/L (ref 98–107)
CHOLEST SERPL-MCNC: 204 MG/DL
CREAT SERPL-MCNC: 0.79 MG/DL (ref 0.51–0.95)
EGFRCR SERPLBLD CKD-EPI 2021: 72 ML/MIN/1.73M2
FASTING STATUS PATIENT QL REPORTED: ABNORMAL
FASTING STATUS PATIENT QL REPORTED: ABNORMAL
GLUCOSE SERPL-MCNC: 102 MG/DL (ref 70–99)
HCO3 SERPL-SCNC: 28 MMOL/L (ref 22–29)
HDLC SERPL-MCNC: 91 MG/DL
LDLC SERPL CALC-MCNC: 93 MG/DL
NONHDLC SERPL-MCNC: 113 MG/DL
POTASSIUM SERPL-SCNC: 4.6 MMOL/L (ref 3.4–5.3)
SODIUM SERPL-SCNC: 143 MMOL/L (ref 135–145)
TRIGL SERPL-MCNC: 99 MG/DL
TSH SERPL DL<=0.005 MIU/L-ACNC: 3.1 UIU/ML (ref 0.3–4.2)